# Patient Record
Sex: FEMALE | Race: WHITE | Employment: OTHER | URBAN - NONMETROPOLITAN AREA
[De-identification: names, ages, dates, MRNs, and addresses within clinical notes are randomized per-mention and may not be internally consistent; named-entity substitution may affect disease eponyms.]

---

## 2017-09-22 ENCOUNTER — APPOINTMENT (OUTPATIENT)
Dept: CT IMAGING | Age: 77
DRG: 177 | End: 2017-09-22
Payer: MEDICARE

## 2017-09-22 ENCOUNTER — NURSE TRIAGE (OUTPATIENT)
Dept: ADMINISTRATIVE | Age: 77
End: 2017-09-22

## 2017-09-22 ENCOUNTER — APPOINTMENT (OUTPATIENT)
Dept: GENERAL RADIOLOGY | Age: 77
DRG: 177 | End: 2017-09-22
Payer: MEDICARE

## 2017-09-22 ENCOUNTER — HOSPITAL ENCOUNTER (INPATIENT)
Age: 77
LOS: 12 days | Discharge: NURSING, REHAB OR LONG-TERM CARE | DRG: 177 | End: 2017-10-04
Attending: FAMILY MEDICINE | Admitting: INTERNAL MEDICINE
Payer: MEDICARE

## 2017-09-22 DIAGNOSIS — E86.0 DEHYDRATION: ICD-10-CM

## 2017-09-22 DIAGNOSIS — R33.9 URINARY RETENTION: ICD-10-CM

## 2017-09-22 DIAGNOSIS — R50.9 FEVER, UNSPECIFIED FEVER CAUSE: Primary | ICD-10-CM

## 2017-09-22 DIAGNOSIS — K20.90 ESOPHAGITIS, UNSPECIFIED: ICD-10-CM

## 2017-09-22 PROBLEM — G30.9 ALZHEIMER'S DISEASE (HCC): Status: ACTIVE | Noted: 2017-09-22

## 2017-09-22 PROBLEM — F02.80 ALZHEIMER'S DISEASE (HCC): Status: ACTIVE | Noted: 2017-09-22

## 2017-09-22 PROBLEM — M48.061 SPINAL STENOSIS AT L4-L5 LEVEL: Status: ACTIVE | Noted: 2017-09-22

## 2017-09-22 PROBLEM — R07.9 CHEST PAIN: Status: ACTIVE | Noted: 2017-09-22

## 2017-09-22 PROBLEM — K44.0 HIATUS HERNIA WITH OBSTRUCTION: Status: ACTIVE | Noted: 2017-09-22

## 2017-09-22 PROBLEM — J69.0 ASPIRATION PNEUMONIA (HCC): Status: ACTIVE | Noted: 2017-09-22

## 2017-09-22 PROBLEM — R29.898 WEAKNESS OF LEFT LOWER EXTREMITY: Status: ACTIVE | Noted: 2017-09-22

## 2017-09-22 LAB
ALBUMIN SERPL-MCNC: 3.5 G/DL (ref 3.5–5.1)
ALLEN TEST: POSITIVE
ALP BLD-CCNC: 108 U/L (ref 38–126)
ALT SERPL-CCNC: 19 U/L (ref 11–66)
ANION GAP SERPL CALCULATED.3IONS-SCNC: 16 MEQ/L (ref 8–16)
ANISOCYTOSIS: ABNORMAL
APTT: 23.6 SECONDS (ref 22–38)
AST SERPL-CCNC: 25 U/L (ref 5–40)
BACTERIA: ABNORMAL
BASE EXCESS (CALCULATED): 4 MMOL/L (ref -2.5–2.5)
BASOPHILS # BLD: 0.1 %
BASOPHILS ABSOLUTE: 0 THOU/MM3 (ref 0–0.1)
BILIRUB SERPL-MCNC: 0.6 MG/DL (ref 0.3–1.2)
BILIRUBIN DIRECT: < 0.2 MG/DL (ref 0–0.3)
BILIRUBIN URINE: NEGATIVE
BLOOD, URINE: NEGATIVE
BUN BLDV-MCNC: 35 MG/DL (ref 7–22)
CALCIUM SERPL-MCNC: 9.1 MG/DL (ref 8.5–10.5)
CASTS: ABNORMAL /LPF
CASTS: ABNORMAL /LPF
CHARACTER, URINE: ABNORMAL
CHLORIDE BLD-SCNC: 97 MEQ/L (ref 98–111)
CO2: 24 MEQ/L (ref 23–33)
COLLECTED BY:: ABNORMAL
COLOR: YELLOW
CREAT SERPL-MCNC: 0.9 MG/DL (ref 0.4–1.2)
CRYSTALS: ABNORMAL
DEVICE: ABNORMAL
EKG ATRIAL RATE: 86 BPM
EKG P AXIS: 36 DEGREES
EKG P-R INTERVAL: 126 MS
EKG Q-T INTERVAL: 372 MS
EKG QRS DURATION: 80 MS
EKG QTC CALCULATION (BAZETT): 445 MS
EKG R AXIS: 12 DEGREES
EKG T AXIS: 64 DEGREES
EKG VENTRICULAR RATE: 86 BPM
EOSINOPHIL # BLD: 0 %
EOSINOPHILS ABSOLUTE: 0 THOU/MM3 (ref 0–0.4)
EPITHELIAL CELLS, UA: ABNORMAL /HPF
GFR SERPL CREATININE-BSD FRML MDRD: 61 ML/MIN/1.73M2
GLUCOSE BLD-MCNC: 107 MG/DL (ref 70–108)
GLUCOSE, URINE: NEGATIVE MG/DL
HCO3: 28 MMOL/L (ref 23–28)
HCT VFR BLD CALC: 40.1 % (ref 37–47)
HEMOGLOBIN: 13.7 GM/DL (ref 12–16)
INR BLD: 1.02 (ref 0.85–1.13)
KETONES, URINE: NEGATIVE
LACTIC ACID: 1 MMOL/L (ref 0.5–2.2)
LEUKOCYTE ESTERASE, URINE: NEGATIVE
LIPASE: 55.9 U/L (ref 5.6–51.3)
LYMPHOCYTES # BLD: 9.8 %
LYMPHOCYTES ABSOLUTE: 1.5 THOU/MM3 (ref 1–4.8)
MCH RBC QN AUTO: 28.7 PG (ref 27–31)
MCHC RBC AUTO-ENTMCNC: 34.2 GM/DL (ref 33–37)
MCV RBC AUTO: 83.9 FL (ref 81–99)
MISCELLANEOUS LAB TEST RESULT: ABNORMAL
MONOCYTES # BLD: 6.7 %
MONOCYTES ABSOLUTE: 1 THOU/MM3 (ref 0.4–1.3)
NITRITE, URINE: NEGATIVE
NUCLEATED RED BLOOD CELLS: 0 /100 WBC
O2 SATURATION: 94 %
OSMOLALITY CALCULATION: 282.3 MOSMOL/KG (ref 275–300)
PCO2: 38 MMHG (ref 35–45)
PDW BLD-RTO: 16.9 % (ref 11.5–14.5)
PH BLOOD GAS: 7.47 (ref 7.35–7.45)
PH UA: 5
PLATELET # BLD: 361 THOU/MM3 (ref 130–400)
PMV BLD AUTO: 7.4 MCM (ref 7.4–10.4)
PO2: 66 MMHG (ref 71–104)
POTASSIUM SERPL-SCNC: 3.7 MEQ/L (ref 3.5–5.2)
PRO-BNP: 1048 PG/ML (ref 0–1800)
PROCALCITONIN: 0.07 NG/ML (ref 0.01–0.09)
PROTEIN UA: ABNORMAL MG/DL
RBC # BLD: 4.78 MILL/MM3 (ref 4.2–5.4)
RBC # BLD: NORMAL 10*6/UL
RBC URINE: ABNORMAL /HPF
RENAL EPITHELIAL, UA: ABNORMAL
SEG NEUTROPHILS: 83.4 %
SEGMENTED NEUTROPHILS ABSOLUTE COUNT: 12.5 THOU/MM3 (ref 1.8–7.7)
SODIUM BLD-SCNC: 137 MEQ/L (ref 135–145)
SOURCE, BLOOD GAS: ABNORMAL
SPECIFIC GRAVITY UA: 1.03 (ref 1–1.03)
TOTAL PROTEIN: 6.7 G/DL (ref 6.1–8)
TROPONIN T: < 0.01 NG/ML
UROBILINOGEN, URINE: 0.2 EU/DL
WBC # BLD: 15 THOU/MM3 (ref 4.8–10.8)
WBC UA: ABNORMAL /HPF
YEAST: ABNORMAL

## 2017-09-22 PROCEDURE — 87040 BLOOD CULTURE FOR BACTERIA: CPT

## 2017-09-22 PROCEDURE — 99285 EMERGENCY DEPT VISIT HI MDM: CPT

## 2017-09-22 PROCEDURE — 6360000002 HC RX W HCPCS: Performed by: EMERGENCY MEDICINE

## 2017-09-22 PROCEDURE — 6370000000 HC RX 637 (ALT 250 FOR IP): Performed by: INTERNAL MEDICINE

## 2017-09-22 PROCEDURE — 51798 US URINE CAPACITY MEASURE: CPT

## 2017-09-22 PROCEDURE — 87086 URINE CULTURE/COLONY COUNT: CPT

## 2017-09-22 PROCEDURE — B32T1ZZ COMPUTERIZED TOMOGRAPHY (CT SCAN) OF LEFT PULMONARY ARTERY USING LOW OSMOLAR CONTRAST: ICD-10-PCS | Performed by: RADIOLOGY

## 2017-09-22 PROCEDURE — 6360000004 HC RX CONTRAST MEDICATION: Performed by: EMERGENCY MEDICINE

## 2017-09-22 PROCEDURE — 96361 HYDRATE IV INFUSION ADD-ON: CPT

## 2017-09-22 PROCEDURE — 96374 THER/PROPH/DIAG INJ IV PUSH: CPT

## 2017-09-22 PROCEDURE — 82803 BLOOD GASES ANY COMBINATION: CPT

## 2017-09-22 PROCEDURE — 6360000002 HC RX W HCPCS: Performed by: FAMILY MEDICINE

## 2017-09-22 PROCEDURE — 83880 ASSAY OF NATRIURETIC PEPTIDE: CPT

## 2017-09-22 PROCEDURE — 36415 COLL VENOUS BLD VENIPUNCTURE: CPT

## 2017-09-22 PROCEDURE — 82248 BILIRUBIN DIRECT: CPT

## 2017-09-22 PROCEDURE — 96375 TX/PRO/DX INJ NEW DRUG ADDON: CPT

## 2017-09-22 PROCEDURE — 85730 THROMBOPLASTIN TIME PARTIAL: CPT

## 2017-09-22 PROCEDURE — 83605 ASSAY OF LACTIC ACID: CPT

## 2017-09-22 PROCEDURE — 81001 URINALYSIS AUTO W/SCOPE: CPT

## 2017-09-22 PROCEDURE — 74177 CT ABD & PELVIS W/CONTRAST: CPT

## 2017-09-22 PROCEDURE — 84484 ASSAY OF TROPONIN QUANT: CPT

## 2017-09-22 PROCEDURE — 85610 PROTHROMBIN TIME: CPT

## 2017-09-22 PROCEDURE — 76376 3D RENDER W/INTRP POSTPROCES: CPT

## 2017-09-22 PROCEDURE — 36600 WITHDRAWAL OF ARTERIAL BLOOD: CPT

## 2017-09-22 PROCEDURE — 1200000000 HC SEMI PRIVATE

## 2017-09-22 PROCEDURE — 96376 TX/PRO/DX INJ SAME DRUG ADON: CPT

## 2017-09-22 PROCEDURE — 2580000003 HC RX 258: Performed by: FAMILY MEDICINE

## 2017-09-22 PROCEDURE — B32S1ZZ COMPUTERIZED TOMOGRAPHY (CT SCAN) OF RIGHT PULMONARY ARTERY USING LOW OSMOLAR CONTRAST: ICD-10-PCS | Performed by: RADIOLOGY

## 2017-09-22 PROCEDURE — 83690 ASSAY OF LIPASE: CPT

## 2017-09-22 PROCEDURE — 84145 PROCALCITONIN (PCT): CPT

## 2017-09-22 PROCEDURE — 82550 ASSAY OF CK (CPK): CPT

## 2017-09-22 PROCEDURE — 51701 INSERT BLADDER CATHETER: CPT

## 2017-09-22 PROCEDURE — 93005 ELECTROCARDIOGRAM TRACING: CPT | Performed by: FAMILY MEDICINE

## 2017-09-22 PROCEDURE — 80053 COMPREHEN METABOLIC PANEL: CPT

## 2017-09-22 PROCEDURE — 85025 COMPLETE CBC W/AUTO DIFF WBC: CPT

## 2017-09-22 PROCEDURE — 71275 CT ANGIOGRAPHY CHEST: CPT

## 2017-09-22 PROCEDURE — 71010 XR CHEST PORTABLE: CPT

## 2017-09-22 PROCEDURE — 99223 1ST HOSP IP/OBS HIGH 75: CPT | Performed by: INTERNAL MEDICINE

## 2017-09-22 RX ORDER — ARIPIPRAZOLE 2 MG/1
2 TABLET ORAL DAILY
Status: DISCONTINUED | OUTPATIENT
Start: 2017-09-23 | End: 2017-09-23

## 2017-09-22 RX ORDER — ASPIRIN 81 MG/1
81 TABLET, CHEWABLE ORAL NIGHTLY
Status: DISCONTINUED | OUTPATIENT
Start: 2017-09-22 | End: 2017-10-01

## 2017-09-22 RX ORDER — ACETAMINOPHEN 650 MG/1
650 SUPPOSITORY RECTAL EVERY 8 HOURS PRN
Status: DISCONTINUED | OUTPATIENT
Start: 2017-09-22 | End: 2017-10-04 | Stop reason: HOSPADM

## 2017-09-22 RX ORDER — LOPERAMIDE HYDROCHLORIDE 2 MG/1
2 CAPSULE ORAL NIGHTLY
Status: DISCONTINUED | OUTPATIENT
Start: 2017-09-22 | End: 2017-10-02

## 2017-09-22 RX ORDER — DICYCLOMINE HCL 20 MG
20 TABLET ORAL 2 TIMES DAILY
Status: DISCONTINUED | OUTPATIENT
Start: 2017-09-22 | End: 2017-09-23

## 2017-09-22 RX ORDER — SODIUM CHLORIDE 9 MG/ML
INJECTION, SOLUTION INTRAVENOUS CONTINUOUS
Status: DISCONTINUED | OUTPATIENT
Start: 2017-09-22 | End: 2017-09-22 | Stop reason: HOSPADM

## 2017-09-22 RX ORDER — ONDANSETRON 2 MG/ML
4 INJECTION INTRAMUSCULAR; INTRAVENOUS ONCE
Status: COMPLETED | OUTPATIENT
Start: 2017-09-22 | End: 2017-09-22

## 2017-09-22 RX ORDER — SIMVASTATIN 40 MG
40 TABLET ORAL NIGHTLY
Status: DISCONTINUED | OUTPATIENT
Start: 2017-09-22 | End: 2017-09-23

## 2017-09-22 RX ORDER — DEXTROSE AND SODIUM CHLORIDE 5; .45 G/100ML; G/100ML
INJECTION, SOLUTION INTRAVENOUS CONTINUOUS
Status: DISCONTINUED | OUTPATIENT
Start: 2017-09-22 | End: 2017-09-25

## 2017-09-22 RX ORDER — PANTOPRAZOLE SODIUM 40 MG/1
40 TABLET, DELAYED RELEASE ORAL
Status: DISCONTINUED | OUTPATIENT
Start: 2017-09-23 | End: 2017-09-30

## 2017-09-22 RX ORDER — DRONABINOL 2.5 MG/1
2.5 CAPSULE ORAL 2 TIMES DAILY
Status: DISCONTINUED | OUTPATIENT
Start: 2017-09-22 | End: 2017-09-29

## 2017-09-22 RX ORDER — 0.9 % SODIUM CHLORIDE 0.9 %
500 INTRAVENOUS SOLUTION INTRAVENOUS ONCE
Status: COMPLETED | OUTPATIENT
Start: 2017-09-22 | End: 2017-09-22

## 2017-09-22 RX ORDER — VENLAFAXINE HYDROCHLORIDE 75 MG/1
75 CAPSULE, EXTENDED RELEASE ORAL 2 TIMES DAILY
Status: DISCONTINUED | OUTPATIENT
Start: 2017-09-22 | End: 2017-10-03

## 2017-09-22 RX ORDER — DONEPEZIL HYDROCHLORIDE 10 MG/1
10 TABLET, FILM COATED ORAL DAILY
Status: DISCONTINUED | OUTPATIENT
Start: 2017-09-23 | End: 2017-09-23

## 2017-09-22 RX ORDER — OMEPRAZOLE 20 MG/1
20 CAPSULE, DELAYED RELEASE ORAL DAILY
Status: DISCONTINUED | OUTPATIENT
Start: 2017-09-23 | End: 2017-09-22 | Stop reason: CLARIF

## 2017-09-22 RX ORDER — LEVOFLOXACIN 5 MG/ML
750 INJECTION, SOLUTION INTRAVENOUS ONCE
Status: COMPLETED | OUTPATIENT
Start: 2017-09-22 | End: 2017-09-22

## 2017-09-22 RX ADMIN — ONDANSETRON 4 MG: 2 INJECTION INTRAMUSCULAR; INTRAVENOUS at 12:19

## 2017-09-22 RX ADMIN — ACETAMINOPHEN 650 MG: 650 SUPPOSITORY RECTAL at 21:07

## 2017-09-22 RX ADMIN — LEVOFLOXACIN 750 MG: 5 INJECTION, SOLUTION INTRAVENOUS at 20:39

## 2017-09-22 RX ADMIN — IOPAMIDOL 80 ML: 755 INJECTION, SOLUTION INTRAVENOUS at 16:03

## 2017-09-22 RX ADMIN — SODIUM CHLORIDE 500 ML: 9 INJECTION, SOLUTION INTRAVENOUS at 15:10

## 2017-09-22 RX ADMIN — ONDANSETRON 4 MG: 2 INJECTION INTRAMUSCULAR; INTRAVENOUS at 18:35

## 2017-09-22 RX ADMIN — SODIUM CHLORIDE: 9 INJECTION, SOLUTION INTRAVENOUS at 16:22

## 2017-09-22 ASSESSMENT — ENCOUNTER SYMPTOMS
BACK PAIN: 0
DIARRHEA: 0
ABDOMINAL PAIN: 0
SHORTNESS OF BREATH: 1

## 2017-09-22 ASSESSMENT — PAIN SCALES - GENERAL: PAINLEVEL_OUTOF10: 0

## 2017-09-22 NOTE — ED PROVIDER NOTES
[facility]  eMERGENCY dEPARTMENT eNCOUnter          [address] [facility]       Chief Complaint   Patient presents with    Shortness of Breath     last night    Other     gagging on her medications and spiting them out       Nurses Notes reviewed and I agree except as noted in the HPI. HISTORY OF PRESENT ILLNESS    Nate Garcia is a 65 y.o. female with a past medical history of Dementia, GERD, Hyperlipidemia, and Hypertension who presents to the ED vis EMS for evaluation of shortness of breath. Per family, the patient has been complaining of feeling short of breath at night, particularly when laying flat. Family has noticed that when the patient is laying down to sleep she seems to be working harder to breath. These symptoms improve when the patient is placed into an upright seated position. She does not have any known lung diseases or history of CHF. The patient denies feeling short of breath at this time, but does report having occasional chest pain. Family also states that the patient had nausea and emesis two days ago, which has since resolved however the patient had been gagging more often when taking her medications. The patient does not use any chronic home O2 support. She is not a smoker. No additional complaints or concerns at the time of initial evaluation. Location/Symptom: shortness of breath   Timing/Onset: last night   Context/Setting: when laying flat; improved with sitting up   Quality: cant take full breath   Duration: occasional- only at night   Modifying Factors: worse when laying flat  Severity: mild      The HPI was provided by the patient. REVIEW OF SYSTEMS     Review of Systems   Constitutional: Positive for fever. HENT: Positive for congestion. Eyes: Negative for visual disturbance. Respiratory: Positive for shortness of breath. Cardiovascular: Negative for chest pain, palpitations and leg swelling.    Gastrointestinal: Negative for abdominal pain and diarrhea.        2 days ago had nausea and vomiting which resolved   Genitourinary: Negative for dysuria. Musculoskeletal: Negative for back pain and joint swelling. Skin: Negative for rash. Neurological: Negative for headaches. Hematological: Does not bruise/bleed easily. Psychiatric/Behavioral:        Chronic dementia       PAST MEDICAL HISTORY    has a past medical history of Dementia; Depression; GERD (gastroesophageal reflux disease); Hyperlipidemia; and Hypertension. SURGICAL HISTORY      has a past surgical history that includes Colonoscopy; Cholecystectomy; and Upper gastrointestinal endoscopy. CURRENT MEDICATIONS       Previous Medications    ARIPIPRAZOLE (ABILIFY) 2 MG TABLET    Take 2 mg by mouth daily    ASPIRIN 81 MG TABLET    Take 81 mg by mouth nightly. DICYCLOMINE (BENTYL) 20 MG TABLET    Take 20 mg by mouth 2 times daily    DONEPEZIL (ARICEPT) 10 MG TABLET    Take 10 mg by mouth daily     LISINOPRIL PO    Take 60 mg by mouth daily    LOPERAMIDE (IMODIUM) 2 MG CAPSULE    Take 2 mg by mouth nightly. OMEPRAZOLE (PRILOSEC) 10 MG CAPSULE    Take 20 mg by mouth daily     SIMVASTATIN (ZOCOR) 40 MG TABLET    Take 40 mg by mouth nightly. VENLAFAXINE (EFFEXOR-XR) 75 MG XR CAPSULE    Take 75 mg by mouth 2 times daily. ALLERGIES     has No Known Allergies. FAMILY HISTORY     indicated that the status of her mother is unknown. She indicated that the status of her father is unknown. She indicated that the status of her sister is unknown. She indicated that the status of her brother is unknown.  family history includes Hearing Loss in her brother, father, mother, and sister; High Blood Pressure in her mother; Kidney Disease in her sister; Stroke in her brother and father; Vision Loss in her mother. SOCIAL HISTORY      reports that she has never smoked. She has never used smokeless tobacco. She reports that she does not drink alcohol or use illicit drugs.     PHYSICAL EXAM INITIAL VITALS:  oral temperature is 99.1 °F (37.3 °C). Her blood pressure is 152/85 (abnormal) and her pulse is 81. Her respiration is 18 and oxygen saturation is 92%. Physical Exam   Constitutional: She appears well-developed and well-nourished. GCS 15   HENT:   Head: Normocephalic and atraumatic. Eyes: Conjunctivae are normal. Pupils are equal, round, and reactive to light. Neck: Normal range of motion. Neck supple. No JVD present. Cardiovascular: Normal rate and regular rhythm. Pulmonary/Chest: Effort normal and breath sounds normal. She has no wheezes. O2 sats to drop to around 90% on room air   Abdominal: Soft. Bowel sounds are normal. There is no tenderness. There is no rebound and no guarding. Musculoskeletal: Normal range of motion. She exhibits no edema. Neurological: She is alert. She exhibits normal muscle tone. Skin: Skin is warm and dry. No rash noted. Psychiatric:   Poor memory but alert cooperative   Nursing note and vitals reviewed. DIFFERENTIAL DIAGNOSIS:   Shortness of breath, possible pneumonia    With hypoxemia also consider PE    She has dementia low-grade fever consider UTI        DIAGNOSTIC RESULTS     EKG: All EKG's are interpreted by the Emergency Department Physician who either signs or Co-signs this chart in the absence of a cardiologist.  EKG interpreted by Yasmani Saldivar MD:    EKG showed Sinus rhythm with rate 86. QRS complexes show  normal axis, Normal conduction. ST-T waves show Nonspecific changes        RADIOLOGY: non-plain film images(s) such as CT, Ultrasound and MRI are read by the radiologist.    XR Chest Portable   Final Result   Large fixed hiatal hernia. No definite acute findings. **This report has been created using voice recognition software. It may contain minor errors which are inherent in voice recognition technology. **      Final report electronically signed by Dr. Kassi Renee on 9/22/2017 12:53 PM      CTA CHEST W WO CONTRAST    (Results Pending)   CT ABDOMEN PELVIS W IV CONTRAST Additional Contrast? None    (Results Pending)        LABS:   Labs Reviewed   CBC WITH AUTO DIFFERENTIAL - Abnormal; Notable for the following:        Result Value    WBC 15.0 (*)     RDW 16.9 (*)     Segs Absolute 12.5 (*)     All other components within normal limits   BASIC METABOLIC PANEL - Abnormal; Notable for the following:     Chloride 97 (*)     BUN 35 (*)     All other components within normal limits   LIPASE - Abnormal; Notable for the following:     Lipase 55.9 (*)     All other components within normal limits   BLOOD GAS, ARTERIAL - Abnormal; Notable for the following:     pH, Blood Gas 7.47 (*)     PO2 66 (*)     Base Excess (Calculated) 4.0 (*)     All other components within normal limits   GLOMERULAR FILTRATION RATE, ESTIMATED - Abnormal; Notable for the following:     Est, Glom Filt Rate 61 (*)     All other components within normal limits   URINALYSIS WITH MICROSCOPIC - Abnormal; Notable for the following:     Protein, UA TRACE (*)     All other components within normal limits   URINE CULTURE   CULTURE BLOOD #1   CULTURE BLOOD #2   PROTIME-INR   BRAIN NATRIURETIC PEPTIDE   APTT   HEPATIC FUNCTION PANEL   TROPONIN   ANION GAP   OSMOLALITY   LACTIC ACID, PLASMA   PROCALCITONIN       EMERGENCY DEPARTMENT COURSE:   Vitals:    Vitals:    09/22/17 1143 09/22/17 1406 09/22/17 1511   BP: (!) 160/120 133/76 (!) 152/85   Pulse: 82 86 81   Resp: 20 18 18   Temp: 99.1 °F (37.3 °C)     TempSrc: Oral     SpO2: 94% 97% 92%       11:52 AM: The patient was seen and evaluated. Labs and imaging will be completed.       Nursing notes reviewed    She does have low-grade fever without definite chest x-ray explanation    Urinalysis obtained showed minimal white cells    Her O2 sats dropped to 90% on room air    Consider PE as a cause of her symptoms    We'll do a CT scan of the chest for PE/pneumonia    Will do a CT of the abdomen to evaluate for cause of

## 2017-09-22 NOTE — IP AVS SNAPSHOT
After Visit Summary  (Discharge Instructions)    Medication List for Home    Based on the information you provided to us as well as any changes during this visit, the following is your updated medication list.  Compare this with your prescription bottles at home. If you have any questions or concerns, contact your primary care physician's office. Daily Medication List (This medication list can be shared with any healthcare provider who is helping you manage your medications)      There are NEW medications for you.  START taking them after you leave the hospital        Last Dose    Next Dose Due AM NOON PM NIGHT    ferrous gluconate 240 (27 Fe) MG tablet   Commonly known as:  FERGON   Take 1.5 tablets by mouth 3 times daily (with meals)                120 mg on 10/4/2017  8:46 AM     10/04/17 PM                                risperiDONE 0.25 MG tablet   Commonly known as:  RISPERDAL   Take 1 tablet by mouth 2 times daily                0.25 mg on 10/3/2017  9:29 PM     10/04/17 PM                             sucralfate 1 GM/10ML suspension   Commonly known as:  CARAFATE   Take 10 mLs by mouth 4 times daily (before meals and nightly)                1 g on 10/4/2017 12:31 AM     10/04/17 PM                                       You told us you were taking these medications at home, but the amount or how often you take this medication has CHANGED        Last Dose    Next Dose Due AM NOON PM NIGHT    atenolol 50 MG tablet   Commonly known as:  TENORMIN   Take 1 tablet by mouth daily Hold for systolic blood pressure less than 110, heart rate less than 60   What changed:    - medication strength  - how much to take  - additional instructions                50 mg on 10/3/2017  9:05 AM     10/05/17 AM                          pantoprazole 40 MG tablet   Commonly known as:  PROTONIX   Take 1 tablet by mouth 2 times daily (before meals)   What changed:    - medication strength  - how much to take - when to take this                40 mg on 10/3/2017  4:48 PM     10/04/17 PM                             venlafaxine 75 MG extended release capsule   Commonly known as:  EFFEXOR XR   Take 1 capsule by mouth daily (with breakfast)   What changed:  when to take this                75 mg on 10/3/2017  8:59 AM     10/05/17 AM                            These are medications you told us you were taking at home, CONTINUE taking them after you leave the hospital        Last Dose    Next Dose Due AM NOON PM NIGHT    ALPRAZolam 0.5 MG tablet   Commonly known as:  XANAX   Take 1 tablet by mouth every 8 hours as needed for Anxiety                0.5 mg on 10/3/2017  8:59 AM                            atorvastatin 40 MG tablet   Commonly known as:  LIPITOR   Take 40 mg by mouth daily                  10/04/17 PM                          donepezil 10 MG tablet   Commonly known as:  ARICEPT   Take 20 mg by mouth daily                20 mg on 10/3/2017  9:02 AM     10/05/17 AM                          loperamide 2 MG capsule   Commonly known as:  IMODIUM   Take 2 mg by mouth nightly.                 2 mg on 9/30/2017  8:24 PM     10/04/17 PM                            These are the medications you have told us you were taking at home, STOP taking them after you leave the hospital     ARIPiprazole 2 MG tablet   Commonly known as:  ABILIFY       aspirin 81 MG tablet       dicyclomine 20 MG tablet   Commonly known as:  BENTYL       glycopyrrolate 1 MG tablet   Commonly known as:  ROBINUL       hydrALAZINE 50 MG tablet   Commonly known as:  APRESOLINE       LISINOPRIL PO       omeprazole 10 MG delayed release capsule   Commonly known as:  PRILOSEC       simvastatin 40 MG tablet   Commonly known as:  ZOCOR            Where to Get Your Medications      You can get these medications from any pharmacy     Bring a paper prescription for each of these medications     ALPRAZolam 0.5 MG tablet Information about where to get these medications is not yet available     ! Ask your nurse or doctor about these medications     atenolol 50 MG tablet    ferrous gluconate 240 (27 Fe) MG tablet    pantoprazole 40 MG tablet    risperiDONE 0.25 MG tablet    sucralfate 1 GM/10ML suspension    venlafaxine 75 MG extended release capsule               Allergies as of 10/4/2017     No Known Allergies      Immunizations as of 10/4/2017     No immunizations on file. Last Vitals          Most Recent Value    Temp  97.7 °F (36.5 °C)    Pulse  67    Resp  18    BP  125/62         After Visit Summary    This summary was created for you. Thank you for entrusting your care to us. The following information includes details about your hospital/visit stay along with steps you should take to help with your recovery once you leave the hospital.  In this packet, you will find information about the topics listed below:    · Instructions about your medications including a list of your home medications  · A summary of your hospital visit  · Follow-up appointments once you have left the hospital  · Your care plan at home      You may receive a survey regarding the care you received during your stay. Your input is valuable to us. We encourage you to complete and return your survey in the envelope provided. We hope you will choose us in the future for your healthcare needs. Patient Information     Patient Name MARILIA Boggs 1940      Care Provided at:     Name Address Phone       [address] [phone]            Your Visit    Here you will find information about your visit, including the reason for your visit. Please take this sheet with you when you visit your doctor or other health care provider in the future. It will help determine the best possible medical care for you at that time.   If you have any questions once you leave the hospital, please call the department phone number listed below. Why you were here     Your primary diagnosis was:  Aspiration Pneumonia (Hcc)    Your diagnoses also included:  Hiatus Hernia With Obstruction, Chest Pain, Spinal Stenosis At L4-L5 Level, Weakness Of Left Lower Extremity, Urinary Retention, Alzheimer's Dementia With Behavioral Disturbance, Fever, Dehydration, Esophagitis, Unspecified, Dysmotility Of Stomach      Visit Information     Date & Time Provider Department Dept. Phone    9/22/2017 Ernesto Starks MD [facility] [phone]       Follow-up Appointments    Below is a list of your follow-up and future appointments. This may not be a complete list as you may have made appointments directly with providers that we are not aware of or your providers may have made some for you. Please call your providers to confirm appointments. It is important to keep your appointments. Please bring your current insurance card, photo ID, co-pay, and all medication bottles to your appointment. If self-pay, payment is expected at the time of service. Follow-up Information     Follow up with Palak Kerns DO. Specialty:  Family Medicine    Contact information:    Pam Joseph [address] [place] [address]  [phone]          Follow up with [facility] / Kellie Pope. Specialty:  [address] information:    [address] [place] [address]  [phone]          Follow up with Eduin Monroe NP On 10/10/2017. Specialty:  Nurse Practitioner    Why:  930AM    Contact information:    [address]          Follow up with Justin Galvez MD In 3 weeks.     Specialty:  Neurology    Why:  Will call you to make appointment    Contact information:    [facility]          Follow up with Carlyle Hutchins MD.    Specialty:  Psychiatry Why:  Office will call with appointment    Contact information:    Albert Landis  [address] [place] [address] Road [address]        Future Appointments     10/10/2017 9:30 AM     Appointment with Wilmer Lowery NP at [facility] ([phone])   Please arrive 15 minutes prior to scheduled appointment time to complete paper work, bring photo ID and insurance cards. [address] [address]         Preventive Care        Date Due    Tetanus Combination Vaccine (1 - Tdap) 1958    Cholesterol Screening 1979    Zoster Vaccine 1999    Osteoporosis screening or a bone density scan (Dexa) is recommended once at age 72. Based upon the results and risk factors for bone loss, your provider will recommend whether this needs to be repeated. 2004    Pneumococcal Vaccines (two) for all adults aged 72 and over (1 of 2 - PCV13) 2004    Yearly Flu Vaccine (1) 2017                 Care Plan Once You Return Home    This section includes instructions you will need to follow once you leave the hospital.  Your care team will discuss these with you, so you and those caring for you know how to best care for your health needs at home. This section may also include educational information about certain health topics that may be of help to you. Discharge [address] Continuity of Care Form    Patient Name: Jerry Borja   :  1940  MRN:  [id number]    Admit date:  2017  Discharge date:  10-4-2017    Code Status Order: DNR-CCA   Advance Directives:  Yes    Admitting Physician:  Tremaine Munoz MD  PCP: Delma Lr DO    Discharging Nurse: [address] [facility] Unit/Room#: 8B-23/023-A  Discharging Unit Phone Number: [phone]    Emergency Contact:   Contact 1: Name: Josh Franco  Contact 1: Number: [phone] home [phone] cell  Contact 1: Relationship:     Past Surgical History:  Past Surgical History: Procedure Laterality Date    CHOLECYSTECTOMY      COLONOSCOPY      UPPER GASTROINTESTINAL ENDOSCOPY         Immunization History: There is no immunization history for the selected administration types on file for this patient. Active Problems:  Patient Active Problem List   Diagnosis    Dementia    Hiatus hernia with compression intrathoracic    Chest pain    Spinal stenosis at L4-L5 level  with left leg weakness and urinary retention    Weakness of left lower extremity  spinal stenosis  or cva    Urinary retention  neurogenic bladder    Alzheimer's dementia with behavioral disturbance    Aspiration pneumonia (HCC)  bilateral  from large hiatus hernia    Fever    Dehydration       Isolation/Infection:   Isolation     No Isolation            Nurse Assessment:  Last Vital Signs: BP (!) 170/76  Pulse 78  Temp 98.4 °F (36.9 °C) (Axillary)   Resp 16  Ht 5' 1\" (1.549 m)  Wt 199 lb 2 oz (90.3 kg)  SpO2 95%  BMI 37.62 kg/m2    Last documented pain score (0-10 scale): Pain Level: 0  Last Weight:   Wt Readings from Last 1 Encounters:   09/28/17 199 lb 2 oz (90.3 kg)     Mental Status:  disoriented     IV Access:  - None    Nursing Mobility/ADLs:  Walking   Dependent  Transfer  Dependent  Bathing  Dependent  Dressing  Dependent  Toileting  Dependent  Feeding  Assisted  Med Admin  Dependent  Med Delivery   whole    Wound Care Documentation and Therapy:        Elimination:  Continence:   · Bowel: Yes  · Bladder: [place]  Urinary Catheter: Insertion Date: 10-4-17  Colostomy/Ileostomy/Ileal Conduit: No    Date of Last BM: 10-4-17    Intake/Output Summary (Last 24 hours) at 09/28/17 1102  Last data filed at 09/28/17 0449   Gross per 24 hour   Intake              690 ml   Output              365 ml   Net              325 ml     I/O last 3 completed shifts:   In: 80 [P.O.:810]  Out: 365 [Urine:325; Emesis/NG output:40]    Safety Concerns:     History of Falls (last 30 days), At Risk for Falls and dementia

## 2017-09-22 NOTE — ED PROVIDER NOTES
Patient was a signout from Dr. Alyssa Seymour; complaint from family was that patient has had poor oral intake, she had a transient episode of shortness of breath. Patient has history of dementia and was unable to add anything to HPI other than what was related to me by family. In room, patient appears comfortable, slightly febrile, elevated white count on lab work. CAT scan of the chest is nonacute, CT abdomen significant for moderate to severe hiatal hernia. We will admit patient to the hospital for further evaluation.        Conor Arita, DO  09/24/17 [address] [facility], DO  09/24/17 8205

## 2017-09-22 NOTE — PROCEDURES
A Bladder scan was performed at 1931 . The patient's last void was at [address] on 09/21/17 . The residual amount was measured to be 547 ML. Report of results was given to Dr. Angel Sue.

## 2017-09-22 NOTE — ED NOTES
Lab at bedside. One unsuccessful peripheral line start by Adelia Jaramillo, Student.       [place] Jeannine, MARIANELA  09/22/17 6572

## 2017-09-23 LAB
ALBUMIN SERPL-MCNC: 3 G/DL (ref 3.5–5.1)
ALP BLD-CCNC: 78 U/L (ref 38–126)
ALT SERPL-CCNC: 14 U/L (ref 11–66)
ANION GAP SERPL CALCULATED.3IONS-SCNC: 13 MEQ/L (ref 8–16)
AST SERPL-CCNC: 17 U/L (ref 5–40)
BILIRUB SERPL-MCNC: 0.3 MG/DL (ref 0.3–1.2)
BUN BLDV-MCNC: 42 MG/DL (ref 7–22)
CALCIUM SERPL-MCNC: 8.1 MG/DL (ref 8.5–10.5)
CHLORIDE BLD-SCNC: 100 MEQ/L (ref 98–111)
CO2: 21 MEQ/L (ref 23–33)
CREAT SERPL-MCNC: 0.9 MG/DL (ref 0.4–1.2)
FLU A ANTIGEN: NEGATIVE
FLU B ANTIGEN: NEGATIVE
GFR SERPL CREATININE-BSD FRML MDRD: 61 ML/MIN/1.73M2
GLUCOSE BLD-MCNC: 111 MG/DL (ref 70–108)
OSMOLALITY CALCULATION: 279.4 MOSMOL/KG (ref 275–300)
POTASSIUM SERPL-SCNC: 3.6 MEQ/L (ref 3.5–5.2)
PROCALCITONIN: 0.06 NG/ML (ref 0.01–0.09)
SODIUM BLD-SCNC: 134 MEQ/L (ref 135–145)
TOTAL CK: 82 U/L (ref 30–135)
TOTAL PROTEIN: 5.6 G/DL (ref 6.1–8)
TROPONIN T: < 0.01 NG/ML
TROPONIN T: < 0.01 NG/ML

## 2017-09-23 PROCEDURE — 84145 PROCALCITONIN (PCT): CPT

## 2017-09-23 PROCEDURE — 84484 ASSAY OF TROPONIN QUANT: CPT

## 2017-09-23 PROCEDURE — 92610 EVALUATE SWALLOWING FUNCTION: CPT

## 2017-09-23 PROCEDURE — 80053 COMPREHEN METABOLIC PANEL: CPT

## 2017-09-23 PROCEDURE — 99233 SBSQ HOSP IP/OBS HIGH 50: CPT | Performed by: PHYSICIAN ASSISTANT

## 2017-09-23 PROCEDURE — 36415 COLL VENOUS BLD VENIPUNCTURE: CPT

## 2017-09-23 PROCEDURE — 90791 PSYCH DIAGNOSTIC EVALUATION: CPT | Performed by: PSYCHIATRY & NEUROLOGY

## 2017-09-23 PROCEDURE — 2580000003 HC RX 258: Performed by: INTERNAL MEDICINE

## 2017-09-23 PROCEDURE — 87804 INFLUENZA ASSAY W/OPTIC: CPT

## 2017-09-23 PROCEDURE — 1200000003 HC TELEMETRY R&B

## 2017-09-23 PROCEDURE — 6360000002 HC RX W HCPCS: Performed by: INTERNAL MEDICINE

## 2017-09-23 PROCEDURE — 6370000000 HC RX 637 (ALT 250 FOR IP): Performed by: PHYSICIAN ASSISTANT

## 2017-09-23 PROCEDURE — 6370000000 HC RX 637 (ALT 250 FOR IP): Performed by: INTERNAL MEDICINE

## 2017-09-23 RX ORDER — ALPRAZOLAM 0.5 MG/1
0.5 TABLET ORAL EVERY 8 HOURS PRN
Status: ON HOLD | COMMUNITY
End: 2017-10-04

## 2017-09-23 RX ORDER — ALPRAZOLAM 0.5 MG/1
0.5 TABLET ORAL EVERY 8 HOURS PRN
Status: DISCONTINUED | OUTPATIENT
Start: 2017-09-23 | End: 2017-10-04 | Stop reason: HOSPADM

## 2017-09-23 RX ORDER — IPRATROPIUM BROMIDE AND ALBUTEROL SULFATE 2.5; .5 MG/3ML; MG/3ML
1 SOLUTION RESPIRATORY (INHALATION) EVERY 4 HOURS PRN
Status: DISCONTINUED | OUTPATIENT
Start: 2017-09-23 | End: 2017-10-04 | Stop reason: HOSPADM

## 2017-09-23 RX ORDER — ATORVASTATIN CALCIUM 40 MG/1
40 TABLET, FILM COATED ORAL DAILY
Status: ON HOLD | COMMUNITY
End: 2019-05-24 | Stop reason: HOSPADM

## 2017-09-23 RX ORDER — DONEPEZIL HYDROCHLORIDE 10 MG/1
20 TABLET, FILM COATED ORAL DAILY
Status: DISCONTINUED | OUTPATIENT
Start: 2017-09-24 | End: 2017-10-03

## 2017-09-23 RX ORDER — ATENOLOL 100 MG/1
100 TABLET ORAL DAILY
Status: ON HOLD | COMMUNITY
End: 2017-10-04 | Stop reason: HOSPADM

## 2017-09-23 RX ORDER — GLYCOPYRROLATE 1 MG/1
1 TABLET ORAL 2 TIMES DAILY
Status: ON HOLD | COMMUNITY
End: 2017-10-04 | Stop reason: HOSPADM

## 2017-09-23 RX ORDER — ARIPIPRAZOLE 2 MG/1
2 TABLET ORAL 2 TIMES DAILY
Status: DISCONTINUED | OUTPATIENT
Start: 2017-09-23 | End: 2017-10-03

## 2017-09-23 RX ORDER — HYDRALAZINE HYDROCHLORIDE 50 MG/1
50 TABLET, FILM COATED ORAL 2 TIMES DAILY
Status: ON HOLD | COMMUNITY
End: 2017-10-04 | Stop reason: HOSPADM

## 2017-09-23 RX ORDER — HYDRALAZINE HYDROCHLORIDE 50 MG/1
50 TABLET, FILM COATED ORAL 2 TIMES DAILY
Status: DISCONTINUED | OUTPATIENT
Start: 2017-09-24 | End: 2017-09-29

## 2017-09-23 RX ADMIN — ARIPIPRAZOLE 2 MG: 2 TABLET ORAL at 13:02

## 2017-09-23 RX ADMIN — ALPRAZOLAM 0.5 MG: 0.5 TABLET ORAL at 12:51

## 2017-09-23 RX ADMIN — PANTOPRAZOLE SODIUM 40 MG: 40 TABLET, DELAYED RELEASE ORAL at 13:12

## 2017-09-23 RX ADMIN — VENLAFAXINE HYDROCHLORIDE 75 MG: 75 CAPSULE, EXTENDED RELEASE ORAL at 13:01

## 2017-09-23 RX ADMIN — DONEPEZIL HYDROCHLORIDE 10 MG: 10 TABLET, FILM COATED ORAL at 13:02

## 2017-09-23 RX ADMIN — AZITHROMYCIN MONOHYDRATE 500 MG: 500 INJECTION, POWDER, LYOPHILIZED, FOR SOLUTION INTRAVENOUS at 01:24

## 2017-09-23 RX ADMIN — PIPERACILLIN SODIUM,TAZOBACTAM SODIUM 3.38 G: 3; .375 INJECTION, POWDER, FOR SOLUTION INTRAVENOUS at 22:08

## 2017-09-23 RX ADMIN — ASPIRIN 81 MG 81 MG: 81 TABLET ORAL at 22:08

## 2017-09-23 RX ADMIN — DRONABINOL 2.5 MG: 2.5 CAPSULE ORAL at 22:08

## 2017-09-23 RX ADMIN — VENLAFAXINE HYDROCHLORIDE 75 MG: 75 CAPSULE, EXTENDED RELEASE ORAL at 22:08

## 2017-09-23 RX ADMIN — PIPERACILLIN SODIUM,TAZOBACTAM SODIUM 3.38 G: 3; .375 INJECTION, POWDER, FOR SOLUTION INTRAVENOUS at 11:53

## 2017-09-23 RX ADMIN — LISINOPRIL 60 MG: 40 TABLET ORAL at 13:02

## 2017-09-23 RX ADMIN — DEXTROSE AND SODIUM CHLORIDE: 5; 450 INJECTION, SOLUTION INTRAVENOUS at 13:54

## 2017-09-23 RX ADMIN — ARIPIPRAZOLE 2 MG: 2 TABLET ORAL at 22:08

## 2017-09-23 RX ADMIN — DEXTROSE AND SODIUM CHLORIDE: 5; 450 INJECTION, SOLUTION INTRAVENOUS at 01:24

## 2017-09-23 RX ADMIN — DICYCLOMINE HYDROCHLORIDE 20 MG: 20 TABLET ORAL at 13:02

## 2017-09-23 RX ADMIN — PIPERACILLIN SODIUM,TAZOBACTAM SODIUM 3.38 G: 3; .375 INJECTION, POWDER, FOR SOLUTION INTRAVENOUS at 03:45

## 2017-09-23 RX ADMIN — LOPERAMIDE HYDROCHLORIDE 2 MG: 2 CAPSULE ORAL at 22:08

## 2017-09-23 NOTE — PROGRESS NOTES
Hospitalist Progress Note    Patient:  Stormy Chandler      Unit/Bed:8B-23/023-A    YOB: 1940    MRN: [id number]       Acct: [de-identified]     PCP: Treasure Ch DO    Date of Admission: 9/22/2017    Chief Complaint: weakness in lower extremities, urinary retention, poor appetite, SOB. Hospital Course: This patient was admitted to [address] [place] [address] on 9/22/17 for chief complaints as noted above. CTA of the chest completed in ED showing mild groundglass infiltrate in both lung fields, pna right posterior costophrenic sulcus. She was started on IV antibiotics for possible aspiration pneumonia. Patient has a history of dementia and family reported increasing agitated behavior at home. Subjective: Patient is resting in bed her  is at the bedside. Patient is sleepy and wakes easily. With questioning she denies pain. She states \"I just want to sleep, get out of here\". She states her  is \"her father\". When examining the patient became very agitated and combative.  states the patient has been sleepy at home, since Tuesday has had increased belching and difficulty keeping food down. He reports she has had poor oral intake over the last 4 days. She has not been able to take medications for the past two days. Patient's  states she sees psychiatrist, Dr. Germania Barroso (spelling unsure), who has been on medical leave. Family physician is in process of referring to a different psychiatrist. The  states he has viewed several different nursing homes in the area but has not found one he likes.       Medications:  Reviewed    Infusion Medications    dextrose 5 % and 0.45 % NaCl 100 mL/hr at 09/23/17 0124     Scheduled Medications    [START ON 9/24/2017] influenza virus vaccine  0.5 mL Intramuscular Once    venlafaxine  75 mg Oral BID    simvastatin  40 mg Oral Nightly    donepezil  10 mg Oral Daily    loperamide  2 mg Oral Nightly    aspirin  81 mg Oral Nightly    lisinopril  60 mg Oral Daily    dicyclomine  20 mg Oral BID    ARIPiprazole  2 mg Oral Daily    enoxaparin  40 mg Subcutaneous Daily    piperacillin-tazobactam  3.375 g Intravenous Q8H    azithromycin  500 mg Intravenous Q24H    dronabinol  2.5 mg Oral BID    pantoprazole  40 mg Oral QAM AC     PRN Meds: acetaminophen      Intake/Output Summary (Last 24 hours) at 09/23/17 0942  Last data filed at 09/23/17 0448   Gross per 24 hour   Intake           383.03 ml   Output              900 ml   Net          -516.97 ml       Diet:       Exam:  BP (!) 125/54  Pulse 64  Temp 97.9 °F (36.6 °C) (Axillary)   Resp 16  Ht 5' 1\" (1.549 m)  Wt 198 lb 3.2 oz (89.9 kg)  SpO2 96%  BMI 37.45 kg/m2    General appearance: No apparent distress, elderly, sleepy, appears stated age. HEENT: Pupils equal, round, and reactive to light. Conjunctivae/corneas clear. Oral mucosa moist.   Neck: Supple, with full range of motion. No jugular venous distention. Trachea midline. Respiratory:  Normal respiratory effort. Clear to auscultation, bilaterally without Rales/Wheezes/Rhonchi. On room air. Cardiovascular: Regular rate and rhythm with normal S1/S2 without murmurs, rubs or gallops. Abdomen: non-distended with normal bowel sounds. Unable to perform palpation as patient combative. Musculoskeletal: No clubbing, cyanosis or edema bilaterally. Full range of motion without deformity. Skin: Skin color, texture, turgor normal.  No rashes or lesions. Neurologic:  Neurovascularly intact without any focal sensory/motor deficits. Patient sleepy, when awake is combative. Does not follow along for cranial nerve testing. Alert and oriented to name.    Capillary Refill: Brisk,< 3 seconds   Peripheral Pulses: +2 palpable, equal bilaterally       Labs:   Recent Labs      09/22/17   1155   WBC  15.0*   HGB  13.7   HCT  40.1   PLT  361     Recent Labs      09/22/17   1223  09/23/17   0401   NA  137  134*   K  3.7  3.6   CL  97*  100   CO2  24  21*   BUN  35* 42*   CREATININE  0.9  0.9   CALCIUM  9.1  8.1*     Recent Labs      09/22/17   1223  09/23/17   0401   AST  25  17   ALT  19  14   BILIDIR  <0.2   --    BILITOT  0.6  0.3   ALKPHOS  108  78     Recent Labs      09/22/17   1200   INR  1.02     Recent Labs      09/22/17   2331   CKTOTAL  82       Urinalysis:    Lab Results   Component Value Date    NITRU NEGATIVE 09/22/2017    WBCUA 0-2 09/22/2017    BACTERIA NONE 09/22/2017    RBCUA 3-5 09/22/2017    BLOODU NEGATIVE 09/22/2017    SPECGRAV 1.028 09/22/2017    GLUCOSEU NEGATIVE 03/10/2015       Radiology:  CT LUMBAR RECONSTRUCTION WO POST PROCESS   Final Result   1. No acute fractures or acute subluxations. 2.  Discogenic and hypertrophic osteoarthritic/degenerative changes as described level by level mostly affecting the lower lumbar spine. These have a chronic appearance. **This report has been created using voice recognition software. It may contain minor errors which are inherent in voice recognition technology. **      Final report electronically signed by Dr. Neo Sherwood on 9/22/2017 10:18 PM      CT THORACIC RECONSTRUCTION WO POST PROCESS   Final Result   1. No acute fractures or acute subluxations. 2.  Minimal degenerative changes. 3.  No obvious discogenic acute processes or disc bulges. CT is less sensitive than MRI for disc pathology. **This report has been created using voice recognition software. It may contain minor errors which are inherent in voice recognition technology. **      Final report electronically signed by Dr. Neo Sherwood on 9/22/2017 10:15 PM      CT ABDOMEN PELVIS W IV CONTRAST Additional Contrast? None   Final Result   1. Very large fluid-filled hiatal hernia. The intra-abdominal portion of the stomach is distended with a large gas fluid level. 2.  No bowel obstruction.    3.  Increase in the size of the left ovarian cyst. In the postmenopausal patient systems this side is almost certainly benign

## 2017-09-23 NOTE — CONSULTS
Department of Psychiatry  Consult Service  Attending Physician Psychiatric Assessment      Thank you very much for allowing us to participate in the care of this patient. Reason for Consult:  Increase combativeness     Referring Physician:   Lottie Cuellar MD, [facility]     History obtained from:  patient, her daughter Puneet Esparza) and sister Liudmilarmiguel Hahn), electronic medical record and the staff on the unit    REASON FOR ADMISSION & HISTORY OF PRESENT ILLNESS: Per Dr. Karmen Garcia H&P    65 Year old white female     CHIEF COMPLAINT:  A 40-year-old white female with chief complaint of  worsening Alzheimer's dementia, weakness in the lower extremities and  cannot walk, urinary retention, poor appetite, aspiration into the  airways, shortness of breath.     HISTORY OF PRESENT ILLNESS:  This patient has had Alzheimer's dementia over the last few years. It was confirmed after several investigations. This time, last year, she was able to drive. She used to run out of the house, but she was able to get around and move around and feel better. Since 03/2017, she has slowly been going down, mobility has decreased. She is getting a little more angry. Medications have been readjusted so that she can take them just twice  a day. She used to go out to eat. She does not go out much now and recently, she has been choking on her spit. It looks like she had a  severe acid reflux today with heartburn and acid in her mouth. She is getting more and more short of breath with mild-to-moderate exertion. She has difficulty standing and walking. They have home physical therapy coming to see her. A wheelchair has been ordered, but not  arrived. Hospital bed has been ordered, but not arrived. She is brought in here because she is more short of breath, weak, tired, has  not been able to eat anything, cannot keep anything down, even her ills. She had a bit of vomiting. She has urinary retention which is  known to them.   She can go for days mg Oral Daily Jessica Chopra MD   60 mg at 17 1302    dicyclomine (BENTYL) tablet 20 mg  20 mg Oral BID Jessica Chopra MD   20 mg at 17 1302    ARIPiprazole (ABILIFY) tablet 2 mg  2 mg Oral Daily Jessica Chopra MD   2 mg at 17 1302    acetaminophen (TYLENOL) suppository 650 mg  650 mg Rectal Q8H PRN Jessica Chopra MD   650 mg at 17 2107    enoxaparin (LOVENOX) injection 40 mg  40 mg Subcutaneous Daily Jessica Chopra MD        piperacillin-tazobactam (ZOSYN) 3.375 g in dextrose 5 % 50 mL IVPB extended infusion (mini-bag)  3.375 g Intravenous Q8H Jessica Chopra MD   Stopped at 17 1630    dronabinol (MARINOL) capsule 2.5 mg  2.5 mg Oral BID Jessica Chopra MD        dextrose 5 % and 0.45 % sodium chloride infusion   Intravenous Continuous Jessica Chopra  mL/hr at 17 1354      pantoprazole (PROTONIX) tablet 40 mg  40 mg Oral QAM AC Jessica Chopra MD   40 mg at 17 1312      PAST PSYCHIATRIC HISTORY:  Dementia    Past psychiatric medications include: Effexor, Abilify, Donepezil, Xanax     Adverse reactions from psychotropic medications:  None reported     Lifetime Psychiatric Review of Systems:     Sonia: denies   Panic: denies  Phobia: denies  Hallucinations: denies  Delusions: denies     Past Medical History:    Past Medical History:   Diagnosis Date    Aspiration pneumonia ([address]  bilateral  from large hiatus hernia 2017    Dementia     Depression     GERD (gastroesophageal reflux disease)     Hyperlipidemia     Hypertension       Past Surgical History:        Procedure Laterality Date    CHOLECYSTECTOMY      COLONOSCOPY      UPPER GASTROINTESTINAL ENDOSCOPY       Allergies: No Known Allergies      Family Psychiatric History: unknown     FAMILY HISTORY:   more than 50 years. One daughter is here.    One stillbirth, another daughter  at 44 from aortic abdominal  aneurysm and this is why the patient has been depressed ever since. [employer] attendance, [place]. ALLERGIES:  None. Objective:     MENTAL STATUS EXAM:  Level of consciousness:  sleeping   Appearance: Hospital attire, seated in chair, with good grooming and hygiene   Behavior/Motor: No abnormalities noted    OPINIONS AND RECOMMENDATIONS:      1. Impression: Behavioral disturbance due to worsening Alzheimer's dementia and possible Benzo withdrawal  2. Looks better, now back on her regular meds and prn Xanax      Thank you very much for allowing us to participate in the care of this patient. Time spent 25 min.      Physicians Signature:  Electronically signed by Vandana Hernandez MD on 9/23/2017 at 5:32 PM

## 2017-09-23 NOTE — H&P
[address] [place] [address]                             HISTORY AND PHYSICAL    PATIENT NAME: Jacoby Vargas                                 :       1940  MED REC NO:   [id number]                                      ROOM:      [id number]  ACCOUNT NO:   [de-identified]                                      ADMISSION  DATE:  2017  PROVIDER:     Francisco Brown MD, [facility]    65 Year old white female    CHIEF COMPLAINT:  A 69-year-old white female with chief complaint of  worsening Alzheimer's dementia, weakness in the lower extremities and  cannot walk, urinary retention, poor appetite, aspiration into the  airways, shortness of breath. HISTORY OF PRESENT ILLNESS:  This patient has had Alzheimer's dementia  over the last few years. It was confirmed after several  investigations. This time, last year, she was able to drive. She  used to run out of the house, but she was able to get around and move  around and feel better. Since 2017, she has slowly been going  down, mobility has decreased. She is getting a little more angry. Medications have been readjusted so that she can take them just twice  a day. She used to go out to eat. She does not go out much now and  recently, she has been choking on her spit. It looks like she had a  severe acid reflux today with heartburn and acid in her mouth. She is  getting more and more short of breath with mild-to-moderate exertion. She has difficulty standing and walking. They have home physical  therapy coming to see her. A wheelchair has been ordered, but not  arrived. Hospital bed has been ordered, but not arrived. She is  brought in here because she is more short of breath, weak, tired, has  not been able to eat anything, cannot keep anything down, even her  pills. She had a bit of vomiting. She has urinary retention which is  known to them.   She can go for days without urinating, extremities. PERIPHERAL NERVOUS SYSTEM:  Weakness in the lower extremities. SKIN:  No bruises, rashes, ecchymosis or petechial hemorrhages. EYES:  No double vision, blurred vision or cataracts. EARS:  No hearing loss, tinnitus, or ringing. PSYCHIATRIC:  She gets anger bursts and she does not recognize family  all the time. She has dementia, getting worse clinically. Psychiatric depression based on loss of children, one daughter and  another stillborn baby. PHYSICAL EXAMINATION:  GENERAL:  On examination, the patient opened her eyes, was pleasant,  smiling, following commands. FACE:  No paralysis. OROPHARYNX:  Soft and hard palate normal, dentition is normal.  NECK:  No stiffness or rigidity. Gag reflex was not tested. The  thyroid is not enlarged. CHEST:  Chest wall palpation, percussion normal.  On auscultation, a  few crackles in the lung bases. HEART:  Palpation, percussion, auscultation is normal.  First and  second heart sounds only heard. ABDOMEN:  Full, soft, nontender. Spleen, liver, and kidney not  enlarged. No hernias. GENITAL SYSTEM:  Not examined. MUSCULOSKELETAL SYSTEM:  Shoulders, elbows, wrists, fingers, hips,  knees, and ankles passive range of movements normal.  No swelling or  redness of any joints. NEUROLOGICAL SYSTEM:  Straight leg raise on the right is 60 degrees. Hips, knees and ankle flexors and extensors power is 4/5. Left side  straight leg raise could not be sustained, the leg is weak. She  cannot sustain it when I lift it, but she can move it horizontally,  power is at best at 2. Muscle tone is weaker on that side. Knee jerk  reflexes are absent on that side. Back exam was not done. Upper  extremities, straight arm raise is normal.   is better on both  sides, normal . SKIN:  No bruises, rashes, or ecchymosis. EARS:  External auditory canal, eardrums normal.  No perforation.   NOSE:  Nasal cavity, turbinates and maxillary, frontal and Diskitis is possible. Appetite has decreased. PLAN:  1. Start Marinol for appetite. 2.  Take off the Zocor for muscle weakness. 3.  Reconstruct images of the lumbar spine and thoracic spine. 4.  Hiatus hernia. The patient's bed should be elevated 30 to 40  degrees. No late night eating. No large meals. Surgery is  impossible at this age, not even advisable because of the severity and  worsening Alzheimer's dementia. Conservative management is advised. For pneumonia, blood cultures x2, influenza A and B status. Zosyn  3.375 g q.6 h. and Zithromax 500 mg daily. DVT prophylaxis with  Lovenox 40 mg subcutaneously daily. [place] catheter is inserted now,  but family does not want it at home. She has been well-controlled  without it, so we can take that off.  5.  Code status is DNR-CCA. No shock, CPR, or intubation if the heart  should stop. Hospitalist Service is ordered. Physical therapy is  already being done at home. If we find any significant problem with  the lumbar spine, we might decide what to do. CPK is ordered to rule  out rhabdomyolysis. Prognosis is guarded. Worsening dementia is a  serious problem.         Maci Leon MD, [facility]    D: 09/22/2017 21:29:17       T: 09/22/2017 23:57:28     [email or web address]  Job#: [id number]     Doc#: [id number]

## 2017-09-23 NOTE — PROGRESS NOTES
Nutrition Assessment    Type and Reason for Visit: Initial, Positive Nutrition Screen (chewing and swallowing difficulties)    Nutrition Recommendations: Diet start per SLP results - will add ONS once on diet. Malnutrition Assessment:  · Malnutrition Status: No malnutrition    Nutrition Diagnosis:   · Problem: Inadequate oral intake  · Etiology: related to Difficulty swallowing     Signs and symptoms:  as evidenced by NPO status due to medical condition (await SLP evaluation)    Nutrition Assessment:  · Subjective Assessment: Pt., spouse and daughter seen this am; pt. was eating well at home up until the past few days(pulled pork and [place] favorites) but had trouble later at night taking pills and recently po has declined; note HH and no OR planned for now; SLP tried to see today; glucose 111; BM 0; meds include marinol; discussed ONS options for when diet is ordered but currently still NPO; pt. with dementia but has been eating well pta with no weight loss  - has actually gained since last year.    · Wound Type: None  · Current Nutrition Therapies:  · Oral Diet Orders: NPO   · Oral Nutrition Supplement (ONS) Orders:  (will start Ensure Enlive once on diet)  · Anthropometric Measures:  · Ht: 5' 1\" (154.9 cm)   · Current Body Wt: 198 lb (89.8 kg) (9/23 with no edema)  · Admission Body Wt: 198 lb (89.8 kg) (9/23 with no edema)  · Usual Body Wt: 189 lb (85.7 kg) (8/25/16 per EMR)  · Ideal Body Wt: 105 lb (47.6 kg), % Ideal Body 189%  · Adjusted Body Wt: 128 lb (58.1 kg), body weight adjusted for Obesity  · BMI Classification: BMI 35.0 - 39.9 Obese Class II (37.5)  · Comparative Standards (Estimated Nutrition Needs):  · Estimated Daily Total Kcal: 0033-5346  · Estimated Daily Protein (g): 70-75 grams    Estimated Intake vs Estimated Needs: Intake Less Than Needs (currently NPO)    Nutrition Risk Level: High    Nutrition Interventions:   Continue NPO, Start oral diet, Start ONS (when ok with SLP and MD)  Continued Inpatient Monitoring, Education Initiated (discussed ONS options for once diet starts as pt.and for home)    Nutrition Evaluation:   · Evaluation: Goals set   · Goals: adequate nutrient intake in 1-4 days    · Monitoring: NPO Status, Mental Status/Confusion, Weight, Pertinent Labs, Chewing/Swallowing    See Adult Nutrition Doc Flowsheet for more detail.      Electronically signed by Sonya Knight RD, OTIS on 9/23/17 at 4:09 PM    Contact Number: [phone]

## 2017-09-23 NOTE — ED NOTES
Emotional support offered to patient's family. Patient and family verbalized understanding to [facility] with Dr. Evan Rojas. Patient and family updated on admission to , verbalized understanding.       Rey Bustillos RN  09/22/17 4523

## 2017-09-24 PROBLEM — F02.818 ALZHEIMER'S DEMENTIA WITH BEHAVIORAL DISTURBANCE (HCC): Status: ACTIVE | Noted: 2017-09-22

## 2017-09-24 LAB
ANION GAP SERPL CALCULATED.3IONS-SCNC: 15 MEQ/L (ref 8–16)
BUN BLDV-MCNC: 32 MG/DL (ref 7–22)
CALCIUM IONIZED: 1.1 MMOL/L (ref 1.12–1.32)
CALCIUM SERPL-MCNC: 7.7 MG/DL (ref 8.5–10.5)
CHLORIDE BLD-SCNC: 104 MEQ/L (ref 98–111)
CO2: 21 MEQ/L (ref 23–33)
CREAT SERPL-MCNC: 1.3 MG/DL (ref 0.4–1.2)
GFR SERPL CREATININE-BSD FRML MDRD: 40 ML/MIN/1.73M2
GLUCOSE BLD-MCNC: 123 MG/DL (ref 70–108)
HCT VFR BLD CALC: 32.3 % (ref 37–47)
HEMOGLOBIN: 10.7 GM/DL (ref 12–16)
MAGNESIUM: 2.2 MG/DL (ref 1.6–2.4)
MCH RBC QN AUTO: 28.3 PG (ref 27–31)
MCHC RBC AUTO-ENTMCNC: 33.1 GM/DL (ref 33–37)
MCV RBC AUTO: 85.6 FL (ref 81–99)
PDW BLD-RTO: 16.1 % (ref 11.5–14.5)
PLATELET # BLD: 282 THOU/MM3 (ref 130–400)
PMV BLD AUTO: 7.2 MCM (ref 7.4–10.4)
POTASSIUM SERPL-SCNC: 3.4 MEQ/L (ref 3.5–5.2)
PROCALCITONIN: 0.05 NG/ML (ref 0.01–0.09)
RBC # BLD: 3.78 MILL/MM3 (ref 4.2–5.4)
SODIUM BLD-SCNC: 140 MEQ/L (ref 135–145)
URINE CULTURE, ROUTINE: NORMAL
WBC # BLD: 10.1 THOU/MM3 (ref 4.8–10.8)

## 2017-09-24 PROCEDURE — 80048 BASIC METABOLIC PNL TOTAL CA: CPT

## 2017-09-24 PROCEDURE — 85027 COMPLETE CBC AUTOMATED: CPT

## 2017-09-24 PROCEDURE — 82330 ASSAY OF CALCIUM: CPT

## 2017-09-24 PROCEDURE — 6360000002 HC RX W HCPCS: Performed by: INTERNAL MEDICINE

## 2017-09-24 PROCEDURE — 97530 THERAPEUTIC ACTIVITIES: CPT

## 2017-09-24 PROCEDURE — 83735 ASSAY OF MAGNESIUM: CPT

## 2017-09-24 PROCEDURE — 99233 SBSQ HOSP IP/OBS HIGH 50: CPT | Performed by: PHYSICIAN ASSISTANT

## 2017-09-24 PROCEDURE — 84145 PROCALCITONIN (PCT): CPT

## 2017-09-24 PROCEDURE — 97166 OT EVAL MOD COMPLEX 45 MIN: CPT

## 2017-09-24 PROCEDURE — A6447 CONFORM BAND S W >=5"/YD: HCPCS

## 2017-09-24 PROCEDURE — 2580000003 HC RX 258: Performed by: INTERNAL MEDICINE

## 2017-09-24 PROCEDURE — 36415 COLL VENOUS BLD VENIPUNCTURE: CPT

## 2017-09-24 PROCEDURE — 97110 THERAPEUTIC EXERCISES: CPT

## 2017-09-24 PROCEDURE — G8987 SELF CARE CURRENT STATUS: HCPCS

## 2017-09-24 PROCEDURE — 6370000000 HC RX 637 (ALT 250 FOR IP): Performed by: PHYSICIAN ASSISTANT

## 2017-09-24 PROCEDURE — 99231 SBSQ HOSP IP/OBS SF/LOW 25: CPT | Performed by: PSYCHIATRY & NEUROLOGY

## 2017-09-24 PROCEDURE — 6370000000 HC RX 637 (ALT 250 FOR IP): Performed by: INTERNAL MEDICINE

## 2017-09-24 PROCEDURE — 1200000003 HC TELEMETRY R&B

## 2017-09-24 PROCEDURE — G8988 SELF CARE GOAL STATUS: HCPCS

## 2017-09-24 RX ORDER — BENZONATATE 100 MG/1
100 CAPSULE ORAL 3 TIMES DAILY PRN
Status: DISCONTINUED | OUTPATIENT
Start: 2017-09-24 | End: 2017-09-24

## 2017-09-24 RX ORDER — POTASSIUM CHLORIDE 20 MEQ/1
40 TABLET, EXTENDED RELEASE ORAL ONCE
Status: COMPLETED | OUTPATIENT
Start: 2017-09-24 | End: 2017-09-24

## 2017-09-24 RX ADMIN — DRONABINOL 2.5 MG: 2.5 CAPSULE ORAL at 20:53

## 2017-09-24 RX ADMIN — PIPERACILLIN SODIUM,TAZOBACTAM SODIUM 3.38 G: 3; .375 INJECTION, POWDER, FOR SOLUTION INTRAVENOUS at 05:19

## 2017-09-24 RX ADMIN — ACETAMINOPHEN 650 MG: 650 SUPPOSITORY RECTAL at 18:45

## 2017-09-24 RX ADMIN — POTASSIUM CHLORIDE 40 MEQ: 1500 TABLET, EXTENDED RELEASE ORAL at 16:56

## 2017-09-24 RX ADMIN — LOPERAMIDE HYDROCHLORIDE 2 MG: 2 CAPSULE ORAL at 20:57

## 2017-09-24 RX ADMIN — HYDRALAZINE HYDROCHLORIDE 50 MG: 50 TABLET, FILM COATED ORAL at 10:17

## 2017-09-24 RX ADMIN — VENLAFAXINE HYDROCHLORIDE 75 MG: 75 CAPSULE, EXTENDED RELEASE ORAL at 10:16

## 2017-09-24 RX ADMIN — HYDRALAZINE HYDROCHLORIDE 50 MG: 50 TABLET, FILM COATED ORAL at 20:57

## 2017-09-24 RX ADMIN — PIPERACILLIN SODIUM,TAZOBACTAM SODIUM 3.38 G: 3; .375 INJECTION, POWDER, FOR SOLUTION INTRAVENOUS at 20:47

## 2017-09-24 RX ADMIN — ARIPIPRAZOLE 2 MG: 2 TABLET ORAL at 18:08

## 2017-09-24 RX ADMIN — DRONABINOL 2.5 MG: 2.5 CAPSULE ORAL at 10:23

## 2017-09-24 RX ADMIN — ARIPIPRAZOLE 2 MG: 2 TABLET ORAL at 10:18

## 2017-09-24 RX ADMIN — ASPIRIN 81 MG 81 MG: 81 TABLET ORAL at 20:58

## 2017-09-24 RX ADMIN — ALPRAZOLAM 0.5 MG: 0.5 TABLET ORAL at 12:30

## 2017-09-24 RX ADMIN — VENLAFAXINE HYDROCHLORIDE 75 MG: 75 CAPSULE, EXTENDED RELEASE ORAL at 18:07

## 2017-09-24 RX ADMIN — PIPERACILLIN SODIUM,TAZOBACTAM SODIUM 3.38 G: 3; .375 INJECTION, POWDER, FOR SOLUTION INTRAVENOUS at 12:34

## 2017-09-24 RX ADMIN — ALPRAZOLAM 0.5 MG: 0.5 TABLET ORAL at 20:53

## 2017-09-24 RX ADMIN — DONEPEZIL HYDROCHLORIDE 20 MG: 10 TABLET, FILM COATED ORAL at 10:19

## 2017-09-24 RX ADMIN — PANTOPRAZOLE SODIUM 40 MG: 40 TABLET, DELAYED RELEASE ORAL at 10:17

## 2017-09-24 ASSESSMENT — PAIN SCALES - GENERAL
PAINLEVEL_OUTOF10: 0

## 2017-09-24 NOTE — PLAN OF CARE
Problem: Discharge Planning:  Goal: Discharged to appropriate level of care  Discharged to appropriate level of care   Outcome: Ongoing  Plan to go home with spouse and family. Care giver from [facility]. Problem: Cardiac Output - Decreased:  Goal: Hemodynamic stability will improve  Hemodynamic stability will improve   Outcome: Ongoing  Vital signs are 125/60 and 118/59 with HR 68. Problem: Tissue Perfusion - Cardiopulmonary, Altered:  Goal: Absence of angina  Absence of angina   Outcome: Met This Shift  Denies any chest pain. Problem: Safety:  Goal: Ability to chew and swallow food without choking will improve  Ability to chew and swallow food without choking will improve   Outcome: Met This Shift  Pt is in high fowlers position. Small bites of food. No choking or difficulty with swallowing. Problem: Nutrition  Goal: Optimal nutrition therapy  Outcome: Ongoing  25 to 50% of meals taken. Enc fluids. Comments:   Care plan reviewed with patient and spouse. Patient and spouse verbalize understanding of the plan of care and contribute to goal setting.  Electronically signed by Elijah Perez RN on 9/24/2017 at 7:07 PM

## 2017-09-24 NOTE — PLAN OF CARE
Problem: Discharge Planning:  Goal: Discharged to appropriate level of care  Discharged to appropriate level of care   Outcome: Ongoing  Patient plans to discharge home with  and possible home health. Problem: Cardiac Output - Decreased:  Goal: Hemodynamic stability will improve  Hemodynamic stability will improve   Outcome: Ongoing  Patient will remain hemodynamically stable. Blood pressure stable and within normal range. Problem: Tissue Perfusion - Cardiopulmonary, Altered:  Goal: Absence of angina  Absence of angina   Outcome: Ongoing  No complaints of angina. Problem: Safety:  Goal: Ability to chew and swallow food without choking will improve  Ability to chew and swallow food without choking will improve   Outcome: Ongoing  Patient put in upright position and given small bites of food and pills. Problem: Nutrition  Goal: Optimal nutrition therapy  Outcome: Ongoing  Patient started on Marinol. Care plan reviewed with patient and . Patient and  verbalize understanding of the plan of care and contribute to goal setting.

## 2017-09-24 NOTE — PROGRESS NOTES
Swallow  [] Decreased Hyolaryngeal Elevation [] Audible Swallow   [] Suspected Pharyngeal Residue due to spontaneous multiple swallow. [] OTHER:    SIGNS AND SYMPTOMS OF LARYNGEAL PENETRATION / ASPIRATION:  [x] NO sign/symptoms of aspiration evident with this evaluation, but cannot rule out silent aspiration. [] Throat Clear  [] Immediate Cough [] Delayed Cough [] Wet Vocal Quality  [] Change in Pulmonary Status  [] OTHER:    IMPRESSIONS: Pt with limited participation x2 significant attempts on this date. During first (am) attempt, pt combative and refused PO trials. Pt pressed lips together, yelled at therapist to \"shut up and leave,\" and attempted to hit and bite therapist.  Pt with limited responsiveness to daughter who was present and supportive throughout. Following pharmacological intervention (per physician orders), repeat attempt completed. Pt's daughter and sister present and offered encouragement. Pt initially with limited participation, however agreeable to trial sips of water. Pt did tolerate well x2 and agreeable to take pill for RN. Pt became significantly fatigued and verbally agreed to continue, however was unable to maintain a minimal level of alertness to participate. Pt became less responsive to verbal stimuli as well as tactile (sternal rub) feedback and evaluation was discontinued. Daughter reports that xanax \"usually kicks in about 15 minutes after taking it, making her more agreeable, but then makes her very tired very quickly. \"    Pt and RN discussed plan to accommodate periods of optimal alertness and willingness to participate by allowing pt to complete supervised and controlled trials of PO, pending repeat SLP attempt for assessment. Reviewed s/s of difficulty and education completed on ending any and all PO trials should pt demonstrate difficulty. Recommend repeat BSE Monday AM (9/25). ST to f/u.       RECOMMENDATIONS:     Modified Barium Swallow: [] Is indicated to further assess    [x] Is NOT indicated at this time; Will recommend as appropriate. DIET RECOMMENDATIONS:  PO as tolerated with ongoing assessment to determine safest consistency    STRATEGIES: [] Strategies pending MBS results. [x] Full upright position  [] Small bite/sip [] No Straw [] Multiple Swallow  [] Chin tuck [] Head turn [x] Pulmonary monitoring [] Oral care after all meals  [] Supervision  [] Medication in applesauce [x]Direct 1:1 Supervision  [] Spoon all liquids [] Alternate solid / liquid [x] Limit distractions [x] Monitor for fatigue  [] PMV in place for all po [] OTHER:      EDUCATION:   Learner: [x]Patient [] Significant other [x] Son/Daughter [] Parent     [x] Other: Sister   Education: [x] Reviewed results and recommendations of this evaluation     [x] Reviewed diet and strategies     [x] Reviewed signs, symptoms and risk of aspiration     [] Demonstrated how to thick liquid appropriately. [] Reviewed goals and Plan of Care     [] OTHER:   Method: [x] Discussion [] Demonstration [] Hand-out     [] OTHER:   Evaluation of Education:     [x] Verbalizes understanding-family [] Demonstrates with assistance     [] Demonstrates without assistance []Needs further instruction     [x] No evidence of learning-patient [] Family not present    PATIENT GOALS: [] Pt did not state. Will further assess during treatment. [x] Return to the least restricted diet possible- per family; pt did not state a goal     [] Return to previous level of function     [] OTHER:    PLAN / TREATMENT RECOMMENDATIONS:  [] No further speech therapy services indicated. [] Speech Therapy evaluation to assess speech, language, cognition and/or voice  [] Recommendations pending MBS  [] Skilled SLP intervention on IP Rehab or TCU 30 minutes per day 5 days per week. Specific interventions for next session may include:   [x] Skilled SLP intervention on acute care 3-5 x per week or until goals met and/or pt plateaus in function. Specific interventions for next session may include: repeat BSE       SHORT TERM GOALS:    Short-term Goals  Timeframe for Short-term Goals: one week  Goal 1: Pt will participate in full bedside swallow evalution to determine least restrictive PO diet.       Radha Born MS CCC/SLP  SP [id number]

## 2017-09-24 NOTE — PROGRESS NOTES
Pt is becoming increase in restlessness and aggitation. Daughter at bedside. Pt is pulling at russell trying to pull it out. Xanax 0.5 mg given as per order for increase aggitiation. Pt took medication in pudding.

## 2017-09-24 NOTE — CONSULTS
Department of Psychiatry  Consult Service  Attending Physician Progress Note     Thank you very much for allowing us to participate in the care of this patient. Reason for Consult:  Increase combativeness     Referring Physician:   Lucia Miller MD, [facility]     History obtained from:  patient, her daughter Orville Schrader) and sister Kelly Arceo), electronic medical record and the staff on the unit    REASON FOR ADMISSION & HISTORY OF PRESENT ILLNESS: Per Dr. Shawn Tripp H&P    65 Year old white woman     CHIEF COMPLAINT:  A 76-year-old white female with chief complaint of worsening Alzheimer's dementia, weakness in the lower extremities and  cannot walk, urinary retention, poor appetite, aspiration into the airways, shortness of breath.     HISTORY OF PRESENT ILLNESS:  This patient has had Alzheimer's dementia over the last few years. It was confirmed after several investigations. This time, last year, she was able to drive. She used to run out of the house, but she was able to get around and move around and feel better. Since 03/2017, she has slowly been going down, mobility has decreased. She is getting a little more angry. Medications have been readjusted so that she can take them just twice  a day. She used to go out to eat. She does not go out much now and recently, she has been choking on her spit. It looks like she had a  severe acid reflux today with heartburn and acid in her mouth. She is getting more and more short of breath with mild-to-moderate exertion. She has difficulty standing and walking. They have home physical therapy coming to see her. A wheelchair has been ordered, but not  arrived. Hospital bed has been ordered, but not arrived. She is brought in here because she is more short of breath, weak, tired, has  not been able to eat anything, cannot keep anything down, even her ills. She had a bit of vomiting. She has urinary retention which is  known to them.   She can go for days without urinating, but she does not wet the bed. She has chronic low back pain which is severe at  times, do not know if it goes down the legs. Her memory is bad. Recognizes  only sometimes. Knows her daughter's name, but may  not even know their relationship. When she came here, blood pressure 160/120, pulse 82 per minute, respirations 20, temperature 99.1 and  went up to 100.9. Pulse oximetry 94% on room air, 96% on 1 L of nasal canula oxygen.     Psychiatric Consult Note:  Patient was seen with her sister Anu Lopez) and her daughter Radha Zarate. Consult was made because the patient was combative. Patient has a hx of demential and lives with her . Hx was from her daughter. Per daughter, patient is being taken care of by her , unsure if her medications are being administered as ordered at home. Patient was combative since on admission but has been much calmer since she was given Xanax 0.5mg which she was on at home per PCP. Patient is noted to be calm and resting at the time of my evaluation    Progress 9/24/17:  Patient seen this am with her . She is conscious and alert and  says she is much better. No episode of agitation reported.  says they are hoping to get to see a psychiatric provider in the community since it is now difficult for them to see Dr. Romana Lee.   I suggest they call Dr. Vale Marin office for possibility of getting in to see either Dr. Vale Marin or his associates    Current Facility-Administered Medications   Medication Dose Route Frequency Provider Last Rate Last Dose    benzonatate (TESSALON) capsule 100 mg  100 mg Oral TID PRN Oren Mckenzie MD        influenza quadrivalent split vaccine (FLUZONE;FLUARIX) injection 0.5 mL  0.5 mL Intramuscular Once Lalo Vides MD        ALPRAZolam Charissa Fallow) tablet 0.5 mg  0.5 mg Oral Q8H PRN Monalisa Bryan PA-C   0.5 mg at 09/23/17 1251    donepezil (ARICEPT) tablet 20 mg  20 mg Oral Daily Monalisa Bryan PA-C  ipratropium-albuterol (DUONEB) nebulizer solution 1 ampule  1 ampule Inhalation Q4H PRN Monalisa Bryan PA-C        ARIPiprazole (ABILIFY) tablet 2 mg  2 mg Oral BID Monalisa Bryan PA-C   2 mg at 09/23/17 2208    hydrALAZINE (APRESOLINE) tablet 50 mg  50 mg Oral BID Monalisa Bryan PA-C        venlafaxine (EFFEXOR XR) extended release capsule 75 mg  75 mg Oral BID Leia Martinez MD   75 mg at 09/23/17 2208    loperamide (IMODIUM) capsule 2 mg  2 mg Oral Nightly Leia Martinez MD   2 mg at 09/23/17 2208    aspirin chewable tablet 81 mg  81 mg Oral Nightly Leia Martinez MD   81 mg at 09/23/17 2208    acetaminophen (TYLENOL) suppository 650 mg  650 mg Rectal Q8H PRN Leia Martinez MD   650 mg at 09/22/17 2107    enoxaparin (LOVENOX) injection 40 mg  40 mg Subcutaneous Daily Leia Martinez MD        piperacillin-tazobactam (ZOSYN) 3.375 g in dextrose 5 % 50 mL IVPB extended infusion (mini-bag)  3.375 g Intravenous Q8H Leia Martinez MD 12.5 mL/hr at 09/24/17 0519 3.375 g at 09/24/17 0519    dronabinol (MARINOL) capsule 2.5 mg  2.5 mg Oral BID Leia Martinez MD   2.5 mg at 09/23/17 2208    dextrose 5 % and 0.45 % sodium chloride infusion   Intravenous Continuous Leia Martinez  mL/hr at 09/23/17 1354      pantoprazole (PROTONIX) tablet 40 mg  40 mg Oral QAM AC Leia Martinez MD   40 mg at 09/23/17 1312        MENTAL STATUS EXAM:  Level of consciousness:  Awake and alert, not in distress sleeping   Appearance: Hospital attire, lying in bed   Behavior/Motor: No abnormalities noted  Mood: I am okay  Affect:  appropriate    OPINIONS AND RECOMMENDATIONS:      1. Impression: Behavioral disturbance due to worsening Alzheimer's dementia and possible Benzo withdrawal  2. Looks better, now back on her regular meds and prn Xanax  3. Clinically better  4.  Advice  to call Dr. Ly Anne office if they can take her as a patient  5. Discussed with the PA    Will sign off      Thank you very much for allowing us to participate in the care of this patient. Time spent 12min.      Physicians Signature:  Electronically signed by Vandana Hernandez MD on 9/24/2017 at 9:36 AM

## 2017-09-24 NOTE — PROGRESS NOTES
Hospitalist Progress Note    Patient:  Chelsey Rabago      Unit/Bed:8B-23/023-A    YOB: 1940    MRN: [id number]       Acct: [de-identified]     PCP: Gunnar Holt DO    Date of Admission: 9/22/2017    Chief Complaint: weakness in lower extremities, urinary retention, poor appetite, SOB. Hospital Course: This patient was admitted to [facility] on 9/22/17 for chief complaints as noted above. CTA of the chest completed in ED showing mild groundglass infiltrate in both lung fields, pna right posterior costophrenic sulcus. She was started on IV antibiotics for possible aspiration pneumonia. Patient has a history of dementia and family reported increasing agitated behavior at home. Psychiatry consulted due to worsening agitation. CT of lumbar spine in 9/22/17 showed no acute fractures of acute subluxations, discogenic and hypertrophic osteoarthritis/degenerative changes of L4/L5, L5-S1 significant loss of disc material chronic, disc and osteophyte extend into the right neural foramen with moderate to severe right neural foraminal stenosis. [place] catheter was placed due to urinary retention. On 9/23: speech therapy eval attempted but due to combative behavior unable to be completed. Subjective: Patient is resting in bed eating breakfast, her  is at the bedside helping her eat. The patient is much more calm this morning. She is pleasant, is able to tell me who her  is in full name. She is cooperative with interview and examination. Patient offers no complaints. She denies fever, chills, SOB, chest pain, abdominal pain, nausea, vomiting, bowel or urinary changes. She was able to eat an omelet without difficulty.         Medications:  Reviewed    Infusion Medications    dextrose 5 % and 0.45 % NaCl 100 mL/hr at 09/23/17 1354     Scheduled Medications    influenza virus vaccine  0.5 mL Intramuscular Once    donepezil  20 mg Oral Daily    ARIPiprazole  2 mg Oral BID    hydrALAZINE  50 mg Oral BID    venlafaxine  75 mg Oral BID    loperamide  2 mg Oral Nightly    aspirin  81 mg Oral Nightly    enoxaparin  40 mg Subcutaneous Daily    piperacillin-tazobactam  3.375 g Intravenous Q8H    dronabinol  2.5 mg Oral BID    pantoprazole  40 mg Oral QAM AC     PRN Meds: benzonatate, ALPRAZolam, ipratropium-albuterol, acetaminophen      Intake/Output Summary (Last 24 hours) at 09/24/17 0929  Last data filed at 09/24/17 0522   Gross per 24 hour   Intake          1727.63 ml   Output              630 ml   Net          1097.63 ml       Diet:  DIET GENERAL;    Exam:  BP (!) 152/67  Pulse 82  Temp 98.3 °F (36.8 °C) (Oral)   Resp 24  Ht 5' 1\" (1.549 m)  Wt 194 lb 12.8 oz (88.4 kg)  SpO2 90%  BMI 36.81 kg/m2    General appearance: No apparent distress, elderly, sitting up in bed eating breakfast, appears stated age. HEENT: Pupils equal, round, and reactive to light. Conjunctivae/corneas clear. Oral mucosa moist.   Neck: Supple, with full range of motion. No jugular venous distention. Trachea midline. Respiratory:  Normal respiratory effort. Clear to auscultation, bilaterally without Rales/Wheezes/Rhonchi. On room air O2 sat 95%. Cardiovascular: Regular rate and rhythm with normal S1/S2 without murmurs, rubs or gallops. Abdomen: non-distended with normal bowel sounds. No tenderness with palpation. Musculoskeletal: No clubbing, cyanosis or edema bilaterally. Full range of motion without deformity. Skin: Skin color, texture, turgor normal.  No rashes or lesions. Neurologic:  Neurovascularly intact without any focal sensory/motor deficits. Alert and oriented to name and her . Less agitated today. Follows commands, is appropriate with conversation.     Capillary Refill: Brisk,< 3 seconds   Peripheral Pulses: +2 palpable, equal bilaterally       Labs:   Recent Labs      09/22/17   1155  09/24/17   0637   WBC  15.0*  10.1   HGB  13.7  10.7*   HCT  40.1  32.3*   PLT  361  282     Recent Labs 09/22/17   1223  09/23/17   0401  09/24/17   0637   NA  137  134*  140   K  3.7  3.6  3.4*   CL  97*  100  104   CO2  24  21*  21*   BUN  35*  42*  32*   CREATININE  0.9  0.9  1.3*   CALCIUM  9.1  8.1*  7.7*     Recent Labs      09/22/17   1223  09/23/17   0401   AST  25  17   ALT  19  14   BILIDIR  <0.2   --    BILITOT  0.6  0.3   ALKPHOS  108  78     Recent Labs      09/22/17   1200   INR  1.02     Recent Labs      09/22/17   2331   CKTOTAL  82       Urinalysis:      Lab Results   Component Value Date    NITRU NEGATIVE 09/22/2017    WBCUA 0-2 09/22/2017    BACTERIA NONE 09/22/2017    RBCUA 3-5 09/22/2017    BLOODU NEGATIVE 09/22/2017    SPECGRAV 1.028 09/22/2017    GLUCOSEU NEGATIVE 03/10/2015       Radiology:  CT LUMBAR RECONSTRUCTION WO POST PROCESS   Final Result   1. No acute fractures or acute subluxations. 2.  Discogenic and hypertrophic osteoarthritic/degenerative changes as described level by level mostly affecting the lower lumbar spine. These have a chronic appearance. **This report has been created using voice recognition software. It may contain minor errors which are inherent in voice recognition technology. **      Final report electronically signed by Dr. Maki Gibson on 9/22/2017 10:18 PM      CT THORACIC RECONSTRUCTION WO POST PROCESS   Final Result   1. No acute fractures or acute subluxations. 2.  Minimal degenerative changes. 3.  No obvious discogenic acute processes or disc bulges. CT is less sensitive than MRI for disc pathology. **This report has been created using voice recognition software. It may contain minor errors which are inherent in voice recognition technology. **      Final report electronically signed by Dr. Maki Gibson on 9/22/2017 10:15 PM      CT ABDOMEN PELVIS W IV CONTRAST Additional Contrast? None   Final Result   1. Very large fluid-filled hiatal hernia.  The intra-abdominal portion of the stomach is distended with a large gas fluid consulted      Assessment/Plan:  Active Hospital Problems    Diagnosis Date Noted    Hiatus hernia with compression intrathoracic [K44.0] 09/22/2017     Priority: High     Class: Acute    Chest pain [R07.9] 09/22/2017     Priority: High     Class: Acute    Spinal stenosis at L4-L5 level  with left leg weakness and urinary retention [M48.06] 09/22/2017     Priority: High     Class: Acute    Weakness of left lower extremity  spinal stenosis  or cva [R29.898] 09/22/2017     Priority: High     Class: Acute    Urinary retention  neurogenic bladder [R33.9] 09/22/2017     Priority: High     Class: Chronic    Alzheimer's disease  worsening [G30.9] 09/22/2017     Priority: High     Class: Acute    Aspiration pneumonia ([address]  bilateral  from large hiatus hernia [J69.0] 09/22/2017     Priority: High     Class: Acute    Fever [R50.9]     Dehydration [E86.0]        1. Possible Aspiration Pneumonia (POA)--CTA of chest: mild groundglass infiltrate in both lung fields, pna right posterior costophrenic sulcus. procalcitonin 0.07, 0.06. Continue IV Zosyn (9/23). Treated with IV Azithromycin and IV Levaquin by ED. Repeat procalcitonin and chest xray in am, if negative discontinue antibiotics. 2. Chest pain--Resolved. troponin (-)x3. BNP 1048. 0. CK 82. CTA (-) for PE, aneurysm, dissection. Pt does not report chest pain during examination on 9/23 and 9/24. 3. Large Hiatal Hernia--CTA of chest: large hiatal hernia, moderately severe distention of stomach with fluid and air. Possibly causing aspiration for #1. Obtain swallow eval on 9/24, tolerating diet today. 4. KUMAR--Cr 1.3 on 9/24 compared to 0.9 on 9/23. Likely due to dehydration. Continue IV fluid hydration, recheck in am  5. SIRS (POA)--Resolving. WBC 15.0, initially febrile with tmax of 100.9. Flu a/b (-). Blood cultures obtained and no growth prelim. U/a (-). procal 0.07, lactic acid 1.0. Now afebrile, WBC 10.1. Treat as in #1. 6. Hypokalemia--mild, 3.4.

## 2017-09-24 NOTE — PROGRESS NOTES
Pt is repetitive and wants to go home. Pts spouse at desk asking for early medications to be given Abilify and Effexor. Given as per family request. Pt is having increase in agitation.

## 2017-09-25 ENCOUNTER — APPOINTMENT (OUTPATIENT)
Dept: GENERAL RADIOLOGY | Age: 77
DRG: 177 | End: 2017-09-25
Payer: MEDICARE

## 2017-09-25 LAB
ANION GAP SERPL CALCULATED.3IONS-SCNC: 13 MEQ/L (ref 8–16)
BUN BLDV-MCNC: 18 MG/DL (ref 7–22)
CALCIUM SERPL-MCNC: 7.9 MG/DL (ref 8.5–10.5)
CHLORIDE BLD-SCNC: 102 MEQ/L (ref 98–111)
CO2: 22 MEQ/L (ref 23–33)
CREAT SERPL-MCNC: 1 MG/DL (ref 0.4–1.2)
GFR SERPL CREATININE-BSD FRML MDRD: 54 ML/MIN/1.73M2
GLUCOSE BLD-MCNC: 92 MG/DL (ref 70–108)
HCT VFR BLD CALC: 30.8 % (ref 37–47)
HEMOGLOBIN: 10.5 GM/DL (ref 12–16)
MCH RBC QN AUTO: 28.7 PG (ref 27–31)
MCHC RBC AUTO-ENTMCNC: 34.1 GM/DL (ref 33–37)
MCV RBC AUTO: 84.2 FL (ref 81–99)
PDW BLD-RTO: 16.4 % (ref 11.5–14.5)
PLATELET # BLD: 273 THOU/MM3 (ref 130–400)
PMV BLD AUTO: 7.3 MCM (ref 7.4–10.4)
POTASSIUM SERPL-SCNC: 3.4 MEQ/L (ref 3.5–5.2)
RBC # BLD: 3.65 MILL/MM3 (ref 4.2–5.4)
SODIUM BLD-SCNC: 137 MEQ/L (ref 135–145)
WBC # BLD: 10.2 THOU/MM3 (ref 4.8–10.8)

## 2017-09-25 PROCEDURE — 97163 PT EVAL HIGH COMPLEX 45 MIN: CPT

## 2017-09-25 PROCEDURE — 6370000000 HC RX 637 (ALT 250 FOR IP): Performed by: PHYSICIAN ASSISTANT

## 2017-09-25 PROCEDURE — 36415 COLL VENOUS BLD VENIPUNCTURE: CPT

## 2017-09-25 PROCEDURE — G8979 MOBILITY GOAL STATUS: HCPCS

## 2017-09-25 PROCEDURE — 6370000000 HC RX 637 (ALT 250 FOR IP): Performed by: INTERNAL MEDICINE

## 2017-09-25 PROCEDURE — 97530 THERAPEUTIC ACTIVITIES: CPT

## 2017-09-25 PROCEDURE — G8978 MOBILITY CURRENT STATUS: HCPCS

## 2017-09-25 PROCEDURE — 99233 SBSQ HOSP IP/OBS HIGH 50: CPT | Performed by: PHYSICIAN ASSISTANT

## 2017-09-25 PROCEDURE — 6360000002 HC RX W HCPCS: Performed by: INTERNAL MEDICINE

## 2017-09-25 PROCEDURE — 85027 COMPLETE CBC AUTOMATED: CPT

## 2017-09-25 PROCEDURE — 1200000003 HC TELEMETRY R&B

## 2017-09-25 PROCEDURE — 80048 BASIC METABOLIC PNL TOTAL CA: CPT

## 2017-09-25 PROCEDURE — 72100 X-RAY EXAM L-S SPINE 2/3 VWS: CPT

## 2017-09-25 PROCEDURE — 94640 AIRWAY INHALATION TREATMENT: CPT

## 2017-09-25 PROCEDURE — 73564 X-RAY EXAM KNEE 4 OR MORE: CPT

## 2017-09-25 PROCEDURE — 72170 X-RAY EXAM OF PELVIS: CPT

## 2017-09-25 PROCEDURE — 71010 XR CHEST PORTABLE: CPT

## 2017-09-25 RX ORDER — POTASSIUM CHLORIDE 20 MEQ/1
40 TABLET, EXTENDED RELEASE ORAL ONCE
Status: COMPLETED | OUTPATIENT
Start: 2017-09-25 | End: 2017-09-25

## 2017-09-25 RX ADMIN — POTASSIUM CHLORIDE 40 MEQ: 1500 TABLET, EXTENDED RELEASE ORAL at 12:52

## 2017-09-25 RX ADMIN — HYDRALAZINE HYDROCHLORIDE 50 MG: 50 TABLET, FILM COATED ORAL at 21:23

## 2017-09-25 RX ADMIN — HYDRALAZINE HYDROCHLORIDE 50 MG: 50 TABLET, FILM COATED ORAL at 08:13

## 2017-09-25 RX ADMIN — PANTOPRAZOLE SODIUM 40 MG: 40 TABLET, DELAYED RELEASE ORAL at 08:13

## 2017-09-25 RX ADMIN — VENLAFAXINE HYDROCHLORIDE 75 MG: 75 CAPSULE, EXTENDED RELEASE ORAL at 21:23

## 2017-09-25 RX ADMIN — DRONABINOL 2.5 MG: 2.5 CAPSULE ORAL at 21:26

## 2017-09-25 RX ADMIN — ASPIRIN 81 MG 81 MG: 81 TABLET ORAL at 21:23

## 2017-09-25 RX ADMIN — DRONABINOL 2.5 MG: 2.5 CAPSULE ORAL at 08:19

## 2017-09-25 RX ADMIN — IPRATROPIUM BROMIDE AND ALBUTEROL SULFATE 1 AMPULE: .5; 3 SOLUTION RESPIRATORY (INHALATION) at 14:56

## 2017-09-25 RX ADMIN — ALPRAZOLAM 0.5 MG: 0.5 TABLET ORAL at 22:19

## 2017-09-25 RX ADMIN — ARIPIPRAZOLE 2 MG: 2 TABLET ORAL at 21:23

## 2017-09-25 RX ADMIN — ENOXAPARIN SODIUM 40 MG: 40 INJECTION SUBCUTANEOUS at 08:19

## 2017-09-25 RX ADMIN — LOPERAMIDE HYDROCHLORIDE 2 MG: 2 CAPSULE ORAL at 21:23

## 2017-09-25 RX ADMIN — VENLAFAXINE HYDROCHLORIDE 75 MG: 75 CAPSULE, EXTENDED RELEASE ORAL at 08:13

## 2017-09-25 RX ADMIN — DONEPEZIL HYDROCHLORIDE 20 MG: 10 TABLET, FILM COATED ORAL at 08:13

## 2017-09-25 RX ADMIN — ARIPIPRAZOLE 2 MG: 2 TABLET ORAL at 08:13

## 2017-09-25 ASSESSMENT — PAIN SCALES - GENERAL
PAINLEVEL_OUTOF10: 0
PAINLEVEL_OUTOF10: 0

## 2017-09-25 NOTE — PLAN OF CARE
Problem: Nutrition  Goal: Optimal nutrition therapy  Outcome: Ongoing  Nutrition Problem: Inadequate oral intake  Intervention: Food and/or Nutrient Delivery: Continue current diet, Start ONS  Nutritional Goals: Pt to consume 75% or more of meals during LOS

## 2017-09-25 NOTE — PROGRESS NOTES
Hospitalist Progress Note    Patient:  Ailyn Hughes      Unit/Bed:8B-23/023-A    YOB: 1940    MRN: [id number]       Acct: [de-identified]     PCP: Sudha Harrison DO    Date of Admission: 9/22/2017    Chief Complaint: weakness in lower extremities, urinary retention, poor appetite, SOB. Hospital Course: This patient was admitted to [facility] on 9/22/17 for chief complaints as noted above. CTA of the chest completed in ED showing mild groundglass infiltrate in both lung fields, pna right posterior costophrenic sulcus. She was started on IV antibiotics for possible aspiration pneumonia. Patient has a history of dementia and family reported increasing agitated behavior at home. Psychiatry consulted due to worsening agitation. CT of lumbar spine in 9/22/17 showed no acute fractures of acute subluxations, discogenic and hypertrophic osteoarthritis/degenerative changes of L4/L5, L5-S1 significant loss of disc material chronic, disc and osteophyte extend into the right neural foramen with moderate to severe right neural foraminal stenosis. [place] catheter was placed due to urinary retention. On 9/23: speech therapy eval attempted but due to combative behavior unable to be completed. 9/24: Patient improving now that all home medications resumed. Behavior much improved, feeling better. PT/OT consulted      Subjective: Patient is sitting up in bedside chair. She reports mild low back pain, states it is from laying in bed. She denies fever, chills, shortness of breath, chest pain, abdominal pain, nausea, vomiting.  She has a bowel movement this am.      Medications:  Reviewed    Infusion Medications    dextrose 5 % and 0.45 % NaCl 100 mL/hr at 09/23/17 1354     Scheduled Medications    potassium replacement protocol   Other RX Placeholder    influenza virus vaccine  0.5 mL Intramuscular Once    donepezil  20 mg Oral Daily    ARIPiprazole  2 mg Oral BID    hydrALAZINE  50 mg Oral BID    venlafaxine  75 mg Oral BID    loperamide  2 mg Oral Nightly    aspirin  81 mg Oral Nightly    enoxaparin  40 mg Subcutaneous Daily    dronabinol  2.5 mg Oral BID    pantoprazole  40 mg Oral QAM AC     PRN Meds: ALPRAZolam, ipratropium-albuterol, acetaminophen      Intake/Output Summary (Last 24 hours) at 09/25/17 0732  Last data filed at 09/25/17 0534   Gross per 24 hour   Intake             2152 ml   Output             1050 ml   Net             1102 ml       Diet:  DIET GENERAL;    Exam:  /64  Pulse 70  Temp 98.5 °F (36.9 °C) (Axillary)   Resp 18  Ht 5' 1\" (1.549 m)  Wt 194 lb 12.8 oz (88.4 kg)  SpO2 96%  BMI 36.81 kg/m2    General appearance: No apparent distress, elderly, sitting in bedside chair, appears stated age. HEENT: Pupils equal, round, and reactive to light. Conjunctivae/corneas clear. Oral mucosa moist.   Neck: Supple, with full range of motion. No jugular venous distention. Trachea midline. Respiratory:  Normal respiratory effort. Clear to auscultation, bilaterally without Rales/Wheezes/Rhonchi. On room air O2 sat 97%. Cardiovascular: Regular rate and rhythm with normal S1/S2 without murmurs, rubs or gallops. No reproducible chest tenderness. Abdomen: non-distended with normal bowel sounds. No tenderness with palpation. Musculoskeletal: No clubbing, cyanosis or edema bilaterally. Full range of motion without deformity, moves in chair. No back pain with palpation. Skin: Skin color, texture, turgor normal.  No rashes or lesions. Neurologic:  Neurovascularly intact without any focal sensory/motor deficits. Alert and oriented to name and her . Pleasant this am.  Follows commands, is appropriate with conversation.     Capillary Refill: Brisk,< 3 seconds   Peripheral Pulses: +2 palpable, equal bilaterally       Labs:   Recent Labs      09/22/17   1155  09/24/17   0637  09/25/17   0545   WBC  15.0*  10.1  10.2   HGB  13.7  10.7*  10.5*   HCT  40.1  32.3*  30.8*   PLT  361  282  273 Recent Labs      09/23/17   0401  09/24/17   0637  09/25/17   0545   NA  134*  140  137   K  3.6  3.4*  3.4*   CL  100  104  102   CO2  21*  21*  22*   BUN  42*  32*  18   CREATININE  0.9  1.3*  1.0   CALCIUM  8.1*  7.7*  7.9*     Recent Labs      09/22/17   1223  09/23/17   0401   AST  25  17   ALT  19  14   BILIDIR  <0.2   --    BILITOT  0.6  0.3   ALKPHOS  108  78     Recent Labs      09/22/17   1200   INR  1.02     Recent Labs      09/22/17   2331   CKTOTAL  82       Urinalysis:      Lab Results   Component Value Date    NITRU NEGATIVE 09/22/2017    WBCUA 0-2 09/22/2017    BACTERIA NONE 09/22/2017    RBCUA 3-5 09/22/2017    BLOODU NEGATIVE 09/22/2017    SPECGRAV 1.028 09/22/2017    GLUCOSEU NEGATIVE 03/10/2015       Radiology:  XR Chest Portable   Final Result   1. No acute cardiopulmonary process. **This report has been created using voice recognition software. It may contain minor errors which are inherent in voice recognition technology. **      Final report electronically signed by Dr. Chelsey Burns on 9/25/2017 6:36 AM      CT LUMBAR RECONSTRUCTION WO POST PROCESS   Final Result   1. No acute fractures or acute subluxations. 2.  Discogenic and hypertrophic osteoarthritic/degenerative changes as described level by level mostly affecting the lower lumbar spine. These have a chronic appearance. **This report has been created using voice recognition software. It may contain minor errors which are inherent in voice recognition technology. **      Final report electronically signed by Dr. Mitch Lo on 9/22/2017 10:18 PM      CT THORACIC RECONSTRUCTION WO POST PROCESS   Final Result   1. No acute fractures or acute subluxations. 2.  Minimal degenerative changes. 3.  No obvious discogenic acute processes or disc bulges. CT is less sensitive than MRI for disc pathology. **This report has been created using voice recognition software.  It may contain minor errors which are inherent in voice recognition technology. **      Final report electronically signed by Dr. Hany Bay on 9/22/2017 10:15 PM      CT ABDOMEN PELVIS W IV CONTRAST Additional Contrast? None   Final Result   1. Very large fluid-filled hiatal hernia. The intra-abdominal portion of the stomach is distended with a large gas fluid level. 2.  No bowel obstruction. 3.  Increase in the size of the left ovarian cyst. In the postmenopausal patient systems this side is almost certainly benign but should be followed annually with ultrasound. 4.  Stable small likely simple cyst in the left kidney inferiorly. 5.  Moderate diverticulosis of the colon which looks unchanged and shows no acute process associated with it. **This report has been created using voice recognition software. It may contain minor errors which are inherent in voice recognition technology. **      Final report electronically signed by Dr. Hany Bay on 9/22/2017 5:12 PM      CTA CHEST [address]   Final Result   1. No pulmonary emboli are seen. . Questionable pulmonary arterial hypertension. 2. Large hiatus hernia. Moderately severe distention of the stomach with fluid and air. 3. Mild infiltrates in both lungs. See comments above. **This report has been created using voice recognition software. It may contain minor errors which are inherent in voice recognition technology. **          Final report electronically signed by Dr. Trish Vickers on 9/22/2017 4:58 PM      XR Chest Portable   Final Result   Large fixed hiatal hernia. No definite acute findings. **This report has been created using voice recognition software. It may contain minor errors which are inherent in voice recognition technology. **      Final report electronically signed by Dr. Hui Ohara on 9/22/2017 12:53 PM          Diet: DIET GENERAL;    DVT prophylaxis: [x] Lovenox                                 [] SCDs

## 2017-09-25 NOTE — PROGRESS NOTES
[facility]PATIENT [facility]CC[facility] THERAPY  [facility] MED SURG 8B  EVALUATION    Time:  Time In: 434  Time Out: 1638  Timed Code Treatment Minutes: 45 Minutes  Minutes: 53          Date: 2017  Patient Name: Alois Councilman,   Gender: female      MRN: [id number]  : 1940  (65 y.o.)  Referring Practitioner: Amanda Méndez PA-C  Diagnosis: Chest Pain  Diagnosis: The primary encounter diagnosis was Fever, unspecified fever cause. A diagnosis of Dehydration was also pertinent to this visit. Additional Pertinent Hx: Admitted with urinary retention, LE weakness, SOB and poor appetite. CTA of the chest completed in ED showing mild groundglass infiltrate in both lung fields, pna right posterior costophrenic sulcus. She was started on IV antibiotics for possible aspiration pneumonia. Patient has a history of dementia and family reported increasing agitated behavior at home. Restrictions/Precautions:  Restrictions/Precautions: Fall Risk            Position Activity Restriction  Other position/activity restrictions: Head of bed at or above 30 degrees secondary to having a hiatus hernia and at rist for aspiration pneumonia. Alois Councilman  has a past medical history of Aspiration pneumonia ([address]  bilateral  from large hiatus hernia; Dementia; Depression; GERD (gastroesophageal reflux disease); Hyperlipidemia; and Hypertension. Alois Councilman  has a past surgical history that includes Colonoscopy; Cholecystectomy; and Upper gastrointestinal endoscopy. Subjective  Chart Reviewed: Yes (Internal medicine note; order review)  Response to previous treatment: Patient with no complaints from previous session  Family / Caregiver Present: Yes (Daughter and sister present and supportive; spouse present at the end of session)    Subjective: Pleasant  Comments: Pt was willing to try getting up. She had a catheter in and had not been out of bed yet.   She agreed to walk out of her room with required to correct errors made, Assistance required to identify errors made, Decreased awareness of errors  Insights: Not aware of deficits  Initiation: Requires cues for some  Sequencing: Requires cues for some  Cognition Comment: Impulsivity noted and pt had more SOB with walking and needed cues to not walk too far so that she could return to her room before she got too tired. Sensation  Overall Sensation Status: WNL    Posture: Fair (forward flexed posture at the hips)    Observation/Palpation  Posture: Fair (forward flexed posture at the hips)    Hand Dominance: Right    LUE PROM (degrees)  LUE PROM: WNL       LUE AROM (degrees)  LUE AROM : WNL  Left Hand PROM (degrees)  Left Hand PROM: WNL  Left Hand General PROM: OA noted in the jts of her fingers and thumb  Left Hand AROM (degrees)  Left Hand AROM: WNL    RUE PROM (degrees)  RUE PROM: WNL  RUE AROM (degrees)  RUE AROM : WNL  Right Hand PROM (degrees)  Right Hand PROM: WNL  Right Hand General PROM: OA noted in the jts of her fingers and thumb  Right Hand AROM (degrees)  Right Hand AROM: WNL    LUE Strength  L Hand Grasp: 4/5  LUE Strength Comment: 3+/5 deltoid; 3+/5 pectoral; 4/5 biceps/triceps                RUE Strength  R Hand Grasp: 4/5  RUE Strength Comment: 3+/5 deltoid; 3+/5 pectoral; 4/5 biceps/triceps                             Movements Are Fluid And Coordinated: Yes     Fine Motor Skills  Fine Motor Comment: No difficulty noted with holding hand of staff and family member while walking.            ADL  LE Dressing: Maximum assistance (donning her slippers)     Bed mobility  Supine to Sit: Moderate assistance  Scooting: Minimal assistance    Transfers  Sit to stand: Minimal assistance (from the edge of bed)  Stand to sit: Contact guard assistance (to the chair- with impulsivity noted - pt sat before her catheter or IV lines could be cleared out of her path.)    Balance  Sitting Balance: Stand by assistance  Standing Balance: Minimal Having OT and PT both ordered was mentioned. Family expressed expectation that pt would walk 2 times with therapy each day secondary to that being her primary change and safety concern about being able to return home. Questions were addressed. Assessment:  Assessment: Pt would benefit from continued skilled OT services to address above deficits. She presents with decreased functional mobility, ADLs and endurance. Pt was impulsive with decreased insight into the changes in her condition such as having a russell catheter or IV in her arm for administering medicine. Pt has dementia as her baseline. Pt has a hiatus hernia which has made it necessary to eat while having her head of bed elevated and to have her resting position to be at or above 30 degree angle. She had been having help with washing but she was able to do most of her dressing. She walked with assist of 1 person most of the time and then needed 2 person assist during the days leading to admission. She had weakness at this time as shown by difficulty with getting in and out of bed. She had SOB with walking but spouse indicated that is her baseline. Pt was not walking out of the house PTA. She did walk room to room, however, PTA. Prior to the last couple of weeks, pt also had been able to walk to use the bathroom without assist.    Performance deficits / Impairments: Decreased safe awareness, Decreased functional mobility , Decreased ADL status, Decreased strength  Prognosis: Fair  Discharge Recommendations: 24 hour supervision or assist, Home with Home health OT    Clinical Decision Making: Clinical Decision making was of Moderate Complexity as the result of analysis of data from a detailed assessment, a consideration of several treatment options, the presence of comorbidities affecting the plan of care and the need for minimal to moderate modifications or assistance required to complete the evaluation.     Patient Education:  Patient decision making during this evaluation, this patient is of medium complexity. OT G-codes  Functional Assessment Tool Used: Barthel Index of ADLs  Score: 10 of 20 possible  Functional Limitation: Self care  Self Care Current Status (): At least 40 percent but less than 60 percent impaired, limited or restricted  Self Care Goal Status ():  At least 20 percent but less than 40 percent impaired, limited or restricted

## 2017-09-25 NOTE — PROGRESS NOTES
Nutrition Assessment    Type and Reason for Visit: Reassess    Nutrition Recommendations: Continue current diet, ONS, and appetite stimulant as ordered. Malnutrition Assessment:  · Malnutrition Status: At risk for malnutrition    Nutrition Diagnosis:   · Problem: Inadequate oral intake  · Etiology: related to Difficulty swallowing     Signs and symptoms:  as evidenced by Patient report of (swallowing difficulty)    Nutrition Assessment:  · Subjective Assessment: Pt admitted d/t chest pain. Pt w/Alzheimer's and increased agitation. Pt seen- she reports decreased appetite PTA and states she would occasionally drink Ensure at home. Pt denies N/V/D/C. Last BM x1 on 9/23. Per RN, she is tolerating oral intake without chewing or difficulty swallowing, consuming 26-50% of most meals. On appetite stimulant. Pt reports she is willing to consume Ensure during LOS. · Wound Type: None  · Current Nutrition Therapies:  · Oral Diet Orders: General   · Oral Diet intake: 1-25%, 26-50%  · Oral Nutrition Supplement (ONS) Orders: Standard High Calorie Oral Supplement (Ensure Enlive TID- Chocolate)  · ONS intake: To start today  · Anthropometric Measures:  · Ht: 5' 1\" (154.9 cm)   · Current Body Wt: 194 lb 12.8 oz (88.4 kg) (+trace edema BLE)  · Admission Body Wt: 198 lb (89.8 kg) (9/23 with no edema)  · Usual Body Wt: 189 lb (85.7 kg) (8/25/16 per EMR)  · Ideal Body Wt: 105 lb (47.6 kg), % Ideal Body 185%  · Adjusted Body Wt: 128 lb (58.1 kg), body weight adjusted for Obesity  · BMI Classification: BMI 35.0 - 39.9 Obese Class II (36.9)  · Comparative Standards (Estimated Nutrition Needs):  · Estimated Daily Total Kcal: 5755-7583  · Estimated Daily Protein (g): 70-75 grams    Estimated Intake vs Estimated Needs: Intake Improving    Nutrition Risk Level:  Moderate    Nutrition Interventions:   Continue current diet, Start ONS  Continued Inpatient Monitoring, Education Initiated (Encouraged adequate po intake of meals during LOS)    Nutrition Evaluation:   · Evaluation: Goals set   · Goals: Pt to consume 75% or more of meals during LOS    · Monitoring: Meal Intake, Supplement Intake, Diet Tolerance, Chewing/Swallowing, Mental Status/Confusion, Weight, Comparative Standards, Pertinent Labs    See Adult Nutrition Doc Flowsheet for more detail.      Electronically signed by Alejandrina Prado RD, LD on 9/25/17 at 10:21 AM    Contact Number: [phone]

## 2017-09-25 NOTE — PROGRESS NOTES
[facility] 59  INPATIENT OCCUPATIONAL THERAPY  [facility] MED SURG 8B  DAILY NOTE    Time:  Time In: 9020  Time Out: 1450  Timed Code Treatment Minutes: 31 Minutes  Minutes: 31    Date: 2017  Patient Name: Brandin Louis,   Gender: female      Room: 8B-23/023-A  MRN: [id number]  : 1940  (65 y.o.)  Referring Practitioner: Juice Sandhu PA-C  Diagnosis: Chest Pain  Diagnosis: Chest pain  Additional Pertinent Hx: Admitted with urinary retention, LE weakness, SOB and poor appetite. CTA of the chest completed in ED showing mild groundglass infiltrate in both lung fields, pna right posterior costophrenic sulcus. She was started on IV antibiotics for possible aspiration pneumonia. Patient has a history of dementia and family reported increasing agitated behavior at home. Restrictions/Precautions:  Restrictions/Precautions: Fall Risk, General Precautions    Position Activity Restriction  Other position/activity restrictions: Head of bed at or above 30 degrees secondary to having a hiatus hernia and at rist for aspiration pneumonia. Past Medical History:   Diagnosis Date    Aspiration pneumonia (HCC)  bilateral  from large hiatus hernia 2017    Dementia     Depression     GERD (gastroesophageal reflux disease)     Hyperlipidemia     Hypertension      Past Surgical History:   Procedure Laterality Date    CHOLECYSTECTOMY      COLONOSCOPY      UPPER GASTROINTESTINAL ENDOSCOPY             Subjective  Patient assessed for rehabilitation services?: Yes    Subjective: RN okayed OT session. Per RN Therese Lee pt sustained a fall this AM in [place]. Pt is confused. Overall Orientation Status: Impaired  Orientation Level: Oriented to person;Disoriented to place; Disoriented to time;Disoriented to situation    Pain:  Pain Assessment  Patient Currently in Pain:  (Pt reports back pain-Pt unable to rank on scale. )       Objective  Overall Cognitive Status: Exceptions  Following Commands: Inconsistently follows commands  Memory: Decreased short term memory  Safety Judgement: Decreased awareness of need for assistance;Decreased awareness of need for safety  Problem Solving: Assistance required to generate solutions;Assistance required to implement solutions;Assistance required to correct errors made;Assistance required to identify errors made;Decreased awareness of errors  Insights: Not aware of deficits  Initiation: Requires cues for all  Sequencing: Requires cues for all    Bed mobility  Sit to Supine: Minimal assistance (To lift B LEs into bed-HOB elevated to 30 degrees d/t hernia)  Scooting: Maximal assistance (x 2 to scoot up in bed. )    Transfers  Sit to stand: Minimal assistance (From recliner with mod vc for hand placement. )  Stand to sit: Minimal assistance (Onto EOB. )     Balance  Sitting Balance: Stand by assistance  Standing Balance: Minimal assistance     Time: 20 seconds  Activity: preparing to walk     Functional Mobility  Functional - Mobility Device: No device  Assist Level: Minimal assistance (x 1)  Functional Mobility Comments: Hand held assist from recliner to EOB, slow pace, max vc for safety, forward posture. Activity Tolerance:  Activity Tolerance: Patient limited by fatigue;Treatment limited secondary to decreased cognition  Activity Tolerance: Pt was SOB upon arrival-RN aware. Pt requesting to go back to bed. Assessment:   Performance deficits / Impairments: Decreased safe awareness, Decreased functional mobility , Decreased ADL status, Decreased strength  Prognosis: Fair  Discharge Recommendations: 24 hour supervision or assist, Home with Home health OT, [address]    Patient Education:  Patient Education: Reorientation, transfer safety, breathing technique. Equipment Recommendations:  Equipment Needed: No    Safety:  Safety Devices in place: Yes  Type of devices:  All fall risk precautions in place, Bed alarm in place, Gait belt, Patient at risk for

## 2017-09-25 NOTE — PLAN OF CARE
Problem: Discharge Planning:  Goal: Discharged to appropriate level of care  Discharged to appropriate level of care   Outcome: Ongoing  Will be discharged home with . Problem: Cardiac Output - Decreased:  Goal: Hemodynamic stability will improve  Hemodynamic stability will improve   Outcome: Ongoing  Blood pressure 119/55, pulse 76. Problem: Tissue Perfusion - Cardiopulmonary, Altered:  Goal: Absence of angina  Absence of angina   Outcome: Ongoing  Denies chest pain. Problem: Safety:  Goal: Ability to chew and swallow food without choking will improve  Ability to chew and swallow food without choking will improve   Outcome: Ongoing  Tolerated oral intake without choking or difficulty swallowing. Head of bed up when pills given. Problem: Nutrition  Goal: Optimal nutrition therapy  Outcome: Met This Shift    Comments:   Pt aware of plan of care, continuing to update and work with patient to address needs and expectations.

## 2017-09-25 NOTE — PLAN OF CARE
Problem: Discharge Planning:  Goal: Discharged to appropriate level of care  Discharged to appropriate level of care   Outcome: Ongoing  Plans to go home at discharge with support of family. Family encouraged to get 24 hour care for patient at discharge.  into speak with family regarding discharge needs    Problem: Cardiac Output - Decreased:  Goal: Hemodynamic stability will improve  Hemodynamic stability will improve   Outcome: Ongoing  No c/o chest pain. Pulse 77 bp 147/91    Problem: Tissue Perfusion - Cardiopulmonary, Altered:  Goal: Absence of angina  Absence of angina   Outcome: Ongoing  No c/o chest pain    Problem: Safety:  Goal: Ability to chew and swallow food without choking will improve  Ability to chew and swallow food without choking will improve   Outcome: Ongoing  Tolerated lunch without problems. No choking noted    Problem: Nutrition  Goal: Optimal nutrition therapy  Outcome: Ongoing  No c/o nausea. Tolerated diet without emesis    Comments:   Care plan reviewed with patient. Patient verbalize understanding of the plan of care and contribute to goal setting.

## 2017-09-25 NOTE — PROGRESS NOTES
: No  Occupation: Retired  Leisure & Hobbies: Watching TV  Additional Comments: Pt walked without any AD with assist of 2 persons during the week prior to admission. Spouse reports had script for wheelchair which he placed on hold secondary to not thinking it was necessary d/t patient moving better recently. Also reports patient stays in bed for several days at a time at times. Objective:       RLE AROM: WFL         LLE AROM : WFL     Strength RLE: Exception  Comment: gross 4- to 4/5. difficult to assess secondary to dementia    Strength LLE: Exception  Comment: gross 4- to 4/5. difficult to assess secondary to dementia       Sensation  Overall Sensation Status: WNL    Balance  Sitting - Static: Fair  Sitting - Dynamic: Fair  Standing - Static: Fair  Standing - Dynamic: Fair, -    Supine to Sit: Minimal assistance (HOB elevated ~40 deg, verbal cues for performance with increased time to complete)  Scooting: Contact guard assistance (to EOB)    Transfers  Sit to Stand: Contact guard assistance (from EOB, HHA (per  how performed at home))  Stand to sit:  (to bedside chair, HHA for consistency at home, fair eccentric lowering)       Ambulation 1  Surface: level tile  Device: No Device, Hand-Held Assist  Assistance: Contact guard assistance  Quality of Gait: decreased velocity and belia, decreased step length B, poor heel strike B, slight forward flexed trunk, max verbal cues required for path and safety  Distance: 10ft         Exercises:  Comments: none          Activity Tolerance:  Activity Tolerance: Patient limited by cognitive status    Treatment Initiated: see exercises. Patient ambulated additional 30ft at [facility] see ambulation for details. Verbal cues provided for safety throughout distance and to increase step height and heel strike B for increased safety, minimal performance by patient    Assessment:   Body structures, Functions, Activity limitations: Decreased functional mobility , Decreased that patient is not to get up without staff present which he verbalized agreement to)    Plan:  Times per week: 3-5x GM  Specific instructions for Next Treatment: strengthening, sitting and standing balance, gait training, safety at home  Current Treatment Recommendations: Strengthening, Balance Training, Functional Mobility Training, Transfer Training, Endurance Training, Gait Training, Stair training, Home Exercise Program, Safety Education & Training, Equipment Evaluation, Education, & procurement, Patient/Caregiver Education & Training    Goals:  Patient goals : not stated  Short term goals  Time Frame for Short term goals: 1 week  Short term goal 1: patient to perform bed mobility at SBA to get in and out of bed  Short term goal 2: patient to perform transfers from various surfaces at SBA and HHA to rise from bed or chair  Short term goal 3: patient to ambulate 75ft with HHA at [facility] for household mobility  Short term goal 4: patient to negotiate 2 steps with one rail and HHA at [facility] for home access  Long term goals  Time Frame for Long term goals : defer d/t short ELOS    Evaluation Complexity: Based on the findings of patient history, examination, clinical presentation, and decision making during this evaluation, the evaluation of Lyndsey Mei  is of high complexity. PT G-Codes  Functional Assessment Tool Used: Professional Judgement  Functional Limitation: Mobility: Walking and moving around  Mobility: Walking and Moving Around Current Status (): At least 1 percent but less than 20 percent impaired, limited or restricted  Mobility: Walking and Moving Around Goal Status ():  At least 1 percent but less than 20 percent impaired, limited or restricted

## 2017-09-25 NOTE — PROGRESS NOTES
UPDATE:    After physical therapy assessment, patient was placed in bedside chair. The patient had to use the restroom after having russell catheter removed and her  assisted her to the restroom. The patient went to the restroom, was able to sit her on the toilet, and she stood up to wash her hands. Her , Whitney Ricks, states while the patient was washing her hands she had to urinate again so she turned with her back to the toilet. She missed the toilet while sitting down and slid down the wall landing on her bottom. Eugenio Mercado RN was sitting at the nurses station responded immediately after hearing a noise, door to the patient's room was closed and found the patient sitting upright on the floor. She reported no pain.  denies any head injury. He stated she slid slowly down the wall landing on her bottom. Patient was assisted back to bed, ambulating with assistance of Eugenio Mercado RN and Jesus Alberto Chinchilla and her RN, Adryan Phan who was in another room when fall occurred. Physical Exam:  MSK: no tenderness to palpation of thoracic, lumbar spine. No tenderness to palpation of hips or buttocks. No tenderness to palpation of left leg or knee. Full active ROM of left knee. Slight point tenderness to right knee, has full active ROM of right knee. Distal sensation in tact. Patient is denying any pain. Neuro: At baseline mentation. Alert to self, her , Whitney Ricks. She knows she is in [facility]. Plan:   Obtain xray of lumbar spine, pelvis and right knee. Addendum: Xray of lumbar spine, pelvis and right negative for fracture or dislocation. Discussed with Dr. Chance Morris, social work assisting with discharge planning.      Discussed with Dr Chance Morris  Electronically signed by Dion Celeste PA-C on 9/25/2017 at 10:42 AM

## 2017-09-26 LAB
ANION GAP SERPL CALCULATED.3IONS-SCNC: 13 MEQ/L (ref 8–16)
ANISOCYTOSIS: ABNORMAL
BASOPHILS # BLD: 0.7 %
BASOPHILS ABSOLUTE: 0.1 THOU/MM3 (ref 0–0.1)
BUN BLDV-MCNC: 12 MG/DL (ref 7–22)
CALCIUM SERPL-MCNC: 8.5 MG/DL (ref 8.5–10.5)
CHLORIDE BLD-SCNC: 104 MEQ/L (ref 98–111)
CO2: 21 MEQ/L (ref 23–33)
CREAT SERPL-MCNC: 0.8 MG/DL (ref 0.4–1.2)
EOSINOPHIL # BLD: 5 %
EOSINOPHILS ABSOLUTE: 0.4 THOU/MM3 (ref 0–0.4)
GFR SERPL CREATININE-BSD FRML MDRD: 69 ML/MIN/1.73M2
GLUCOSE BLD-MCNC: 81 MG/DL (ref 70–108)
HCT VFR BLD CALC: 30.8 % (ref 37–47)
HEMOGLOBIN: 10.4 GM/DL (ref 12–16)
LYMPHOCYTES # BLD: 19.4 %
LYMPHOCYTES ABSOLUTE: 1.4 THOU/MM3 (ref 1–4.8)
MCH RBC QN AUTO: 28.9 PG (ref 27–31)
MCHC RBC AUTO-ENTMCNC: 33.8 GM/DL (ref 33–37)
MCV RBC AUTO: 85.7 FL (ref 81–99)
MONOCYTES # BLD: 12.1 %
MONOCYTES ABSOLUTE: 0.9 THOU/MM3 (ref 0.4–1.3)
NUCLEATED RED BLOOD CELLS: 0 /100 WBC
PDW BLD-RTO: 16.9 % (ref 11.5–14.5)
PLATELET # BLD: 236 THOU/MM3 (ref 130–400)
PMV BLD AUTO: 7 MCM (ref 7.4–10.4)
POTASSIUM SERPL-SCNC: 3.9 MEQ/L (ref 3.5–5.2)
RBC # BLD: 3.59 MILL/MM3 (ref 4.2–5.4)
RBC # BLD: NORMAL 10*6/UL
SEG NEUTROPHILS: 62.8 %
SEGMENTED NEUTROPHILS ABSOLUTE COUNT: 4.5 THOU/MM3 (ref 1.8–7.7)
SODIUM BLD-SCNC: 138 MEQ/L (ref 135–145)
WBC # BLD: 7.2 THOU/MM3 (ref 4.8–10.8)

## 2017-09-26 PROCEDURE — 80048 BASIC METABOLIC PNL TOTAL CA: CPT

## 2017-09-26 PROCEDURE — 6360000002 HC RX W HCPCS: Performed by: INTERNAL MEDICINE

## 2017-09-26 PROCEDURE — 6370000000 HC RX 637 (ALT 250 FOR IP): Performed by: INTERNAL MEDICINE

## 2017-09-26 PROCEDURE — 85025 COMPLETE CBC W/AUTO DIFF WBC: CPT

## 2017-09-26 PROCEDURE — 36415 COLL VENOUS BLD VENIPUNCTURE: CPT

## 2017-09-26 PROCEDURE — 99232 SBSQ HOSP IP/OBS MODERATE 35: CPT | Performed by: NURSE PRACTITIONER

## 2017-09-26 PROCEDURE — 6370000000 HC RX 637 (ALT 250 FOR IP): Performed by: PHYSICIAN ASSISTANT

## 2017-09-26 PROCEDURE — 1200000003 HC TELEMETRY R&B

## 2017-09-26 RX ADMIN — LOPERAMIDE HYDROCHLORIDE 2 MG: 2 CAPSULE ORAL at 19:51

## 2017-09-26 RX ADMIN — HYDRALAZINE HYDROCHLORIDE 50 MG: 50 TABLET, FILM COATED ORAL at 16:39

## 2017-09-26 RX ADMIN — PANTOPRAZOLE SODIUM 40 MG: 40 TABLET, DELAYED RELEASE ORAL at 07:07

## 2017-09-26 RX ADMIN — ALPRAZOLAM 0.5 MG: 0.5 TABLET ORAL at 20:38

## 2017-09-26 RX ADMIN — DONEPEZIL HYDROCHLORIDE 20 MG: 10 TABLET, FILM COATED ORAL at 14:21

## 2017-09-26 RX ADMIN — VENLAFAXINE HYDROCHLORIDE 75 MG: 75 CAPSULE, EXTENDED RELEASE ORAL at 16:40

## 2017-09-26 RX ADMIN — ENOXAPARIN SODIUM 40 MG: 40 INJECTION SUBCUTANEOUS at 10:55

## 2017-09-26 RX ADMIN — ASPIRIN 81 MG 81 MG: 81 TABLET ORAL at 19:50

## 2017-09-26 RX ADMIN — ARIPIPRAZOLE 2 MG: 2 TABLET ORAL at 16:39

## 2017-09-26 ASSESSMENT — PAIN SCALES - GENERAL
PAINLEVEL_OUTOF10: 0

## 2017-09-26 NOTE — PROGRESS NOTES
[address] [id number]  PHYSICAL THERAPY MISSED TREATMENT NOTE  ACUTE CARE  [facility] MED SURG 8B              Missed Treatment  RN approved session and states pt received a xanax last night, pt asleep and not waking. Pt family present and supportive. Attempted to wake pt however becomes agitated and combative, refusing treatment.

## 2017-09-26 NOTE — PLAN OF CARE
Problem: Discharge Planning:  Goal: Discharged to appropriate level of care  Discharged to appropriate level of care   Outcome: Ongoing   following for discharge needs. Anticipating ECF on d/c. Problem: Cardiac Output - Decreased:  Goal: Hemodynamic stability will improve  Hemodynamic stability will improve   Outcome: Ongoing  Patient denies pain. VSS. Patient very drowsy today, arouses with physical stimulation. Spouse states that patient gets this way at home after xanax is given (patient received xanas last night). Spouse states it will make patient drowsy for up to 3 days. Patient is also confused, so it is difficult to get information from patient, nor to have patient follow commands. Problem: Falls - Risk of:  Goal: Will remain free from falls  Will remain free from falls   Outcome: Ongoing  Call light within reach, bed in lowest position, non skid footwear on, room door open, bed alarm on. Pt not using call light appropriately, spouse at bedside. Problem: Pain:  Goal: Pain level will decrease  Pain level will decrease   Outcome: Ongoing  Denies pain. Resting, respirations easy and unlabored. Comments:   Care plan reviewed with patient. Patient verbalize understanding of the plan of care and contribute to goal setting.

## 2017-09-26 NOTE — PROGRESS NOTES
Diet:  DIET GENERAL;  Dietary Nutrition Supplements: Standard High Calorie Oral Supplement    Exam:  /63  Pulse 84  Temp 98.5 °F (36.9 °C) (Oral)   Resp 20  Ht 5' 1\" (1.549 m)  Wt 194 lb 12.8 oz (88.4 kg)  SpO2 93%  BMI 36.81 kg/m2    General appearance: No apparent distress, appears stated age and cooperative. HEENT: Pupils equal, round. Conjunctivae/corneas clear. Respiratory:  Normal respiratory effort. Clear to auscultation, bilaterally without Rales/Wheezes/Rhonchi. Cardiovascular: Regular rate and rhythm with normal S1/S2 without murmurs, rubs or gallops. Abdomen: Soft, obese, non-tender, non-distended with normal bowel sounds. Musculoskeletal: No clubbing, cyanosis or edema bilaterally. Full range of motion without deformity. Skin: Skin color, texture, turgor normal.  No rashes or lesions. Neurologic:  Neurovascularly intact without any focal sensory/motor deficits. Cranial nerves: II-XII intact, grossly non-focal.  Psychiatric: Alert and oriented to self. Cooperative. Capillary Refill: Brisk,< 3 seconds   Peripheral Pulses: +1 palpable, equal bilaterally       Labs:   Recent Labs      09/24/17   0637  09/25/17   0545  09/26/17   0601   WBC  10.1  10.2  7.2   HGB  10.7*  10.5*  10.4*   HCT  32.3*  30.8*  30.8*   PLT  282  273  236     Recent Labs      09/24/17   0637  09/25/17   0545  09/26/17   0601   NA  140  137  138   K  3.4*  3.4*  3.9   CL  104  102  104   CO2  21*  22*  21*   BUN  32*  18  12   CREATININE  1.3*  1.0  0.8   CALCIUM  7.7*  7.9*  8.5     No results for input(s): AST, ALT, BILIDIR, BILITOT, ALKPHOS in the last 72 hours. No results for input(s): INR in the last 72 hours. No results for input(s): Dauna Kulwant in the last 72 hours.     Urinalysis:    Lab Results   Component Value Date    NITRU NEGATIVE 09/22/2017    WBCUA 0-2 09/22/2017    BACTERIA NONE 09/22/2017    RBCUA 3-5 09/22/2017    BLOODU NEGATIVE 09/22/2017    SPECGRAV 1.028 09/22/2017    GLUCOSEU NEGATIVE 03/10/2015       Radiology:  XR LUMBAR SPINE (2-3 VIEWS)   Final Result   1. No acute fractures or acute subluxations. 2.  Mild degenerative changes, and scoliosis as described. **This report has been created using voice recognition software. It may contain minor errors which are inherent in voice recognition technology. **      Final report electronically signed by Dr. Bran on 9/25/2017 11:08 AM      XR KNEE RIGHT (MIN 4 VIEWS)   Final Result    Normal-appearing 4 views of the knee. **This report has been created using voice recognition software. It may contain minor errors which are inherent in voice recognition technology. **      Final report electronically signed by Dr. Bran on 9/25/2017 11:05 AM      XR PELVIS (1-2 VW)   Final Result   1. No acute fractures or dislocations. 2.  Dense vascular calcifications. .               **This report has been created using voice recognition software. It may contain minor errors which are inherent in voice recognition technology. **      Final report electronically signed by Dr. Bran on 9/25/2017 11:03 AM      XR Chest Portable   Final Result   1. No acute cardiopulmonary process. **This report has been created using voice recognition software. It may contain minor errors which are inherent in voice recognition technology. **      Final report electronically signed by Dr. Miranda Bowens on 9/25/2017 6:36 AM      CT LUMBAR RECONSTRUCTION WO POST PROCESS   Final Result   1. No acute fractures or acute subluxations. 2.  Discogenic and hypertrophic osteoarthritic/degenerative changes as described level by level mostly affecting the lower lumbar spine. These have a chronic appearance. **This report has been created using voice recognition software. It may contain minor errors which are inherent in voice recognition technology. **      Final report electronically signed by Dr. Bran on 9/22/2017 10:18 PM      CT THORACIC RECONSTRUCTION WO POST PROCESS   Final Result   1. No acute fractures or acute subluxations. 2.  Minimal degenerative changes. 3.  No obvious discogenic acute processes or disc bulges. CT is less sensitive than MRI for disc pathology. **This report has been created using voice recognition software. It may contain minor errors which are inherent in voice recognition technology. **      Final report electronically signed by Dr. Stu Tolentino on 9/22/2017 10:15 PM      CT ABDOMEN PELVIS W IV CONTRAST Additional Contrast? None   Final Result   1. Very large fluid-filled hiatal hernia. The intra-abdominal portion of the stomach is distended with a large gas fluid level. 2.  No bowel obstruction. 3.  Increase in the size of the left ovarian cyst. In the postmenopausal patient systems this side is almost certainly benign but should be followed annually with ultrasound. 4.  Stable small likely simple cyst in the left kidney inferiorly. 5.  Moderate diverticulosis of the colon which looks unchanged and shows no acute process associated with it. **This report has been created using voice recognition software. It may contain minor errors which are inherent in voice recognition technology. **      Final report electronically signed by Dr. Stu Tolentino on 9/22/2017 5:12 PM      CTA CHEST [address]   Final Result   1. No pulmonary emboli are seen. . Questionable pulmonary arterial hypertension. 2. Large hiatus hernia. Moderately severe distention of the stomach with fluid and air. 3. Mild infiltrates in both lungs. See comments above. **This report has been created using voice recognition software. It may contain minor errors which are inherent in voice recognition technology. **          Final report electronically signed by Dr. Oksana Hernandez on 9/22/2017 4:58 PM      XR Chest Portable   Final Result   Large fixed hiatal hernia.  No

## 2017-09-26 NOTE — PROGRESS NOTES
Patient resting comfortably in bed. No concerns voiced at this time. Call light and personal items within reach.

## 2017-09-26 NOTE — PROGRESS NOTES
0800 Patient drowsy, arousing to physical stimuli. Unable to given medications safely due to patients condition. 0900 Patient still drowsy, -butch states that he was able to get 1/3 of ensure drank by patient. Patients eyes are closed, respirations even and unlabored. 1200 when attempting to get vital signs patient became agitated physically and verbally with foul language. Patient will not let staff turn her. Spouse encouraging patient and patient still refusing. 55 Daughter here and has concerns that patients \"belly hurts\". This RN attempted to assess patient and when asked patient if belly hurt patient states \"yes\". However patient also states yes when asking if her legs hurt and arms hurt. When trying to palpate abdomen patient attempted to hit staff but abdomen was soft, non distended. Will continue to watch patient.

## 2017-09-26 NOTE — PROGRESS NOTES
[address] [facility] THERAPY MISSED TREATMENT NOTE  [facility] MED SURG 8B      Date: 2017  Patient Name: Jeannie Choudhury        MRN: [id number]    : 1940  (65 y.o.)    REASON FOR MISSED TREATMENT:  Attempted to see patient for repeat of full bedside swallow evaluation. MARIANELA Holland reports patient with poor compliance and combative when awake. MARIANELA Holland reports good success with PO medication. Patient currently unavailable d/t nursing needs with nurse tech. Will check back as schedule permits or . ST informed MARIANELA Holland to please contact  via acute phone if concerns for aspiration present.      Lo Woods M.S. Mishaolamide

## 2017-09-27 LAB
ALBUMIN SERPL-MCNC: 3.1 G/DL (ref 3.5–5.1)
ALP BLD-CCNC: 91 U/L (ref 38–126)
ALT SERPL-CCNC: 13 U/L (ref 11–66)
ANION GAP SERPL CALCULATED.3IONS-SCNC: 10 MEQ/L (ref 8–16)
ANION GAP SERPL CALCULATED.3IONS-SCNC: 14 MEQ/L (ref 8–16)
ANISOCYTOSIS: ABNORMAL
AST SERPL-CCNC: 21 U/L (ref 5–40)
BACTERIA: ABNORMAL /HPF
BASOPHILS # BLD: 0.7 %
BASOPHILS ABSOLUTE: 0 THOU/MM3 (ref 0–0.1)
BILIRUB SERPL-MCNC: 0.2 MG/DL (ref 0.3–1.2)
BILIRUBIN DIRECT: < 0.2 MG/DL (ref 0–0.3)
BILIRUBIN URINE: ABNORMAL
BLOOD, URINE: ABNORMAL
BUN BLDV-MCNC: 14 MG/DL (ref 7–22)
BUN BLDV-MCNC: 18 MG/DL (ref 7–22)
CALCIUM SERPL-MCNC: 8.3 MG/DL (ref 8.5–10.5)
CALCIUM SERPL-MCNC: 8.6 MG/DL (ref 8.5–10.5)
CASTS 2: ABNORMAL /LPF
CASTS UA: ABNORMAL /LPF
CHARACTER, URINE: ABNORMAL
CHLORIDE BLD-SCNC: 103 MEQ/L (ref 98–111)
CHLORIDE BLD-SCNC: 105 MEQ/L (ref 98–111)
CO2: 19 MEQ/L (ref 23–33)
CO2: 21 MEQ/L (ref 23–33)
COLOR: ABNORMAL
CREAT SERPL-MCNC: 0.9 MG/DL (ref 0.4–1.2)
CREAT SERPL-MCNC: 0.9 MG/DL (ref 0.4–1.2)
CRYSTALS, UA: ABNORMAL
EOSINOPHIL # BLD: 4.9 %
EOSINOPHILS ABSOLUTE: 0.3 THOU/MM3 (ref 0–0.4)
EPITHELIAL CELLS, UA: ABNORMAL /HPF
GFR SERPL CREATININE-BSD FRML MDRD: 61 ML/MIN/1.73M2
GFR SERPL CREATININE-BSD FRML MDRD: 61 ML/MIN/1.73M2
GLUCOSE BLD-MCNC: 123 MG/DL (ref 70–108)
GLUCOSE BLD-MCNC: 96 MG/DL (ref 70–108)
GLUCOSE URINE: NEGATIVE MG/DL
HCT VFR BLD CALC: 32.8 % (ref 37–47)
HEMOGLOBIN: 11.1 GM/DL (ref 12–16)
ICTOTEST: NEGATIVE
KETONES, URINE: ABNORMAL
LEUKOCYTE ESTERASE, URINE: ABNORMAL
LYMPHOCYTES # BLD: 19.7 %
LYMPHOCYTES ABSOLUTE: 1.3 THOU/MM3 (ref 1–4.8)
MAGNESIUM: 2.3 MG/DL (ref 1.6–2.4)
MCH RBC QN AUTO: 29.2 PG (ref 27–31)
MCHC RBC AUTO-ENTMCNC: 33.8 GM/DL (ref 33–37)
MCV RBC AUTO: 86.5 FL (ref 81–99)
MISCELLANEOUS 2: ABNORMAL
MONOCYTES # BLD: 11.5 %
MONOCYTES ABSOLUTE: 0.7 THOU/MM3 (ref 0.4–1.3)
NITRITE, URINE: NEGATIVE
NUCLEATED RED BLOOD CELLS: 0 /100 WBC
PDW BLD-RTO: 16.9 % (ref 11.5–14.5)
PH UA: 5.5
PLATELET # BLD: 259 THOU/MM3 (ref 130–400)
PMV BLD AUTO: 7.3 MCM (ref 7.4–10.4)
POTASSIUM SERPL-SCNC: 3.9 MEQ/L (ref 3.5–5.2)
POTASSIUM SERPL-SCNC: 4.3 MEQ/L (ref 3.5–5.2)
PROTEIN UA: 30
RBC # BLD: 3.8 MILL/MM3 (ref 4.2–5.4)
RBC # BLD: NORMAL 10*6/UL
RBC URINE: ABNORMAL /HPF
RENAL EPITHELIAL, UA: ABNORMAL
SEG NEUTROPHILS: 63.2 %
SEGMENTED NEUTROPHILS ABSOLUTE COUNT: 4 THOU/MM3 (ref 1.8–7.7)
SODIUM BLD-SCNC: 136 MEQ/L (ref 135–145)
SODIUM BLD-SCNC: 136 MEQ/L (ref 135–145)
SPECIFIC GRAVITY, URINE: 1.03 (ref 1–1.03)
TOTAL PROTEIN: 5.9 G/DL (ref 6.1–8)
UROBILINOGEN, URINE: 1 EU/DL
WBC # BLD: 6.4 THOU/MM3 (ref 4.8–10.8)
WBC UA: ABNORMAL /HPF
YEAST: ABNORMAL

## 2017-09-27 PROCEDURE — 82248 BILIRUBIN DIRECT: CPT

## 2017-09-27 PROCEDURE — A4421 OSTOMY SUPPLY MISC: HCPCS

## 2017-09-27 PROCEDURE — 80048 BASIC METABOLIC PNL TOTAL CA: CPT

## 2017-09-27 PROCEDURE — 81001 URINALYSIS AUTO W/SCOPE: CPT

## 2017-09-27 PROCEDURE — 97110 THERAPEUTIC EXERCISES: CPT

## 2017-09-27 PROCEDURE — 6370000000 HC RX 637 (ALT 250 FOR IP): Performed by: PHYSICIAN ASSISTANT

## 2017-09-27 PROCEDURE — 80053 COMPREHEN METABOLIC PANEL: CPT

## 2017-09-27 PROCEDURE — 51702 INSERT TEMP BLADDER CATH: CPT

## 2017-09-27 PROCEDURE — 85025 COMPLETE CBC W/AUTO DIFF WBC: CPT

## 2017-09-27 PROCEDURE — 99232 SBSQ HOSP IP/OBS MODERATE 35: CPT | Performed by: PHYSICIAN ASSISTANT

## 2017-09-27 PROCEDURE — 6370000000 HC RX 637 (ALT 250 FOR IP): Performed by: INTERNAL MEDICINE

## 2017-09-27 PROCEDURE — 51798 US URINE CAPACITY MEASURE: CPT

## 2017-09-27 PROCEDURE — 97530 THERAPEUTIC ACTIVITIES: CPT

## 2017-09-27 PROCEDURE — 1200000003 HC TELEMETRY R&B

## 2017-09-27 PROCEDURE — 87086 URINE CULTURE/COLONY COUNT: CPT

## 2017-09-27 PROCEDURE — 92610 EVALUATE SWALLOWING FUNCTION: CPT

## 2017-09-27 PROCEDURE — 36415 COLL VENOUS BLD VENIPUNCTURE: CPT

## 2017-09-27 PROCEDURE — 6360000002 HC RX W HCPCS: Performed by: INTERNAL MEDICINE

## 2017-09-27 PROCEDURE — 83735 ASSAY OF MAGNESIUM: CPT

## 2017-09-27 RX ADMIN — ARIPIPRAZOLE 2 MG: 2 TABLET ORAL at 08:59

## 2017-09-27 RX ADMIN — DRONABINOL 2.5 MG: 2.5 CAPSULE ORAL at 08:59

## 2017-09-27 RX ADMIN — ASPIRIN 81 MG 81 MG: 81 TABLET ORAL at 21:14

## 2017-09-27 RX ADMIN — PANTOPRAZOLE SODIUM 40 MG: 40 TABLET, DELAYED RELEASE ORAL at 06:41

## 2017-09-27 RX ADMIN — VENLAFAXINE HYDROCHLORIDE 75 MG: 75 CAPSULE, EXTENDED RELEASE ORAL at 21:27

## 2017-09-27 RX ADMIN — DONEPEZIL HYDROCHLORIDE 20 MG: 10 TABLET, FILM COATED ORAL at 08:59

## 2017-09-27 RX ADMIN — ARIPIPRAZOLE 2 MG: 2 TABLET ORAL at 21:15

## 2017-09-27 RX ADMIN — ALPRAZOLAM 0.5 MG: 0.5 TABLET ORAL at 11:23

## 2017-09-27 RX ADMIN — ENOXAPARIN SODIUM 40 MG: 40 INJECTION SUBCUTANEOUS at 08:59

## 2017-09-27 RX ADMIN — HYDRALAZINE HYDROCHLORIDE 50 MG: 50 TABLET, FILM COATED ORAL at 05:53

## 2017-09-27 RX ADMIN — VENLAFAXINE HYDROCHLORIDE 75 MG: 75 CAPSULE, EXTENDED RELEASE ORAL at 08:58

## 2017-09-27 RX ADMIN — HYDRALAZINE HYDROCHLORIDE 50 MG: 50 TABLET, FILM COATED ORAL at 16:10

## 2017-09-27 ASSESSMENT — PAIN DESCRIPTION - FREQUENCY: FREQUENCY: CONTINUOUS

## 2017-09-27 ASSESSMENT — PAIN SCALES - WONG BAKER: WONGBAKER_NUMERICALRESPONSE: 2

## 2017-09-27 ASSESSMENT — PAIN DESCRIPTION - PAIN TYPE: TYPE: CHRONIC PAIN

## 2017-09-27 ASSESSMENT — PAIN DESCRIPTION - ORIENTATION: ORIENTATION: LEFT

## 2017-09-27 ASSESSMENT — PAIN DESCRIPTION - DESCRIPTORS: DESCRIPTORS: SORE

## 2017-09-27 ASSESSMENT — PAIN DESCRIPTION - LOCATION: LOCATION: ARM

## 2017-09-27 ASSESSMENT — PAIN DESCRIPTION - PROGRESSION: CLINICAL_PROGRESSION: NOT CHANGED

## 2017-09-27 ASSESSMENT — PAIN SCALES - GENERAL
PAINLEVEL_OUTOF10: 0
PAINLEVEL_OUTOF10: 0

## 2017-09-27 NOTE — PROGRESS NOTES
[facility] 59  INPATIENT OCCUPATIONAL THERAPY  [facility] MED SURG 8B  DAILY NOTE    Time:  Time In: 1348  Time Out: 1125  Timed Code Treatment Minutes: 40 Minutes  Minutes: 40          Date: 2017  Patient Name: Nate Garcia,   Gender: female      Room: 8B-23/023-A  MRN: [id number]  : 1940  (65 y.o.)  Referring Practitioner: Dina Bean PA-C  Diagnosis: Chest Pain  Diagnosis: Chest pain  Additional Pertinent Hx: Admitted with urinary retention, LE weakness, SOB and poor appetite. CTA of the chest completed in ED showing mild groundglass infiltrate in both lung fields, pna right posterior costophrenic sulcus. She was started on IV antibiotics for possible aspiration pneumonia. Patient has a history of dementia and family reported increasing agitated behavior at home. Restrictions/Precautions:  Restrictions/Precautions: Fall Risk, General Precautions            Position Activity Restriction  Other position/activity restrictions: Head of bed at or above 30 degrees secondary to having a hiatus hernia and at rist for aspiration pneumonia. Past Medical History:   Diagnosis Date    Aspiration pneumonia (HCC)  bilateral  from large hiatus hernia 2017    Dementia     Depression     GERD (gastroesophageal reflux disease)     Hyperlipidemia     Hypertension      Past Surgical History:   Procedure Laterality Date    CHOLECYSTECTOMY      COLONOSCOPY      UPPER GASTROINTESTINAL ENDOSCOPY             Subjective  Chart Reviewed: Yes  Patient assessed for rehabilitation services?: Yes  Response to previous treatment: Patient with no complaints from previous session  Family / Caregiver Present: Yes (Daughter present and supportive)    Subjective: RN okayed OT session. Pt agreed to try getting up with encouragement. Comments: Pt was supine with head of bed elevated. She agreed to get up to the chair. Pt C/O feeling cold. She only reported soreness in her LUE.   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 Pt C/O itchy back and shins. Lotion was applied by both daughter and therapist on those areas. Overall Orientation Status: Impaired  Orientation Level: Oriented to person;Disoriented to place; Disoriented to time;Disoriented to situation         Pain:  Pain Assessment  Patient Currently in Pain: Yes  Pain Assessment: Faces  Simmons-Baker Pain Rating: Hurts a little bit  Pain Type: Chronic pain  Pain Location: Arm  Pain Orientation: Left  Pain Descriptors: Sore  Pain Frequency: Continuous  Clinical Progression: Not changed  Patient's Stated Pain Goal: No pain  Pain Intervention(s): Rest;Repositioned  Response to Pain Intervention: Patient Satisfied  Multiple Pain Sites: No       Objective  Overall Cognitive Status: Exceptions  Following Commands: Inconsistently follows commands  Memory: Decreased short term memory  Safety Judgement: Decreased awareness of need for assistance;Decreased awareness of need for safety  Problem Solving: Assistance required to generate solutions;Assistance required to implement solutions;Assistance required to correct errors made;Assistance required to identify errors made;Decreased awareness of errors  Insights: Not aware of deficits  Initiation: Requires cues for all  Sequencing: Requires cues for all                                                         Coordination  Fine Motor: Pt did not demonstrate functional tasks.   She was holding onto her hospital gown with her L hand and needed cues to relax her  so that she could cover her legs with the gown more easily         ADL  LE Dressing: Maximum assistance (help provided for straightening her slipper socks; she refused to doff them and wear her slippers at this time.  )          Bed mobility  Supine to Sit: Minimal assistance (cues needed to initiate with difficulty noted to move her legs at first)  Scooting: Maximal assistance (helped pt bring her hips forward)    Transfers  Sit to Strengthening, Cognitive Reorientation  Plan Comment: Pt would benefit from continued OT when discharged. Plan is to go to [employer]. Specific instructions for Next Treatment: Functional mobility; ADLs and endurance training; upper body exercises with relaxed breathing; reorientation to situation and her goal before returning home    Goals:  Patient goals : Pt was unable to state. \"She needs to be able to get up to walk and use the bathroom and feed herself. \" per pt's family member. Short term goals  Time Frame for Short term goals: 2-4 tx  Short term goal 1: Pt will demonstrate functional mobility walking to/from the bathroom with hand held assist x 1 to prepare for doing toileting routine more independently. Short term goal 2: Pt will demonstrate transfers to/from various surfaces including toilet seat with armrests with SBA to increase her independence with toileting. Short term goal 3: Pt will feed herself with setup A and verbal cues if needed to increase her ability to have good nutrition and take her medications while at home. Short term goal 4: Pt will complete BUE ROM exercises with demonstration provided for slow breathing to increase her endurance for ease of bathing or doing other ADL tasks. Long term goals  Time Frame for Long term goals : None secondary to short estimated length of stay.

## 2017-09-27 NOTE — PLAN OF CARE
Problem: Discharge Planning:  Goal: Discharged to appropriate level of care  Discharged to appropriate level of care   Outcome: Ongoing  Patient plans to return home at discharge, rehab and nursing home care have been discussed. Patient following plan of care to be discharged at appropriate length of stay. Problem: Cardiac Output - Decreased:  Goal: Hemodynamic stability will improve  Hemodynamic stability will improve   Outcome: Ongoing  Pt blood pressure and vitals being monitored frequently, lab work being addressed with physician if not within normal limits or critical.        Problem: Tissue Perfusion - Cardiopulmonary, Altered:  Goal: Absence of angina  Absence of angina   Outcome: Ongoing  Patient displays no intermittent angina during shift. Problem: Safety:  Goal: Ability to chew and swallow food without choking will improve  Ability to chew and swallow food without choking will improve   Outcome: Ongoing  Pt able to swallow water and oral medications without signs of choking, positioned to avoid aspiration        Problem: Nutrition  Goal: Optimal nutrition therapy  Outcome: Ongoing  Pt drank 2/3 of nutrition supplement, encouraging to eat more of dinner    Problem: Falls - Risk of:  Goal: Will remain free from falls  Will remain free from falls   Outcome: Ongoing  Patient absent of falls this shift. RN visually checks on patient hourly with rounds. Patient was educated on how to use call light to get out of bed to reduce the risk of falls. Call light within reach, bed in lowest position. Problem: Pain:  Goal: Pain level will decrease  Pain level will decrease   Outcome: Ongoing  Patient has voiced no pain this shift. Rn continues to assess pt pain with checks. Comments:   Care plan reviewed with patient and . Patient and  verbalize understanding of the plan of care and contribute to goal setting.

## 2017-09-27 NOTE — PROCEDURES
A Bladder scan was performed at [facility] . The patient's last void was at incontinent 09/26/17 in the a.m. The residual amount was measured to be 450 ML. Report of results was given to [facility].

## 2017-09-27 NOTE — PROGRESS NOTES
Hospitalist Progress Note    Patient:  Chelsey Rabago      Unit/Bed:8B-23/023-A    YOB: 1940    MRN: [id number]       Acct: [de-identified]     PCP: Gunnar Holt DO    Date of Admission: 9/22/2017    Chief Complaint: shortness of breath, poor appetite, urinary retention, weakness in LE    Hospital Course: This patient was admitted to [facility] on 9/22/17 for chief complaints of SOB, poor appetite, urinary retention, weakness in LE. She has a history of dementia, per family getting more agitated at home. She had a CTA of chest in ED: mild groundglass infiltrate in both lung fields, pneumonia/atelectasis right posterior costophrenic sulcus. She was started in IV antibiotics for concern of possible aspiration pneumonia, with fever and leukocytosis. Psychiatry consulted and resumed on home medications at appropriate intervals with success. Due to lower extremity weakness, CT of Lspine on 9/22 in ED showed no acute fractures, subluxations, but chronic degenerative changes of L4-S1 with right foraminal stenosis. Nowak catheter was placed due to urinary retention, which was removed on 9/25 and pataient voiding. On 9/25, patient had a fall in the bathroom and sustained no injuries. On 9/26: Patient doing well, family decided SNF at discharge for rehab.  assisting with discharge planning. Subjective: Patient is resting in bed, her  Noah Harris is at the bedside. Patient reports no pain and offers no complaints. She is pleasant this am. Denies fever, chills, shortness of breath, chest pain, abdominal pain, nausea, vomiting, bowel changes.  Nowak placed overnight due to retention of >485 mL       Medications:  Reviewed    Infusion Medications    Scheduled Medications    potassium replacement protocol   Other RX Placeholder    influenza virus vaccine  0.5 mL Intramuscular Once    donepezil  20 mg Oral Daily    ARIPiprazole  2 mg Oral BID    hydrALAZINE  50 mg Oral BID    venlafaxine  75 mg report electronically signed by Dr. Paty Hardin on 9/25/2017 6:36 AM      CT LUMBAR RECONSTRUCTION WO POST PROCESS   Final Result   1. No acute fractures or acute subluxations. 2.  Discogenic and hypertrophic osteoarthritic/degenerative changes as described level by level mostly affecting the lower lumbar spine. These have a chronic appearance. **This report has been created using voice recognition software. It may contain minor errors which are inherent in voice recognition technology. **      Final report electronically signed by Dr. Fili Gould on 9/22/2017 10:18 PM      CT THORACIC RECONSTRUCTION WO POST PROCESS   Final Result   1. No acute fractures or acute subluxations. 2.  Minimal degenerative changes. 3.  No obvious discogenic acute processes or disc bulges. CT is less sensitive than MRI for disc pathology. **This report has been created using voice recognition software. It may contain minor errors which are inherent in voice recognition technology. **      Final report electronically signed by Dr. Fili Gould on 9/22/2017 10:15 PM      CT ABDOMEN PELVIS W IV CONTRAST Additional Contrast? None   Final Result   1. Very large fluid-filled hiatal hernia. The intra-abdominal portion of the stomach is distended with a large gas fluid level. 2.  No bowel obstruction. 3.  Increase in the size of the left ovarian cyst. In the postmenopausal patient systems this side is almost certainly benign but should be followed annually with ultrasound. 4.  Stable small likely simple cyst in the left kidney inferiorly. 5.  Moderate diverticulosis of the colon which looks unchanged and shows no acute process associated with it. **This report has been created using voice recognition software. It may contain minor errors which are inherent in voice recognition technology. **      Final report electronically signed by Dr. Fili Gould on 9/22/2017 5:12 PM      CTA CHEST W Ryan German CONTRAST   Final Result   1. No pulmonary emboli are seen. . Questionable pulmonary arterial hypertension. 2. Large hiatus hernia. Moderately severe distention of the stomach with fluid and air. 3. Mild infiltrates in both lungs. See comments above. **This report has been created using voice recognition software. It may contain minor errors which are inherent in voice recognition technology. **          Final report electronically signed by Dr. Javi Barrios on 9/22/2017 4:58 PM      XR Chest Portable   Final Result   Large fixed hiatal hernia. No definite acute findings. **This report has been created using voice recognition software. It may contain minor errors which are inherent in voice recognition technology. **      Final report electronically signed by Dr. Shannan Osborne on 9/22/2017 12:53 PM          Diet: DIET GENERAL;  Dietary Nutrition Supplements: Standard High Calorie Oral Supplement    DVT prophylaxis: [x] Lovenox                                 [] SCDs                                 [] SQ Heparin                                 [] Encourage ambulation           [] Already on Anticoagulation     Disposition:    [] Home       [] TCU       [] Rehab       [] Psych       [x] SNF       [] [facility]       [] Other-    Code Status: DNR-CCA    PT/OT Eval Status:  active and ongoing      Assessment/Plan:    Anticipated Discharge in : pending ECF placement    Active Hospital Problems    Diagnosis Date Noted    Hiatus hernia with compression intrathoracic [K44.0] 09/22/2017     Priority: High     Class: Acute    Chest pain [R07.9] 09/22/2017     Priority: High     Class: Acute    Spinal stenosis at L4-L5 level  with left leg weakness and urinary retention [M48.06] 09/22/2017     Priority: High     Class: Acute    Weakness of left lower extremity  spinal stenosis  or cva [R29.898] 09/22/2017     Priority: High     Class: Acute    Urinary retention  neurogenic bladder [R33.9] 09/22/2017     Priority: High     Class: Chronic    Alzheimer's dementia with behavioral disturbance [G30.8, F02.81] 09/22/2017     Priority: High     Class: Acute    Aspiration pneumonia ([address]  bilateral  from large hiatus hernia [J69.0] 09/22/2017     Priority: High     Class: Acute    Fever [R50.9]     Dehydration [E86.0]        1. Possible Aspiration Pneumonia (POA)--resolved. CTA of chest on 9/22: mild atelectasis/pna right posterior costohrenic sulcus. Initially febrile with leukocytosis. Given IV Azithromycin, IV Levaquin in ED. Procalcitonin 0.07, 0.06, 0.05. Repeat CXR on 9/25: (-) acute process. Treated IV Zosyn 9/23-9/24.   2. SIRS (POA)--resolved. On presentation WBC 15.0, febrile tmax 100.9. Flu A/B (-), Blood cultures no growth. U/A (-), CXR/CTA as in #1. Procalcitonin 0.07, 0.06, 0.05, lactic acid 1.0. Afebrile, WBC 6.2.   3. Large Hiatal Hernia--noted on CT. Continue PPI, sitting up after eating for at least 45 minutes. Discussed this with the patient's  this am.   4. Chest Pain--resolved. Possibly musculoskeletal from coughing. Troponin (-)x3, BNP 1048.0, CK 82. CTA (-) for PE, aneurysm, dissection. No chest pain. 5. KUMAR--resolved. Mild, Cr 1.3 on 9/24, felt to be due to dehydration. Cr 0.9.   6. Urinary Retention--russell placed overnight after bladder scan showed >485 mL. Discussed with the patient,  and daughter patient may have to be discharged to SNF with russell and have bladder retraining/urology follow. Questions answered. 7. Hypokalemia--resolved  8. Hyponatremia--resolved  9. Lumbar Degenerative Changes--chronic changes noted on CT of Lspine-moderate-severe right foraminal stenosis, L5-S1 loss of disc heigh. Continue PT/OT. 10. Alzheimer's Dementia--appreciate psychiatry input, continue home meds as scheduled. PT/OT. Dispo:  and case management assisting in discharge planning.  Family to make a decision on ECF today to initiate pre-cert, possibly [facility]. Will follow.       I have discussed this patient's care and plan with Dr. Jordan Sigala   Electronically signed by Travis Rivers PA-C on 9/27/2017 at 7:10 AM

## 2017-09-27 NOTE — PROGRESS NOTES
[facility]  PHYSICAL THERAPY MISSED TREATMENT NOTE  ACUTE CARE  [facility] MED SURG 8B              Missed Treatment  Hold treatment this morning per RN as pt just finishing OT at time of attempt. Will attempt again later if time allows.

## 2017-09-27 NOTE — PROGRESS NOTES
[address] [address]  SPEECH THERAPY  [facility] MED SURG 8B  Bedside Swallowing Evaluation      SLP Individual Minutes  Time In: 730  Time Out: 6332  Minutes: 11          Date: 2017  Patient Name: Sherryle Brochure      CSN: [id number]   : 1940  (65 y.o.)  Gender: female   Referring Physician:  Chema Gomez PA-C  Diagnosis: increased agitation   Secondary Diagnosis:  Dysphagia   History of Present Illness/Injury: Pt admit with the above diagnosis. Concern for aspiration given poor mentation. Initial BSE completed on  with limited po trials due to poor mentation. Repeat BSE this date as pt with improved mental status. Past Medical History:   Diagnosis Date    Aspiration pneumonia (HCC)  bilateral  from large hiatus hernia 2017    Dementia     Depression     GERD (gastroesophageal reflux disease)     Hyperlipidemia     Hypertension        Pain:  0/10    Current Diet: Regular and thin    Respiratory Status: [x] Independent [] Nasal Cannula [] Oxygen Mask      [] Tracheostomy [] Other:     [] Ventilator/Settings:    Behavioral Observation: [x] Alert  and pleasantly confused. Spouse present  ORAL MECHANISM EVALUATION:         Comments:  Facial / Labial [x]WFL [] Impaired []DNT    Lingual [x]WFL [] Impaired []DNT    Dentition [x]WFL [] Impaired []DNT    Velum [x]WFL [] Impaired []DNT    Vocal Quality [x]WFL [] Impaired []DNT    Sensation [x]WFL [] Impaired []DNT    Cough [x]WFL [] Impaired []DNT    Other: []WFL [] Impaired []DNT    Other: []WFL [] Impaired []DNT        PATIENT WAS EVALUATED USING:  Thin by straw and bread and butter    ORAL PHASE: [x] WFL     PHARYNGEAL PHASE: [x] WFL: Pharyngeal phase appears WFLs, but cannot completely rule out pharyngeal phase deficits from a bedside swallow evaluation alone.  Very limited assessment     SIGNS AND SYMPTOMS OF LARYNGEAL PENETRATION / ASPIRATION:  [x] NO sign/symptoms of aspiration evident with this evaluation, but cannot rule out silent

## 2017-09-27 NOTE — PLAN OF CARE
Problem: Discharge Planning:  Goal: Discharged to appropriate level of care  Discharged to appropriate level of care   Outcome: Ongoing  Family working with  on nursing home placement    Problem: Safety:  Goal: Ability to chew and swallow food without choking will improve  Ability to chew and swallow food without choking will improve   Outcome: Ongoing  Pt head of bed elevated    Problem: Falls - Risk of:  Goal: Will remain free from falls  Will remain free from falls   Outcome: Ongoing  Pt bed alarm in place    Problem: Pain:  Goal: Pain level will decrease  Pain level will decrease   Outcome: Ongoing  Pt pain free at this time    Comments:   Care plan reviewed with family. Family verbalize understanding of the plan of care and contribute to goal setting.

## 2017-09-28 LAB
ANION GAP SERPL CALCULATED.3IONS-SCNC: 16 MEQ/L (ref 8–16)
ANISOCYTOSIS: ABNORMAL
BASOPHILS # BLD: 0.5 %
BASOPHILS ABSOLUTE: 0 THOU/MM3 (ref 0–0.1)
BLOOD CULTURE, ROUTINE: NORMAL
BLOOD CULTURE, ROUTINE: NORMAL
BUN BLDV-MCNC: 17 MG/DL (ref 7–22)
CALCIUM SERPL-MCNC: 8.8 MG/DL (ref 8.5–10.5)
CHLORIDE BLD-SCNC: 102 MEQ/L (ref 98–111)
CO2: 23 MEQ/L (ref 23–33)
CREAT SERPL-MCNC: 0.8 MG/DL (ref 0.4–1.2)
EOSINOPHIL # BLD: 0.9 %
EOSINOPHILS ABSOLUTE: 0.1 THOU/MM3 (ref 0–0.4)
GFR SERPL CREATININE-BSD FRML MDRD: 69 ML/MIN/1.73M2
GLUCOSE BLD-MCNC: 121 MG/DL (ref 70–108)
HCT VFR BLD CALC: 34.3 % (ref 37–47)
HEMOGLOBIN: 11.8 GM/DL (ref 12–16)
LYMPHOCYTES # BLD: 11.6 %
LYMPHOCYTES ABSOLUTE: 0.8 THOU/MM3 (ref 1–4.8)
MCH RBC QN AUTO: 29.2 PG (ref 27–31)
MCHC RBC AUTO-ENTMCNC: 34.5 GM/DL (ref 33–37)
MCV RBC AUTO: 84.6 FL (ref 81–99)
MONOCYTES # BLD: 9 %
MONOCYTES ABSOLUTE: 0.7 THOU/MM3 (ref 0.4–1.3)
NUCLEATED RED BLOOD CELLS: 0 /100 WBC
ORGANISM: ABNORMAL
PDW BLD-RTO: 16.9 % (ref 11.5–14.5)
PLATELET # BLD: 275 THOU/MM3 (ref 130–400)
PMV BLD AUTO: 7.4 MCM (ref 7.4–10.4)
POTASSIUM SERPL-SCNC: 4 MEQ/L (ref 3.5–5.2)
RBC # BLD: 4.06 MILL/MM3 (ref 4.2–5.4)
RBC # BLD: NORMAL 10*6/UL
SEG NEUTROPHILS: 78 %
SEGMENTED NEUTROPHILS ABSOLUTE COUNT: 5.7 THOU/MM3 (ref 1.8–7.7)
SODIUM BLD-SCNC: 141 MEQ/L (ref 135–145)
URINE CULTURE REFLEX: ABNORMAL
WBC # BLD: 7.3 THOU/MM3 (ref 4.8–10.8)

## 2017-09-28 PROCEDURE — 80048 BASIC METABOLIC PNL TOTAL CA: CPT

## 2017-09-28 PROCEDURE — 6370000000 HC RX 637 (ALT 250 FOR IP): Performed by: INTERNAL MEDICINE

## 2017-09-28 PROCEDURE — 36415 COLL VENOUS BLD VENIPUNCTURE: CPT

## 2017-09-28 PROCEDURE — 99232 SBSQ HOSP IP/OBS MODERATE 35: CPT | Performed by: PHYSICIAN ASSISTANT

## 2017-09-28 PROCEDURE — 6370000000 HC RX 637 (ALT 250 FOR IP): Performed by: PHYSICIAN ASSISTANT

## 2017-09-28 PROCEDURE — 1200000003 HC TELEMETRY R&B

## 2017-09-28 PROCEDURE — 97530 THERAPEUTIC ACTIVITIES: CPT

## 2017-09-28 PROCEDURE — 6360000002 HC RX W HCPCS: Performed by: INTERNAL MEDICINE

## 2017-09-28 PROCEDURE — 6360000002 HC RX W HCPCS: Performed by: ANESTHESIOLOGY

## 2017-09-28 PROCEDURE — 85025 COMPLETE CBC W/AUTO DIFF WBC: CPT

## 2017-09-28 PROCEDURE — 02HV33Z INSERTION OF INFUSION DEVICE INTO SUPERIOR VENA CAVA, PERCUTANEOUS APPROACH: ICD-10-PCS | Performed by: ANESTHESIOLOGY

## 2017-09-28 PROCEDURE — 2580000003 HC RX 258: Performed by: ANESTHESIOLOGY

## 2017-09-28 PROCEDURE — 6360000002 HC RX W HCPCS

## 2017-09-28 RX ORDER — ATENOLOL 50 MG/1
50 TABLET ORAL DAILY
Status: DISCONTINUED | OUTPATIENT
Start: 2017-09-28 | End: 2017-09-29

## 2017-09-28 RX ORDER — ZIPRASIDONE MESYLATE 20 MG/ML
20 INJECTION, POWDER, LYOPHILIZED, FOR SOLUTION INTRAMUSCULAR ONCE
Status: COMPLETED | OUTPATIENT
Start: 2017-09-28 | End: 2017-09-28

## 2017-09-28 RX ORDER — HYDRALAZINE HYDROCHLORIDE 20 MG/ML
10 INJECTION INTRAMUSCULAR; INTRAVENOUS EVERY 4 HOURS PRN
Status: DISCONTINUED | OUTPATIENT
Start: 2017-09-28 | End: 2017-10-04 | Stop reason: HOSPADM

## 2017-09-28 RX ORDER — GLYCOPYRROLATE 1 MG/1
1 TABLET ORAL 2 TIMES DAILY
Status: DISCONTINUED | OUTPATIENT
Start: 2017-09-28 | End: 2017-10-02

## 2017-09-28 RX ORDER — ONDANSETRON 2 MG/ML
4 INJECTION INTRAMUSCULAR; INTRAVENOUS EVERY 6 HOURS PRN
Status: DISCONTINUED | OUTPATIENT
Start: 2017-09-28 | End: 2017-10-04 | Stop reason: HOSPADM

## 2017-09-28 RX ORDER — HYDRALAZINE HYDROCHLORIDE 20 MG/ML
INJECTION INTRAMUSCULAR; INTRAVENOUS
Status: COMPLETED
Start: 2017-09-28 | End: 2017-09-28

## 2017-09-28 RX ORDER — SODIUM CHLORIDE 0.9 % (FLUSH) 0.9 %
10 SYRINGE (ML) INJECTION EVERY 12 HOURS SCHEDULED
Status: DISCONTINUED | OUTPATIENT
Start: 2017-09-28 | End: 2017-10-04 | Stop reason: HOSPADM

## 2017-09-28 RX ORDER — LIDOCAINE HYDROCHLORIDE 10 MG/ML
5 INJECTION, SOLUTION EPIDURAL; INFILTRATION; INTRACAUDAL; PERINEURAL ONCE
Status: DISCONTINUED | OUTPATIENT
Start: 2017-09-28 | End: 2017-10-04 | Stop reason: HOSPADM

## 2017-09-28 RX ORDER — SODIUM CHLORIDE 0.9 % (FLUSH) 0.9 %
10 SYRINGE (ML) INJECTION PRN
Status: DISCONTINUED | OUTPATIENT
Start: 2017-09-28 | End: 2017-10-04 | Stop reason: HOSPADM

## 2017-09-28 RX ORDER — BISACODYL 10 MG
10 SUPPOSITORY, RECTAL RECTAL DAILY PRN
Status: DISCONTINUED | OUTPATIENT
Start: 2017-09-28 | End: 2017-10-04 | Stop reason: HOSPADM

## 2017-09-28 RX ORDER — HYDRALAZINE HYDROCHLORIDE 20 MG/ML
10 INJECTION INTRAMUSCULAR; INTRAVENOUS EVERY 4 HOURS PRN
Status: DISCONTINUED | OUTPATIENT
Start: 2017-09-28 | End: 2017-09-28

## 2017-09-28 RX ADMIN — HYDRALAZINE HYDROCHLORIDE 10 MG: 20 INJECTION INTRAMUSCULAR; INTRAVENOUS at 17:51

## 2017-09-28 RX ADMIN — ENOXAPARIN SODIUM 40 MG: 40 INJECTION SUBCUTANEOUS at 09:08

## 2017-09-28 RX ADMIN — HYDRALAZINE HYDROCHLORIDE 10 MG: 20 INJECTION INTRAMUSCULAR; INTRAVENOUS at 05:44

## 2017-09-28 RX ADMIN — HYDRALAZINE HYDROCHLORIDE 10 MG: 20 INJECTION INTRAMUSCULAR; INTRAVENOUS at 12:49

## 2017-09-28 RX ADMIN — ONDANSETRON 4 MG: 2 INJECTION INTRAMUSCULAR; INTRAVENOUS at 12:03

## 2017-09-28 RX ADMIN — ARIPIPRAZOLE 2 MG: 2 TABLET ORAL at 21:30

## 2017-09-28 RX ADMIN — ALPRAZOLAM 0.5 MG: 0.5 TABLET ORAL at 22:11

## 2017-09-28 RX ADMIN — LOPERAMIDE HYDROCHLORIDE 2 MG: 2 CAPSULE ORAL at 21:29

## 2017-09-28 RX ADMIN — BISACODYL 10 MG: 10 SUPPOSITORY RECTAL at 22:11

## 2017-09-28 RX ADMIN — WATER 1.2 ML: 1 INJECTION INTRAMUSCULAR; INTRAVENOUS; SUBCUTANEOUS at 05:43

## 2017-09-28 RX ADMIN — ASPIRIN 81 MG 81 MG: 81 TABLET ORAL at 21:29

## 2017-09-28 RX ADMIN — GLYCOPYRROLATE 1 MG: 1 TABLET ORAL at 22:11

## 2017-09-28 RX ADMIN — DRONABINOL 2.5 MG: 2.5 CAPSULE ORAL at 22:11

## 2017-09-28 RX ADMIN — ALPRAZOLAM 0.5 MG: 0.5 TABLET ORAL at 00:10

## 2017-09-28 RX ADMIN — VENLAFAXINE HYDROCHLORIDE 75 MG: 75 CAPSULE, EXTENDED RELEASE ORAL at 21:31

## 2017-09-28 RX ADMIN — ZIPRASIDONE MESYLATE 20 MG: 20 INJECTION, POWDER, LYOPHILIZED, FOR SOLUTION INTRAMUSCULAR at 05:43

## 2017-09-28 ASSESSMENT — PAIN SCALES - GENERAL
PAINLEVEL_OUTOF10: 0

## 2017-09-28 NOTE — PROGRESS NOTES
RN attempted to give patient scheduled hydralazine PO. Pt consumed large amount of water prior to med attempt. Patient spit out medication. Episode of brown emesis followed attempt at med admin. Patient did not take medication.

## 2017-09-28 NOTE — PLAN OF CARE
Problem: Discharge Planning:  Goal: Discharged to appropriate level of care  Discharged to appropriate level of care   Outcome: Ongoing  Patient plans to return nursing home at discharge. Patient following plan of care to be discharged at appropriate length of stay. Problem: Cardiac Output - Decreased:  Goal: Hemodynamic stability will improve  Hemodynamic stability will improve   Outcome: Ongoing  Pt blood pressure and vitals being monitored frequently, lab work being addressed with physician if not within normal limits or critical.        Problem: Tissue Perfusion - Cardiopulmonary, Altered:  Goal: Absence of angina  Absence of angina   Outcome: Ongoing  Pt experienced no Cp this shift, Rn continue to monitor    Problem: Safety:  Goal: Ability to chew and swallow food without choking will improve  Ability to chew and swallow food without choking will improve   Outcome: Ongoing  Rn assessed pt ability to swallow medication, sat upright to do so. HOB kept 30 deg at all times    Problem: Nutrition  Goal: Optimal nutrition therapy  Outcome: Ongoing  Pt taking supplements for nutrition enhancement    Problem: Falls - Risk of:  Goal: Will remain free from falls  Will remain free from falls   Outcome: Ongoing  Patient absent of falls this shift. RN visually checks on patient hourly with rounds. Patient was educated on how to use call light to get out of bed to reduce the risk of falls. Call light within reach, bed in lowest position. Problem: Pain:  Goal: Pain level will decrease  Pain level will decrease   Outcome: Ongoing  Patient has voiced pain this shift 0/10. Patient's goal is 0/10 Rn attempts to complete noninvasive and non prescribed methods to reduce pain. Pain frequently reassessed        Comments:   Care plan reviewed with patient and family. Patient and family verbalize understanding of the plan of care and contribute to goal setting.

## 2017-09-28 NOTE — PROGRESS NOTES
0430 orders from Dr. Teresa Davey for ondansetron, hydralazine and geodon. Wait for pharmacy to verify med until [phone], Pt monitored, pharmacy called.

## 2017-09-28 NOTE — PROGRESS NOTES
Hospitalist Progress Note    Patient:  Alois Councilman      Unit/Bed:8B-23/023-A    YOB: 1940    MRN: [id number]       Acct: [de-identified]     PCP: Fili Collins DO    Date of Admission: 9/22/2017    Chief Complaint: shortness of breath, poor appetite, urinary retention, weakness in LE    Hospital Course: This patient was admitted to [facility] on 9/22/17 for chief complaints of SOB, poor appetite, urinary retention, weakness in LE. She has a history of dementia, per family getting more agitated at home. She had a CTA of chest in ED: mild groundglass infiltrate in both lung fields, pneumonia/atelectasis right posterior costophrenic sulcus. She was started in IV antibiotics including Levaquin (one dose in ED), Azithromycin (one dose in ED), IV Zosyn for concern of possible aspiration pneumonia, with fever and leukocytosis. Psychiatry consulted and resumed on home medications at appropriate intervals with success. Due to lower extremity weakness, CT of Lspine on 9/22 in ED showed no acute fractures, subluxations, but chronic degenerative changes of L4-S1 with right foraminal stenosis. PT/OT consulted. Nowak catheter was placed due to urinary retention, which was removed on 9/25 and patient voiding. On 9/25, patient had a fall in the bathroom and sustained no injuries. On 9/26: Patient doing well, family decided SNF at discharge for rehab.  assisting with discharge planning. 9/27: Nowak catheter placed during bladder scan showing 450 mL. Family deciding ECF placement. Subjective: Patient is resting in bed, her  Sis Hill is at the bedside. Patient is sleepy. She is alert and oriented to self, her . Unable to state where she is. She denies pain. She affirms nausea, but when asked at end of interview she denies nausea. Did have an episode of emesis overnight.        Medications:  Reviewed    Infusion Medications    Scheduled Medications    potassium replacement protocol   Other RX Placeholder    influenza virus vaccine  0.5 mL Intramuscular Once    donepezil  20 mg Oral Daily    ARIPiprazole  2 mg Oral BID    hydrALAZINE  50 mg Oral BID    venlafaxine  75 mg Oral BID    loperamide  2 mg Oral Nightly    aspirin  81 mg Oral Nightly    enoxaparin  40 mg Subcutaneous Daily    dronabinol  2.5 mg Oral BID    pantoprazole  40 mg Oral QAM AC     PRN Meds: ondansetron, hydrALAZINE, ALPRAZolam, ipratropium-albuterol, acetaminophen      Intake/Output Summary (Last 24 hours) at 09/28/17 0635  Last data filed at 09/28/17 0442   Gross per 24 hour   Intake             1510 ml   Output              365 ml   Net             1145 ml       Diet:  DIET GENERAL;  Dietary Nutrition Supplements: Standard High Calorie Oral Supplement    Exam:  BP (!) 212/88  Pulse 79  Temp 97.7 °F (36.5 °C) (Axillary)   Resp 16  Ht 5' 1\" (1.549 m)  Wt 199 lb 2 oz (90.3 kg)  SpO2 97%  BMI 37.62 kg/m2    General appearance: No apparent distress, elderly, confused, sleepy and cooperative. HEENT: Pupils equal, round, and reactive to light. Conjunctivae/corneas clear. Oral mucosa moist  Neck: Supple, with full range of motion. No jugular venous distention. Trachea midline. Respiratory:  Normal respiratory effort. Clear to auscultation, bilaterally without Rales/Wheezes/Rhonchi. On room air, O2 sat 97%. Cardiovascular: Regular rate and rhythm with normal S1/S2 without murmurs, rubs or gallops. no peripheral edema  Abdomen: Soft, non-tender, non-distended with active bowel sounds. No abdominal tenderness with palpation, no guarding or rebound tenderness. Musculoskeletal: No clubbing, cyanosis or edema bilaterally. Full range of motion without deformity. Skin: Skin color, texture, turgor normal.  No rashes or lesions. Neurologic: alert and oriented to self, her  fabiano  Is following commands, responds with conversation  Capillary Refill: Brisk,< 3 seconds   Peripheral Pulses: +2 palpable, equal bilaterally Dr. Sarath Pickering on 9/22/2017 5:12 PM      CTA CHEST W WO CONTRAST   Final Result   1. No pulmonary emboli are seen. . Questionable pulmonary arterial hypertension. 2. Large hiatus hernia. Moderately severe distention of the stomach with fluid and air. 3. Mild infiltrates in both lungs. See comments above. **This report has been created using voice recognition software. It may contain minor errors which are inherent in voice recognition technology. **          Final report electronically signed by Dr. Rayna Qureshi on 9/22/2017 4:58 PM      XR Chest Portable   Final Result   Large fixed hiatal hernia. No definite acute findings. **This report has been created using voice recognition software. It may contain minor errors which are inherent in voice recognition technology. **      Final report electronically signed by Dr. Markus Lockett on 9/22/2017 12:53 PM          Diet: DIET GENERAL;  Dietary Nutrition Supplements: Standard High Calorie Oral Supplement    DVT prophylaxis: [x] Lovenox                                 [] SCDs                                 [] SQ Heparin                                 [] Encourage ambulation           [] Already on Anticoagulation     Disposition:    [] Home       [] TCU       [] Rehab       [] Psych       [x] SNF       [] [facility]       [] Other-    Code Status: DNR-CCA    PT/OT Eval Status:  active and ongoing      Assessment/Plan:    Anticipated Discharge in : pending ECF placement    Active Hospital Problems    Diagnosis Date Noted    Hiatus hernia with compression intrathoracic [K44.0] 09/22/2017     Priority: High     Class: Acute    Chest pain [R07.9] 09/22/2017     Priority: High     Class: Acute    Spinal stenosis at L4-L5 level  with left leg weakness and urinary retention [M48.06] 09/22/2017     Priority: High     Class: Acute    Weakness of left lower extremity  spinal stenosis  or cva [R29.898] 09/22/2017     Priority: Lspine-moderate-severe right foraminal stenosis, L5-S1 loss of disc heigh. Continue PT/OT. 6. Alzheimer's Dementia--appreciate psychiatry input, continue home meds as scheduled. PT/OT. Dispo: Family to make decision on ECF today, to go to [place]. Discussed with Shon Neves, , she will be a pre-cert. Follow precertificiation. Update: discussed with Dr. Abhay Paris, may recommend GI consult if patient continues to have nausea/vomiting.        I have discussed this patient's care and plan with Dr. Abhay Paris   Electronically signed by Armida Laughlin PA-C on 9/28/2017 at 6:35 AM

## 2017-09-28 NOTE — PROGRESS NOTES
1200- Patient had episode of brown emesis after transferring to chair from bed. Given IM zofran per order by this RN. Tolerated well. Eyes closed in bed. Has had very little oral intake.

## 2017-09-28 NOTE — PROGRESS NOTES
degrees secondary to having a hiatus hernia and at [facility]ist for aspiration pneumonia. Subjective:  Chart Reviewed: Yes  Family / Caregiver Present: Yes  Subjective: required encouragement from spouse and PT to get OOB to chair, pt stated \"I don't feel good\"    Pain:  Unable to Assess. Pain Assessment  Pain Level: 0       Social/Functional:  Lives With: Spouse  Type of Home: House  Home Layout: One level  Home Access: Stairs to enter with rails  Entrance Stairs - Number of Steps: 3 steps with a grab handle  Home Equipment: Cane, Rolling walker     Objective:  Supine to Sit: Minimal assistance (x2 from spouse and PT, HOB up)  Scooting: Moderate assistance;Contact guard assistance (x2 CGA from spouse, in sitting fwd to edge of bed, pt dependent to scoot back in chair)    Transfers  Sit to Stand: Minimal Assistance (edge of bed, pt reaching for furniture for BUE support)  Stand to sit: Minimal Assistance       Ambulation 1  Surface: level tile  Device:  (HHA, pt reaching for furniture for BUE support)  Assistance: Minimal assistance (x1)  Quality of Gait: fwd flexed posture, pt impulsive to turn to chair to sit, decreased step length and heelstrike, max cues for safety  Distance: 2'x1         Balance  Comments: pt sat edge of bed with CGA to Saba, pt wanting to just move to chair, cues for safety and to wait for therapist to move chair closer to bed, std with BUE support and Saba with pt very fwd flexed posture and unsteady      Exercises:  Exercises  Comments: none, pt with eyes closed and not following directions for therex          Activity Tolerance:  Activity Tolerance: Patient limited by cognitive status; Patient limited by endurance; Patient limited by fatigue    Assessment:   Body structures, Functions, Activity limitations: Decreased functional mobility , Decreased strength, Decreased safe awareness, Decreased cognition, Decreased balance  Assessment: pt with eyes closed most of session stating \"I don't feel good\"-nursing aware, pt with vomitting after session, pt with generalized weakness and deconditioning, spouse present and supportive, recommend cont PT  Prognosis: Fair  REQUIRES PT FOLLOW UP: Yes  Discharge Recommendations: Continue to assess pending progress, ECF with PT, Patient would benefit from continued therapy after discharge    Patient Education:  Patient Education: POC, importance of OOB to chair    Equipment Recommendations:  Equipment Needed: No  Other: continue to assess -  reports that process in wheelchair was started at another facility and states that he will have them resume and deliver wheelchair to home    Safety:  Type of devices:  All fall risk precautions in place, Gait belt, Patient at risk for falls, Nurse notified, Call light within reach, Left in chair (spouse stated someone from family will be in room with pt at all times)    Plan:  Times per week: 3-5x GM  Specific instructions for Next Treatment: strengthening, sitting and standing balance, gait training, safety at home  Current Treatment Recommendations: Strengthening, Balance Training, Functional Mobility Training, Transfer Training, Endurance Training, Gait Training, Stair training, Home Exercise Program, Safety Education & Training, Equipment Evaluation, Education, & procurement, Patient/Caregiver Education & Training    Goals:  Patient goals : not stated    Short term goals  Time Frame for Short term goals: 1 week  Short term goal 1: patient to perform bed mobility at SBA to get in and out of bed  Short term goal 2: patient to perform transfers from various surfaces at SBA and HHA to rise from bed or chair  Short term goal 3: patient to ambulate 75ft with HHA at [facility] for household mobility  Short term goal 4: patient to negotiate 2 steps with one rail and HHA at [facility] for home access    Long term goals  Time Frame for Long term goals : defer d/t short ELOS    PT G-Codes  Functional Assessment Tool Used: Professional Judgement  Functional Limitation: Mobility: Walking and moving around  Mobility: Walking and Moving Around Current Status (): At least 1 percent but less than 20 percent impaired, limited or restricted  Mobility: Walking and Moving Around Goal Status ():  At least 1 percent but less than 20 percent impaired, limited or restricted

## 2017-09-29 ENCOUNTER — APPOINTMENT (OUTPATIENT)
Dept: GENERAL RADIOLOGY | Age: 77
DRG: 177 | End: 2017-09-29
Payer: MEDICARE

## 2017-09-29 ENCOUNTER — ANESTHESIA EVENT (OUTPATIENT)
Dept: OPERATING ROOM | Age: 77
DRG: 177 | End: 2017-09-29
Payer: MEDICARE

## 2017-09-29 LAB
ANION GAP SERPL CALCULATED.3IONS-SCNC: 12 MEQ/L (ref 8–16)
BUN BLDV-MCNC: 22 MG/DL (ref 7–22)
CALCIUM SERPL-MCNC: 8.4 MG/DL (ref 8.5–10.5)
CHLORIDE BLD-SCNC: 100 MEQ/L (ref 98–111)
CO2: 25 MEQ/L (ref 23–33)
CREAT SERPL-MCNC: 0.9 MG/DL (ref 0.4–1.2)
FERRITIN: 22 NG/ML (ref 10–291)
GFR SERPL CREATININE-BSD FRML MDRD: 61 ML/MIN/1.73M2
GLUCOSE BLD-MCNC: 91 MG/DL (ref 70–108)
HCT VFR BLD CALC: 28.5 % (ref 37–47)
HCT VFR BLD CALC: 29.2 % (ref 37–47)
HEMOCCULT STL QL: POSITIVE
HEMOGLOBIN: 9.6 GM/DL (ref 12–16)
HEMOGLOBIN: 9.8 GM/DL (ref 12–16)
IRON SATURATION: 19 % (ref 20–50)
IRON: 29 UG/DL (ref 50–170)
IRON: 44 UG/DL (ref 50–170)
MCH RBC QN AUTO: 28 PG (ref 27–31)
MCHC RBC AUTO-ENTMCNC: 33.8 GM/DL (ref 33–37)
MCV RBC AUTO: 83.1 FL (ref 81–99)
PDW BLD-RTO: 17.9 % (ref 11.5–14.5)
PLATELET # BLD: 326 THOU/MM3 (ref 130–400)
PMV BLD AUTO: 7.3 MCM (ref 7.4–10.4)
POTASSIUM SERPL-SCNC: 4.1 MEQ/L (ref 3.5–5.2)
RBC # BLD: 3.43 MILL/MM3 (ref 4.2–5.4)
SODIUM BLD-SCNC: 137 MEQ/L (ref 135–145)
TOTAL IRON BINDING CAPACITY: 231 UG/DL (ref 171–450)
WBC # BLD: 8 THOU/MM3 (ref 4.8–10.8)

## 2017-09-29 PROCEDURE — 6370000000 HC RX 637 (ALT 250 FOR IP): Performed by: PHYSICIAN ASSISTANT

## 2017-09-29 PROCEDURE — 6370000000 HC RX 637 (ALT 250 FOR IP): Performed by: INTERNAL MEDICINE

## 2017-09-29 PROCEDURE — 97110 THERAPEUTIC EXERCISES: CPT

## 2017-09-29 PROCEDURE — 1200000003 HC TELEMETRY R&B

## 2017-09-29 PROCEDURE — 74000 XR ABDOMEN LIMITED (KUB): CPT

## 2017-09-29 PROCEDURE — 82728 ASSAY OF FERRITIN: CPT

## 2017-09-29 PROCEDURE — 87507 IADNA-DNA/RNA PROBE TQ 12-25: CPT

## 2017-09-29 PROCEDURE — 85027 COMPLETE CBC AUTOMATED: CPT

## 2017-09-29 PROCEDURE — 83550 IRON BINDING TEST: CPT

## 2017-09-29 PROCEDURE — 82272 OCCULT BLD FECES 1-3 TESTS: CPT

## 2017-09-29 PROCEDURE — 97530 THERAPEUTIC ACTIVITIES: CPT

## 2017-09-29 PROCEDURE — 99233 SBSQ HOSP IP/OBS HIGH 50: CPT | Performed by: NURSE PRACTITIONER

## 2017-09-29 PROCEDURE — 83540 ASSAY OF IRON: CPT

## 2017-09-29 PROCEDURE — 85014 HEMATOCRIT: CPT

## 2017-09-29 PROCEDURE — 80048 BASIC METABOLIC PNL TOTAL CA: CPT

## 2017-09-29 PROCEDURE — 6370000000 HC RX 637 (ALT 250 FOR IP): Performed by: NURSE PRACTITIONER

## 2017-09-29 PROCEDURE — 85018 HEMOGLOBIN: CPT

## 2017-09-29 PROCEDURE — 36415 COLL VENOUS BLD VENIPUNCTURE: CPT

## 2017-09-29 RX ORDER — HYDRALAZINE HYDROCHLORIDE 50 MG/1
50 TABLET, FILM COATED ORAL 2 TIMES DAILY PRN
Status: DISCONTINUED | OUTPATIENT
Start: 2017-09-29 | End: 2017-10-04 | Stop reason: HOSPADM

## 2017-09-29 RX ORDER — SUCRALFATE ORAL 1 G/10ML
1 SUSPENSION ORAL EVERY 6 HOURS SCHEDULED
Status: DISCONTINUED | OUTPATIENT
Start: 2017-09-29 | End: 2017-10-04 | Stop reason: HOSPADM

## 2017-09-29 RX ORDER — 0.9 % SODIUM CHLORIDE 0.9 %
10 VIAL (ML) INJECTION PRN
Status: DISCONTINUED | OUTPATIENT
Start: 2017-09-29 | End: 2017-10-04 | Stop reason: HOSPADM

## 2017-09-29 RX ORDER — ATENOLOL 100 MG/1
100 TABLET ORAL DAILY
Status: DISCONTINUED | OUTPATIENT
Start: 2017-09-29 | End: 2017-10-01

## 2017-09-29 RX ORDER — 0.9 % SODIUM CHLORIDE 0.9 %
10 VIAL (ML) INJECTION EVERY 12 HOURS SCHEDULED
Status: DISCONTINUED | OUTPATIENT
Start: 2017-09-29 | End: 2017-10-04 | Stop reason: HOSPADM

## 2017-09-29 RX ADMIN — SUCRALFATE 1 G: 1 SUSPENSION ORAL at 15:32

## 2017-09-29 RX ADMIN — GLYCOPYRROLATE 1 MG: 1 TABLET ORAL at 21:44

## 2017-09-29 RX ADMIN — PANTOPRAZOLE SODIUM 40 MG: 40 TABLET, DELAYED RELEASE ORAL at 06:16

## 2017-09-29 RX ADMIN — LOPERAMIDE HYDROCHLORIDE 2 MG: 2 CAPSULE ORAL at 21:43

## 2017-09-29 RX ADMIN — SUCRALFATE 1 G: 1 SUSPENSION ORAL at 21:42

## 2017-09-29 RX ADMIN — VENLAFAXINE HYDROCHLORIDE 75 MG: 75 CAPSULE, EXTENDED RELEASE ORAL at 21:53

## 2017-09-29 RX ADMIN — ARIPIPRAZOLE 2 MG: 2 TABLET ORAL at 10:12

## 2017-09-29 RX ADMIN — ARIPIPRAZOLE 2 MG: 2 TABLET ORAL at 21:44

## 2017-09-29 RX ADMIN — DONEPEZIL HYDROCHLORIDE 20 MG: 10 TABLET, FILM COATED ORAL at 10:14

## 2017-09-29 RX ADMIN — GLYCOPYRROLATE 1 MG: 1 TABLET ORAL at 10:11

## 2017-09-29 RX ADMIN — ATENOLOL 100 MG: 100 TABLET ORAL at 10:12

## 2017-09-29 RX ADMIN — ASPIRIN 81 MG 81 MG: 81 TABLET ORAL at 21:43

## 2017-09-29 RX ADMIN — VENLAFAXINE HYDROCHLORIDE 75 MG: 75 CAPSULE, EXTENDED RELEASE ORAL at 10:11

## 2017-09-29 RX ADMIN — ALPRAZOLAM 0.5 MG: 0.5 TABLET ORAL at 22:13

## 2017-09-29 RX ADMIN — ALPRAZOLAM 0.5 MG: 0.5 TABLET ORAL at 10:22

## 2017-09-29 ASSESSMENT — PAIN SCALES - PAIN ASSESSMENT IN ADVANCED DEMENTIA (PAINAD)
NEGVOCALIZATION: 0
FACIALEXPRESSION: 0
NEGVOCALIZATION: 0
FACIALEXPRESSION: 0
BREATHING: 0
BREATHING: 0
BODYLANGUAGE: 0
FACIALEXPRESSION: 0
BREATHING: 0
TOTALSCORE: 0
NEGVOCALIZATION: 0
CONSOLABILITY: 0
FACIALEXPRESSION: 0
FACIALEXPRESSION: 0
BREATHING: 0
TOTALSCORE: 0
BODYLANGUAGE: 0
TOTALSCORE: 0
FACIALEXPRESSION: 0
CONSOLABILITY: 0
NEGVOCALIZATION: 0
CONSOLABILITY: 0
FACIALEXPRESSION: 0
FACIALEXPRESSION: 0
TOTALSCORE: 0
BODYLANGUAGE: 0
BREATHING: 0
CONSOLABILITY: 0
NEGVOCALIZATION: 0
BREATHING: 0
BODYLANGUAGE: 0
CONSOLABILITY: 0
CONSOLABILITY: 0
TOTALSCORE: 0
NEGVOCALIZATION: 0
TOTALSCORE: 0
CONSOLABILITY: 0
FACIALEXPRESSION: 0
CONSOLABILITY: 0
NEGVOCALIZATION: 0
FACIALEXPRESSION: 0
BODYLANGUAGE: 0
BREATHING: 0
NEGVOCALIZATION: 0
BREATHING: 0
CONSOLABILITY: 0
NEGVOCALIZATION: 0
BODYLANGUAGE: 0
BREATHING: 0
CONSOLABILITY: 0
BREATHING: 0
BREATHING: 0
CONSOLABILITY: 0
BREATHING: 0
TOTALSCORE: 0
BODYLANGUAGE: 0
NEGVOCALIZATION: 0
TOTALSCORE: 0
TOTALSCORE: 0
FACIALEXPRESSION: 0
BODYLANGUAGE: 0
NEGVOCALIZATION: 0
BODYLANGUAGE: 0
CONSOLABILITY: 0
FACIALEXPRESSION: 0
TOTALSCORE: 0
BODYLANGUAGE: 0
BODYLANGUAGE: 0
NEGVOCALIZATION: 0
BODYLANGUAGE: 0

## 2017-09-29 ASSESSMENT — PAIN SCALES - GENERAL
PAINLEVEL_OUTOF10: 0
PAINLEVEL_OUTOF10: 0

## 2017-09-29 NOTE — CONSULTS
[address] CONSULT      Patient: Annalisa Renee  : 1940  Acct#: [de-identified]  Consult seen for:   Annalisa Renee is a 65 y.o. , female with anemia; vomiting ; ? Brown colored emesis . She was admitted for worsening Alzheimer' dementia, and weakness with poor appetite        ASSESSMENT:     1. Anemia; iron deficient per our office notes; stable H/H 9.6/28.5 (has been getting 2-3 liters of fluid a day the last few days)  2. Nausea/vomiting yesterday; family told staff that the patient has a bowel obstruction -they actually want an NG tube placed! Pt has pulled out IVs and is NOT a candidate for NG unless she starts having more issues. 3. Large hiatal hernia per previous EGD notes  4. Possible aspiration pneumonia per hospitalist notes-now eating ok  5. 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 Hypocalcemia; 8.4  7. Alzheimer's dementia  8. Hx falls      PLAN:   1. FOBT-came back positive   2. Check GI pathogen panel since staff has seen some slimy, green stool. No blood in stool has been seen. 3. On daily Protonix   4. Will need to f/u with Eloise Avalos CNP in the office   5. Sit upright during after eating (for 30-60 minutes)  6. Last saw Eloise Avalos CNP in . Note reviewed; she had EGD in the past and had shelby erosions of the esophagus with large hiatal hernia, had diarrhea with probably IBS related to her medications, microcytic hypochromic anemia. 7. Check iron since hx iron deficient anemia; no listed iron on PTA meds either--Iron is low at 44, saturation is low at 19%. Can try to get her to take po iron  8. Can add carafate; she only really vomited twice yesterday. 9. Can plan for EGD at 0900 tomorrow in the OR with DR Lyndon Tucker so we can have diprivan due to her dementia and swatting at staff; she will need well sedated. IF OR has an emergency case, she will be delayed for her procedure. 10. Antiemetics; she is sleeping and intermittently awake with NO vomiting today  11.  She needs IV access; she has pulled them out and they did not replace since she is supposed to be going to [facility]. HOWEVER, she cannot get the EGD done unless she has a working IV. 12. Will follow         HISTORY OF PRESENT ILLNESS     patient is sitting in bed asleep during most of the visit. Her  is in the room. I got most of the information from the staff. Patient was given pop yesterday and then threw up; 1 time was projectile. She has not thrown up today and had a whole breakfast that she ate around 0900. Therefore, I cannot get anesthesia to sedate her for an EGD. She had a green mucous type stool per the RN. We will check GI pathogen panel. Patient is supposed to be going to the ECF this weekend. Family has been hovering over the patient and has called multiple times to talk with the staff. We discussed an EGD if the family wants to have it done. I told the  that she HAS to have an IV. He stated that \"she doesn't like them\". I told him that she cannot get the procedure and get sedated without one. The family has called and told the RN that she has a bowel obstruction and are pretty much demanding an Xray. I will order a KUB. She has bowel sounds and her abdomen is soft and again, she is asleep most of the time without ANY signs of pain. I explained the EGD to the . I called endo to try to get this set up with anesthesia tomorrow. It will have to be in the OR. The RN will have IV team or someone try to get an IV access. Family is aware that she HAS TO LEAVE IT IN in order to get the EGD done. IF patient needs restraints, then she will need them until the procedure is done and the IV can be removed. Pain location none and pt was asleep during the majority of the assessment    Alleviating factors: na    EGD: 2013 with DR Jacqueline Mcintosh. Large hiatal hernia, shelby erosions in the hiatal hernia, mild erosive change in the antrum. Colonoscopy: 2012 with DR Jacqueline Mcintosh.  bx obtained due to chronic polydipsia. BLOOD:  + hx iron deficient anemia, no bleeding disorder        PHYSICAL EXAMINATION:      Vitals:    09/29/17 0333   BP: 137/60   Pulse: 68   Resp: 18   Temp: 98.9 °F (37.2 °C)   SpO2: 94%     GEN: Well nourished, well developed. LUNGS:  Clear to auscultation bilaterally. Chest rises equally on inspiration. CARDIOVASCULAR:  Regular rate and rhythm without murmurs, rubs or gallops. ABDOMEN:  Soft, nontender and nondistended with normal bowel sounds. EYES: TINA  ENT:  Ears symmetric, Neck supple, trachea midline, moist mucous membranes     EXTREMITIES:  No cyanosis, clubbing, or edema. DERM:  No rash or jaundice. NEURO:  Dementia   Moved arms, sitting in chair. PSYCHIATRIC: calm, pleasant    HOME MEDICATIONS      Prior to Admission medications    Medication Sig Start Date End Date Taking? Authorizing Provider   atenolol (TENORMIN) 100 MG tablet Take 100 mg by mouth daily   Yes Historical Provider, MD   hydrALAZINE (APRESOLINE) 50 MG tablet Take 50 mg by mouth 2 times daily   Yes Historical Provider, MD   ALPRAZolam (XANAX) 0.5 MG tablet Take 0.5 mg by mouth every 8 hours as needed for Anxiety   Yes Historical Provider, MD   PANTOPRAZOLE SODIUM PO Take 20 mg by mouth daily   Yes Historical Provider, MD   glycopyrrolate (ROBINUL) 1 MG tablet Take 1 mg by mouth 2 times daily   Yes Historical Provider, MD   atorvastatin (LIPITOR) 40 MG tablet Take 40 mg by mouth daily   Yes Historical Provider, MD   LISINOPRIL PO Take 60 mg by mouth daily   Yes Historical Provider, MD   dicyclomine (BENTYL) 20 MG tablet Take 20 mg by mouth 2 times daily   Yes Historical Provider, MD   ARIPiprazole (ABILIFY) 2 MG tablet Take 2 mg by mouth 2 times daily    Yes Historical Provider, MD   venlafaxine (EFFEXOR-XR) 75 MG XR capsule Take 75 mg by mouth 2 times daily. Yes Historical Provider, MD   simvastatin (ZOCOR) 40 MG tablet Take 40 mg by mouth nightly.    Yes Historical Provider, MD   donepezil (ARICEPT) 10

## 2017-09-29 NOTE — PROGRESS NOTES
RECONSTRUCTION WO POST PROCESS   Final Result   1. No acute fractures or acute subluxations. 2.  Discogenic and hypertrophic osteoarthritic/degenerative changes as described level by level mostly affecting the lower lumbar spine. These have a chronic appearance. **This report has been created using voice recognition software. It may contain minor errors which are inherent in voice recognition technology. **      Final report electronically signed by Dr. Balwinder Flanagan on 9/22/2017 10:18 PM      CT THORACIC RECONSTRUCTION WO POST PROCESS   Final Result   1. No acute fractures or acute subluxations. 2.  Minimal degenerative changes. 3.  No obvious discogenic acute processes or disc bulges. CT is less sensitive than MRI for disc pathology. **This report has been created using voice recognition software. It may contain minor errors which are inherent in voice recognition technology. **      Final report electronically signed by Dr. Balwinder Flanagan on 9/22/2017 10:15 PM      CT ABDOMEN PELVIS W IV CONTRAST Additional Contrast? None   Final Result   1. Very large fluid-filled hiatal hernia. The intra-abdominal portion of the stomach is distended with a large gas fluid level. 2.  No bowel obstruction. 3.  Increase in the size of the left ovarian cyst. In the postmenopausal patient systems this side is almost certainly benign but should be followed annually with ultrasound. 4.  Stable small likely simple cyst in the left kidney inferiorly. 5.  Moderate diverticulosis of the colon which looks unchanged and shows no acute process associated with it. **This report has been created using voice recognition software. It may contain minor errors which are inherent in voice recognition technology. **      Final report electronically signed by Dr. Balwinder Flanagan on 9/22/2017 5:12 PM      CTA CHEST [address]   Final Result   1. No pulmonary emboli are seen. . Questionable pulmonary arterial hypertension. 2. Large hiatus hernia. Moderately severe distention of the stomach with fluid and air. 3. Mild infiltrates in both lungs. See comments above. **This report has been created using voice recognition software. It may contain minor errors which are inherent in voice recognition technology. **          Final report electronically signed by Dr. Soumya Parr on 9/22/2017 4:58 PM      XR Chest Portable   Final Result   Large fixed hiatal hernia. No definite acute findings. **This report has been created using voice recognition software. It may contain minor errors which are inherent in voice recognition technology. **      Final report electronically signed by Dr. Chioma Ward on 9/22/2017 12:53 PM          Diet: DIET GENERAL;  Dietary Nutrition Supplements: Standard High Calorie Oral Supplement    DVT prophylaxis: [] Lovenox                                 [x] SCDs                                 [] SQ Heparin                                 [] Encourage ambulation           [] Already on Anticoagulation     Disposition:    [] Home       [] TCU       [] Rehab       [] Psych       [x] SNF       [] [place]       [] Other-    Code Status: DNR-CCA    PT/OT Eval Status: current    Assessment/Plan:    Anticipated Discharge in : 1-2 days    Active Hospital Problems    Diagnosis Date Noted    Hiatus hernia with compression intrathoracic [K44.0] 09/22/2017     Priority: High     Class: Acute    Chest pain [R07.9] 09/22/2017     Priority: High     Class: Acute    Spinal stenosis at L4-L5 level  with left leg weakness and urinary retention [M48.06] 09/22/2017     Priority: High     Class: Acute    Weakness of left lower extremity  spinal stenosis  or cva [R29.898] 09/22/2017     Priority: High     Class: Acute    Urinary retention  neurogenic bladder [R33.9] 09/22/2017     Priority: High     Class: Chronic    Alzheimer's dementia with behavioral disturbance [G30.8, F02.81] 09/22/2017     Priority: High     Class: Acute    Aspiration pneumonia ([facility] [address]  bilateral  from large hiatus hernia [J69.0] 09/22/2017     Priority: High     Class: Acute    Fever [R50.9]     Dehydration [E86.0]      1. Chest pain--resolved; ACS ruled out with neg trops and EKG; CTA neg for PE or other thoracic pathology; questionable aspiration pna  2. Possible aspiration pneumonia (POA)--resolved; ground glass appearance bilat lung fields per CTA; WBC elevated 9/22; procal has been negative and afebrile; Levaquin and azithromycin in ER; Zosyn 9/23-24 and stopped on 9/25; no SOB, cough, sputum  3. Generalized weakness--related to progressing dementia and poor appetite; cont PT/OT  4. Acute blood loss anemia--> 2 gm drop overnight; black stools; stop Lovenox and add SCDs for DVT proph; GI consult; H/H 1200; CBC am  5. Nausea and vomiting--has been intermittent over last 2 days, occurring with eating and meds; nursing reports emesis has been brown-no coffee ground material; cont prn Zofran-IM; patient w/o IV access at this time due to pulling out; GI consult; related to #8? 6. Leukocytosis--resolved  7. Urinary retention--russell had been removed but replaced on 9/27 due to  ml;  reports she would go up to 39 hours without voiding at home but would go a large amount when she would finally void; will need to be discharged with russell and f/u with urology 1 week after discharge  8. Large hiatal hernia--per CT; cont PPI;  has been educated on keeping patient sitting up when eating and 45 min post  9. Alzheimer's demential with behavior disturbance--has been uncooperative and combative off and on; hitting at therapist this morning but then cooperative with NP. Psych following; cont Aricept, Abilify, Effexor; Xanax prn  10. KUMAR--resloved; creat 1.3 on 9/24 and now back to baseline; IVF have been stopped; encourage po intake  11.  Anorexia--related to dementia; Marinol stopped due to potential cause of N/V; encourage po intake; dietician following  12. Hypokalemia--resolved  13. Metabolic acidosis--resolved  14. Lumbar foraminal stenosis--L4-S1; chronic; PT/OT  15.  Hyponatremia--resolved    Dispo: FOBS; GI consults; H/H at 1200; CBC am  Discussed with Dr. Jordan Sigala    Electronically signed by Kimberly Dickson NP on 9/29/2017 at 8:11 AM       Addendum:  FOBS +; H/H stable; GI saw and planning EGD 9/30    Electronically signed by Kimberly Dickson NP on 9/29/2017 at 1:17 PM

## 2017-09-29 NOTE — PLAN OF CARE
Problem: Discharge Planning:  Goal: Discharged to appropriate level of care  Discharged to appropriate level of care   Outcome: Not Met This Shift  Plan on going to [facility] upon completion of care at [facility]. Change in condition. Positive OB. Plan for testing on Saturday and then revaluate. Problem: Cardiac Output - Decreased:  Goal: Hemodynamic stability will improve  Hemodynamic stability will improve   Outcome: Ongoing  Vitals signs 130/64, 132/65 with HR 89. No heart monitor on. Pt went to bed and eyes closed for nap. Problem: Tissue Perfusion - Cardiopulmonary, Altered:  Goal: Absence of angina  Absence of angina   Outcome: Met This Shift  Denies any chest pain. Problem: Safety:  Goal: Ability to chew and swallow food without choking will improve  Ability to chew and swallow food without choking will improve   Outcome: Met This Shift  Am meal taken in chair in high fowlers position. Small bites taken. No cough post swallowing. Problem: Nutrition  Goal: Optimal nutrition therapy  Outcome: Ongoing  Protein supplement taken 100%. Offered proteins like eggs small bites taken well. Problem: Falls - Risk of:  Goal: Will remain free from falls  Will remain free from falls   Outcome: Ongoing  No falls noted this shift. Patient ambulates with x2 staff assistance with difficulty. Family member at bedside, spent the night. Bed kept in low position. Safe environment maintained. Bedside table & call light in reach. Family at bedside uses call light appropriately when needing assistance. Non skid slipper on . Problem: Pain:  Goal: Pain level will decrease  Pain level will decrease   Outcome: Ongoing  Advanced dementia scale used for pain assessment outcome zero. Comments:   Care plan reviewed with patient and spouse. Patient and spouse verbalize understanding of the plan of care and contribute to goal setting.   Electronically signed by Sabra Bose RN on 9/29/2017 at 2:42 PM

## 2017-09-29 NOTE — PROGRESS NOTES
Pt up to chair. Incontinent of dark greenish stool with mucous. Pt is striking at staff. CNP at bedside and staff working with pt. Pt has calmed down and is up to chair. Quiet. Stool specimen sent to lab.

## 2017-09-29 NOTE — PROGRESS NOTES
Physical Therapy   [address] MED SURG 8B    Time In: 1177  Time Out: 7280  Timed Code Treatment Minutes: 23 Minutes  Minutes: 23          Date: 2017  Patient Name: Antwan Barrett,  Gender:  female        MRN: [id number]  : 1940  (65 y.o.)  Referral Date : 17  Referring Practitioner: Brandon Rocha PA-C  Diagnosis: chest pain  Additional Pertinent Hx:  Antwan Barrett is a 65 y.o. female with a past medical history of Dementia, GERD, Hyperlipidemia, and Hypertension who presents to the ED vis EMS for evaluation of shortness of breath. Per family, the patient has been complaining of feeling short of breath at night, particularly when laying flat. Family has noticed that when the patient is laying down to sleep she seems to be working harder to breath. These symptoms improve when the patient is placed into an upright seated position. She does not have any known lung diseases or history of CHF. The patient denies feeling short of breath at this time, but does report having occasional chest pain. Family also states that the patient had nausea and emesis two days ago, which has since resolved however the patient had been gagging more often when taking her medications. The patient does not use any chronic home O2 support. She is not a smoker. No additional complaints or concerns at the time of initial evaluation.       Past Medical History:   Diagnosis Date    Aspiration pneumonia (HCC)  bilateral  from large hiatus hernia 2017    Dementia     Depression     GERD (gastroesophageal reflux disease)     Hyperlipidemia     Hypertension      Past Surgical History:   Procedure Laterality Date    CHOLECYSTECTOMY      COLONOSCOPY      UPPER GASTROINTESTINAL ENDOSCOPY         Restrictions/Precautions:  Restrictions/Precautions: Fall Risk, General Precautions            Position Activity Restriction  Other position/activity restrictions: Head of around  Mobility: Walking and Moving Around Current Status (): At least 1 percent but less than 20 percent impaired, limited or restricted  Mobility: Walking and Moving Around Goal Status ():  At least 1 percent but less than 20 percent impaired, limited or restricted

## 2017-09-30 ENCOUNTER — ANESTHESIA (OUTPATIENT)
Dept: OPERATING ROOM | Age: 77
DRG: 177 | End: 2017-09-30
Payer: MEDICARE

## 2017-09-30 VITALS — SYSTOLIC BLOOD PRESSURE: 126 MMHG | DIASTOLIC BLOOD PRESSURE: 49 MMHG | OXYGEN SATURATION: 94 %

## 2017-09-30 LAB
ADENOVIRUS F 40 41 PCR: NOT DETECTED
ANISOCYTOSIS: ABNORMAL
ASTROVIRUS PCR: NOT DETECTED
BASOPHILS # BLD: 0.4 %
BASOPHILS ABSOLUTE: 0 THOU/MM3 (ref 0–0.1)
CAMPYLOBACTER PCR: NOT DETECTED
CLOSTRIDIUM DIFFICILE, PCR: NOT DETECTED
CRYPTOSPORIDIUM PCR: NOT DETECTED
CYCLOSPORIDIUM GI FILM ARRAY: NOT DETECTED
E COLI 0157 PCR: NORMAL
E COLI ENTEROAGGREGATIVE PCR: NOT DETECTED
E COLI ENTEROPATHOGENIC PCR: NOT DETECTED
E COLI ENTEROTOXIGENIC PCR: NOT DETECTED
E COLI SHIGA LIKE TOXIN PCR: NOT DETECTED
E COLI SHIGELLA/ENTEROINVASIVE PCR: NOT DETECTED
E HISTOLYTICA GI FILM ARRAY: NOT DETECTED
EOSINOPHIL # BLD: 1 %
EOSINOPHILS ABSOLUTE: 0.1 THOU/MM3 (ref 0–0.4)
GIARDIA LAMBLIA PCR: NOT DETECTED
HCT VFR BLD CALC: 32.2 % (ref 37–47)
HEMOGLOBIN: 10.6 GM/DL (ref 12–16)
IRON: 41 UG/DL (ref 50–170)
LYMPHOCYTES # BLD: 14.5 %
LYMPHOCYTES ABSOLUTE: 1.5 THOU/MM3 (ref 1–4.8)
MCH RBC QN AUTO: 28.1 PG (ref 27–31)
MCHC RBC AUTO-ENTMCNC: 33.1 GM/DL (ref 33–37)
MCV RBC AUTO: 84.9 FL (ref 81–99)
MONOCYTES # BLD: 7.9 %
MONOCYTES ABSOLUTE: 0.8 THOU/MM3 (ref 0.4–1.3)
NOROVIRUS GI GII PCR: NOT DETECTED
NUCLEATED RED BLOOD CELLS: 0 /100 WBC
PDW BLD-RTO: 17.3 % (ref 11.5–14.5)
PLATELET # BLD: 351 THOU/MM3 (ref 130–400)
PLESIOMONAS SHIGELLOIDES PCR: NOT DETECTED
PMV BLD AUTO: 7.1 MCM (ref 7.4–10.4)
RBC # BLD: 3.79 MILL/MM3 (ref 4.2–5.4)
RBC # BLD: NORMAL 10*6/UL
ROTAVIRUS A PCR: NOT DETECTED
SALMONELLA PCR: NOT DETECTED
SAPOVIRUS PCR: NOT DETECTED
SEG NEUTROPHILS: 76.2 %
SEGMENTED NEUTROPHILS ABSOLUTE COUNT: 7.9 THOU/MM3 (ref 1.8–7.7)
VIBRIO CHOLERAE PCR: NOT DETECTED
VIBRIO PCR: NOT DETECTED
WBC # BLD: 10.4 THOU/MM3 (ref 4.8–10.8)
YERSINIA ENTEROCOLITICA PCR: NOT DETECTED

## 2017-09-30 PROCEDURE — 3700000000 HC ANESTHESIA ATTENDED CARE: Performed by: INTERNAL MEDICINE

## 2017-09-30 PROCEDURE — 3600007511: Performed by: INTERNAL MEDICINE

## 2017-09-30 PROCEDURE — 85025 COMPLETE CBC W/AUTO DIFF WBC: CPT

## 2017-09-30 PROCEDURE — 83540 ASSAY OF IRON: CPT

## 2017-09-30 PROCEDURE — 88305 TISSUE EXAM BY PATHOLOGIST: CPT

## 2017-09-30 PROCEDURE — 6370000000 HC RX 637 (ALT 250 FOR IP): Performed by: PHYSICIAN ASSISTANT

## 2017-09-30 PROCEDURE — 6370000000 HC RX 637 (ALT 250 FOR IP): Performed by: INTERNAL MEDICINE

## 2017-09-30 PROCEDURE — 3600007501: Performed by: INTERNAL MEDICINE

## 2017-09-30 PROCEDURE — 2580000003 HC RX 258: Performed by: NURSE ANESTHETIST, CERTIFIED REGISTERED

## 2017-09-30 PROCEDURE — 6370000000 HC RX 637 (ALT 250 FOR IP): Performed by: NURSE PRACTITIONER

## 2017-09-30 PROCEDURE — 6370000000 HC RX 637 (ALT 250 FOR IP): Performed by: ANESTHESIOLOGY

## 2017-09-30 PROCEDURE — 99232 SBSQ HOSP IP/OBS MODERATE 35: CPT | Performed by: NURSE PRACTITIONER

## 2017-09-30 PROCEDURE — 3700000001 HC ADD 15 MINUTES (ANESTHESIA): Performed by: INTERNAL MEDICINE

## 2017-09-30 PROCEDURE — 6360000002 HC RX W HCPCS: Performed by: ANESTHESIOLOGY

## 2017-09-30 PROCEDURE — 36415 COLL VENOUS BLD VENIPUNCTURE: CPT

## 2017-09-30 PROCEDURE — 0DB68ZX EXCISION OF STOMACH, VIA NATURAL OR ARTIFICIAL OPENING ENDOSCOPIC, DIAGNOSTIC: ICD-10-PCS | Performed by: INTERNAL MEDICINE

## 2017-09-30 PROCEDURE — 1200000003 HC TELEMETRY R&B

## 2017-09-30 PROCEDURE — 6360000002 HC RX W HCPCS: Performed by: NURSE ANESTHETIST, CERTIFIED REGISTERED

## 2017-09-30 RX ORDER — PANTOPRAZOLE SODIUM 40 MG/1
40 TABLET, DELAYED RELEASE ORAL
Status: DISCONTINUED | OUTPATIENT
Start: 2017-09-30 | End: 2017-10-04 | Stop reason: HOSPADM

## 2017-09-30 RX ORDER — QUINIDINE GLUCONATE 324 MG
300 TABLET, EXTENDED RELEASE ORAL
Status: DISCONTINUED | OUTPATIENT
Start: 2017-09-30 | End: 2017-10-04 | Stop reason: HOSPADM

## 2017-09-30 RX ORDER — PANTOPRAZOLE SODIUM 40 MG/10ML
40 INJECTION, POWDER, LYOPHILIZED, FOR SOLUTION INTRAVENOUS 2 TIMES DAILY
Status: DISCONTINUED | OUTPATIENT
Start: 2017-09-30 | End: 2017-09-30

## 2017-09-30 RX ORDER — PROPOFOL 10 MG/ML
INJECTION, EMULSION INTRAVENOUS PRN
Status: DISCONTINUED | OUTPATIENT
Start: 2017-09-30 | End: 2017-09-30 | Stop reason: SDUPTHER

## 2017-09-30 RX ORDER — SODIUM CHLORIDE 9 MG/ML
INJECTION, SOLUTION INTRAVENOUS CONTINUOUS PRN
Status: DISCONTINUED | OUTPATIENT
Start: 2017-09-30 | End: 2017-09-30 | Stop reason: SDUPTHER

## 2017-09-30 RX ADMIN — SODIUM CHLORIDE: 9 INJECTION, SOLUTION INTRAVENOUS at 09:02

## 2017-09-30 RX ADMIN — VENLAFAXINE HYDROCHLORIDE 75 MG: 75 CAPSULE, EXTENDED RELEASE ORAL at 12:58

## 2017-09-30 RX ADMIN — ASPIRIN 81 MG 81 MG: 81 TABLET ORAL at 20:24

## 2017-09-30 RX ADMIN — ARIPIPRAZOLE 2 MG: 2 TABLET ORAL at 12:59

## 2017-09-30 RX ADMIN — PROPOFOL 50 MG: 10 INJECTION, EMULSION INTRAVENOUS at 09:02

## 2017-09-30 RX ADMIN — SUCRALFATE 1 G: 1 SUSPENSION ORAL at 13:00

## 2017-09-30 RX ADMIN — PANTOPRAZOLE SODIUM 40 MG: 40 TABLET, DELAYED RELEASE ORAL at 18:11

## 2017-09-30 RX ADMIN — HYDRALAZINE HYDROCHLORIDE 10 MG: 20 INJECTION INTRAMUSCULAR; INTRAVENOUS at 05:55

## 2017-09-30 RX ADMIN — VENLAFAXINE HYDROCHLORIDE 75 MG: 75 CAPSULE, EXTENDED RELEASE ORAL at 20:26

## 2017-09-30 RX ADMIN — ARIPIPRAZOLE 2 MG: 2 TABLET ORAL at 20:24

## 2017-09-30 RX ADMIN — LOPERAMIDE HYDROCHLORIDE 2 MG: 2 CAPSULE ORAL at 20:24

## 2017-09-30 RX ADMIN — Medication 360 MG: at 18:10

## 2017-09-30 RX ADMIN — SUCRALFATE 1 G: 1 SUSPENSION ORAL at 18:10

## 2017-09-30 RX ADMIN — SUCRALFATE 1 G: 1 SUSPENSION ORAL at 05:23

## 2017-09-30 RX ADMIN — Medication 360 MG: at 12:58

## 2017-09-30 RX ADMIN — DONEPEZIL HYDROCHLORIDE 20 MG: 10 TABLET, FILM COATED ORAL at 12:59

## 2017-09-30 RX ADMIN — ATENOLOL 100 MG: 100 TABLET ORAL at 12:58

## 2017-09-30 RX ADMIN — GLYCOPYRROLATE 1 MG: 1 TABLET ORAL at 13:00

## 2017-09-30 RX ADMIN — GLYCOPYRROLATE 1 MG: 1 TABLET ORAL at 20:24

## 2017-09-30 RX ADMIN — PROPOFOL 30 MG: 10 INJECTION, EMULSION INTRAVENOUS at 09:04

## 2017-09-30 RX ADMIN — ALPRAZOLAM 0.5 MG: 0.5 TABLET ORAL at 17:42

## 2017-09-30 ASSESSMENT — PULMONARY FUNCTION TESTS
PIF_VALUE: 0

## 2017-09-30 ASSESSMENT — PAIN SCALES - PAIN ASSESSMENT IN ADVANCED DEMENTIA (PAINAD)
CONSOLABILITY: 0
BREATHING: 0
NEGVOCALIZATION: 0
TOTALSCORE: 0
BODYLANGUAGE: 0
FACIALEXPRESSION: 0

## 2017-09-30 ASSESSMENT — PAIN SCALES - GENERAL
PAINLEVEL_OUTOF10: 0

## 2017-09-30 NOTE — CONSULTS
Brynn Mckinney MD, 10 mg at 09/30/17 0555    lidocaine PF 1 % injection 5 mL, 5 mL, Intradermal, Once, Renae Mayo MD    sodium chloride flush 0.9 % injection 10 mL, 10 mL, Intravenous, 2 times per day, Renae Mayo MD    sodium chloride flush 0.9 % injection 10 mL, 10 mL, Intravenous, PRN, Renae Mayo MD    glycopyrrolate (ROBINUL) tablet 1 mg, 1 mg, Oral, BID, Mckinley Benedict DO, 1 mg at 09/29/17 2144    bisacodyl (DULCOLAX) suppository 10 mg, 10 mg, Rectal, Daily PRN, Monalisa Bryan PA-C, 10 mg at 09/28/17 2211    potassium replacement protocol, , Other, RX Placeholder, Monalisa Bryan PA-C    influenza quadrivalent split vaccine (FLUZONE;FLUARIX) injection 0.5 mL, 0.5 mL, Intramuscular, Once, Jennifer Malcolm MD    ALPRAZolam Rolle Cordell) tablet 0.5 mg, 0.5 mg, Oral, Q8H PRN, Monalisa Bryan PA-C, 0.5 mg at 09/29/17 2213    donepezil (ARICEPT) tablet 20 mg, 20 mg, Oral, Daily, Monalisa Bryan PA-C, 20 mg at 09/29/17 1014    ipratropium-albuterol (DUONEB) nebulizer solution 1 ampule, 1 ampule, Inhalation, Q4H PRN, Monalisa Bryan PA-C, 1 ampule at 09/25/17 1456    ARIPiprazole (ABILIFY) tablet 2 mg, 2 mg, Oral, BID, Monalisa Bryan PA-C, 2 mg at 09/29/17 2144    venlafaxine (EFFEXOR XR) extended release capsule 75 mg, 75 mg, Oral, BID, Jennifer Malcolm MD, 75 mg at 09/29/17 2153    loperamide (IMODIUM) capsule 2 mg, 2 mg, Oral, Nightly, Jennifer Malcolm MD, 2 mg at 09/29/17 2143    aspirin chewable tablet 81 mg, 81 mg, Oral, Nightly, Jennifer Malcolm MD, 81 mg at 09/29/17 2143    acetaminophen (TYLENOL) suppository 650 mg, 650 mg, Rectal, Q8H PRN, Jennifer Malcolm MD, 650 mg at 09/24/17 1845    pantoprazole (PROTONIX) tablet 40 mg, 40 mg, Oral, QAM AC, Jennifer Malcolm MD, 40 mg at 09/29/17 5692    Facility-Administered Medications Ordered in Other Encounters:     propofol injection, , , PRN, Mark Murphy CRNA, 30 mg at 09/30/17 0904    0.9 % sodium chloride infusion, , , Continuous PRN, Masoud He CRNA  Prior to Admission medications    Medication Sig Start Date End Date Taking? Authorizing Provider   atenolol (TENORMIN) 100 MG tablet Take 100 mg by mouth daily   Yes Historical Provider, MD   hydrALAZINE (APRESOLINE) 50 MG tablet Take 50 mg by mouth 2 times daily   Yes Historical Provider, MD   ALPRAZolam (XANAX) 0.5 MG tablet Take 0.5 mg by mouth every 8 hours as needed for Anxiety   Yes Historical Provider, MD   PANTOPRAZOLE SODIUM PO Take 20 mg by mouth daily   Yes Historical Provider, MD   glycopyrrolate (ROBINUL) 1 MG tablet Take 1 mg by mouth 2 times daily   Yes Historical Provider, MD   atorvastatin (LIPITOR) 40 MG tablet Take 40 mg by mouth daily   Yes Historical Provider, MD   LISINOPRIL PO Take 60 mg by mouth daily   Yes Historical Provider, MD   dicyclomine (BENTYL) 20 MG tablet Take 20 mg by mouth 2 times daily   Yes Historical Provider, MD   ARIPiprazole (ABILIFY) 2 MG tablet Take 2 mg by mouth 2 times daily    Yes Historical Provider, MD   venlafaxine (EFFEXOR-XR) 75 MG XR capsule Take 75 mg by mouth 2 times daily. Yes Historical Provider, MD   simvastatin (ZOCOR) 40 MG tablet Take 40 mg by mouth nightly. Yes Historical Provider, MD   donepezil (ARICEPT) 10 MG tablet Take 20 mg by mouth daily    Yes Historical Provider, MD   loperamide (IMODIUM) 2 MG capsule Take 2 mg by mouth nightly.    Yes Historical Provider, MD   aspirin 81 MG tablet Take 81 mg by mouth    Yes Historical Provider, MD   omeprazole (PRILOSEC) 10 MG capsule Take 20 mg by mouth daily    Yes Historical Provider, MD     Additional information:       PHYSICAL:   Heart:  [x]Regular rate and rhythm  []Other:    Lungs:  [x]Clear    []Other:    Abdomen: [x]Soft    []Other:    Mental Status: [x]Alert & Oriented  []Other:      VITAL SIGNS   Patient Vitals for the past 24 hrs:   BP Temp Temp src Pulse Resp SpO2   09/30/17 0758 (!) 164/73 98.6 °F (37 °C) Oral 62 16 94 %   09/30/17 0641 132/60 - - 69 - -   09/30/17 0549 (!) 196/118 98.6 °F (37 °C) Oral 53 16 92 %   09/30/17 0047 (!) 149/81 97.6 °F (36.4 °C) Axillary 53 16 92 %   09/29/17 2052 (!) 172/74 97.9 °F (36.6 °C) Axillary 58 18 92 %   09/29/17 1646 (!) 140/68 98.1 °F (36.7 °C) Oral 61 16 91 %   09/29/17 1121 132/65 97.9 °F (36.6 °C) Oral 74 16 94 %   09/29/17 0945 130/64 97.7 °F (36.5 °C) Oral 89 18 94 %       PLANNED PROCEDURE  [x]EGD  []Colonoscopy []Flex Sigmoid  []ERCP []EUS   []Cystoscopy  [] CATH [] BRONCH   Consent: I have discussed with the patient and/or the patient representative the indication, alternatives, and the possible risks and/or complications of the planned procedure and the anesthesia methods. The patient and/or patient representative appear to understand and agree to proceed. SEDATION  Planned agent:[]Midazolam []Meperidine []Sublimaze []Morphine  []Diazepam [x]Propofol  []Other:     ASA Classification: Class 3  A patient with severe systemic disease that limits activity but is not incapacitating    Airway Assessment: normal    Monitoring and Safety: The patient will be placed on a cardiac monitor and vital signs, pulse oximetry and level of consciousness will be continuously evaluated throughout the procedure. The patient will be closely monitored until recovery from the medications is complete and the patient has returned to baseline status. Respiratory therapy will be on standby during the procedure. [x]Pre-procedure diagnostic studies complete and results available. Comment:    [x]Previous sedation/anesthesia experiences assessed. Comment:    [x]The patient is an appropriate candidate to undergo the planned procedure sedation and anesthesia. (Refer to nursing sedation/analgesia documentation record)  [x]Formulation and discussion of sedation/procedure plan, risks, and expectations with patient and/or responsible adult completed.   [x]Patient examined immediately prior to the procedure.  (Refer to nursing sedation/analgesia documentation record)    Alphonse Mora MD   Electronically signed 9/30/2017 at 9:26 AM

## 2017-09-30 NOTE — OP NOTE
well.    IMPRESSION:  1. Erosive esophagitis, LA grade A probably from retching. 2.  Large hiatal hernia. 3.  Old fluid and dilated stomach. 4.  Gastric erosions, rule out HP disease. I feel that we are dealing with GI dysmotility. Small bowel  obstruction is not clinically there, but there might be small bowel  dysmotility as well. Bleeding and _____ anemia is from erosive  esophagitis which is secondary process from retching. The patient is  not a candidate for repair of hiatal hernia. With her advanced  dementia, I feel that conservative care is probably advised. I had a  good discussion with  and two daughters in detail. We are  planning to take the patient to one of the centers in [place] and we  will keep the patient on clear liquid. I will see the patient how she  is doing tomorrow.         Elizabeth Hunter M.D.    D: 09/30/2017 9:44:51       T: 09/30/2017 11:57:22     SONIA  Job#: [id number]     Doc#: [id number]

## 2017-09-30 NOTE — PLAN OF CARE
Problem: Discharge Planning:  Goal: Discharged to appropriate level of care  Discharged to appropriate level of care   Outcome: Ongoing  Patient plans to be discharged to an ECF. Problem: Cardiac Output - Decreased:  Goal: Hemodynamic stability will improve  Hemodynamic stability will improve   Outcome: Ongoing  Vitals monitored and recorded. Patient hemodynamically stable. Cardiac monitor in place with strips being read every four hours. Problem: Tissue Perfusion - Cardiopulmonary, Altered:  Goal: Absence of angina  Absence of angina   Outcome: Ongoing  Patient is free of chest pain. Troponin levels and EKG monitors being monitored. Pulse oximetry being monitored every four hours. Problem: Safety:  Goal: Ability to chew and swallow food without choking will improve  Ability to chew and swallow food without choking will improve   Outcome: Ongoing  Patient has swallowed medications without choking, gaging or vomiting this shift. Problem: Falls - Risk of:  Goal: Will remain free from falls  Will remain free from falls   Outcome: Ongoing  Patient free of falls this shift. Patient on falling star program. Fall band intact. RN visually checks on patient with hourly rounds. Patient tolerates ambulation each shift.

## 2017-09-30 NOTE — CONSULTS
Michelle 54  Sedation/Analgesia Post Sedation Record    Patient: Micky Castro : 1940  Med Rec#: [id number] Acc#: [id number]   Procedure Performed By: Mp Mayes  Primary Care Physician: Vick Triana DO    POST-PROCEDURE    Physicians/Assistants: Mp Mayes  Procedure Performed:    Sedation/Anesthesia:     Estimated Blood Loss:          ml  Specimens Removed:  [x]None []Other:      Disposition of Specimen:  []Pathology [x]Other      Complications:   [x]None Immediate []Other:     Post-procedure Diagnosis/Findings:             Recommendations:    N/v sec to gi dysmotility         Conservative care  Mp Mayes MD [facility]  Electronically signed 2017 at 9:29 AM

## 2017-09-30 NOTE — PROGRESS NOTES
Instructed pts spouse (POA) about EGD procedure in the AM. Consent signed. Pt is up in chair eyes closed.

## 2017-10-01 LAB
ANION GAP SERPL CALCULATED.3IONS-SCNC: 11 MEQ/L (ref 8–16)
ANISOCYTOSIS: ABNORMAL
BASOPHILS # BLD: 0.7 %
BASOPHILS ABSOLUTE: 0.1 THOU/MM3 (ref 0–0.1)
BUN BLDV-MCNC: 22 MG/DL (ref 7–22)
CALCIUM SERPL-MCNC: 8.2 MG/DL (ref 8.5–10.5)
CHLORIDE BLD-SCNC: 95 MEQ/L (ref 98–111)
CO2: 25 MEQ/L (ref 23–33)
CREAT SERPL-MCNC: 0.8 MG/DL (ref 0.4–1.2)
EOSINOPHIL # BLD: 4.4 %
EOSINOPHILS ABSOLUTE: 0.4 THOU/MM3 (ref 0–0.4)
GFR SERPL CREATININE-BSD FRML MDRD: 69 ML/MIN/1.73M2
GLUCOSE BLD-MCNC: 98 MG/DL (ref 70–108)
HCT VFR BLD CALC: 29.2 % (ref 37–47)
HEMOGLOBIN: 10 GM/DL (ref 12–16)
LYMPHOCYTES # BLD: 21.9 %
LYMPHOCYTES ABSOLUTE: 2.1 THOU/MM3 (ref 1–4.8)
MCH RBC QN AUTO: 28.3 PG (ref 27–31)
MCHC RBC AUTO-ENTMCNC: 34.1 GM/DL (ref 33–37)
MCV RBC AUTO: 82.9 FL (ref 81–99)
MONOCYTES # BLD: 9 %
MONOCYTES ABSOLUTE: 0.9 THOU/MM3 (ref 0.4–1.3)
NUCLEATED RED BLOOD CELLS: 0 /100 WBC
PDW BLD-RTO: 17.3 % (ref 11.5–14.5)
PLATELET # BLD: 327 THOU/MM3 (ref 130–400)
PMV BLD AUTO: 7 MCM (ref 7.4–10.4)
POTASSIUM SERPL-SCNC: 3.8 MEQ/L (ref 3.5–5.2)
RBC # BLD: 3.52 MILL/MM3 (ref 4.2–5.4)
RBC # BLD: NORMAL 10*6/UL
SEG NEUTROPHILS: 64 %
SEGMENTED NEUTROPHILS ABSOLUTE COUNT: 6.1 THOU/MM3 (ref 1.8–7.7)
SODIUM BLD-SCNC: 131 MEQ/L (ref 135–145)
WBC # BLD: 9.6 THOU/MM3 (ref 4.8–10.8)

## 2017-10-01 PROCEDURE — 6370000000 HC RX 637 (ALT 250 FOR IP): Performed by: PHYSICIAN ASSISTANT

## 2017-10-01 PROCEDURE — 36415 COLL VENOUS BLD VENIPUNCTURE: CPT

## 2017-10-01 PROCEDURE — 99232 SBSQ HOSP IP/OBS MODERATE 35: CPT | Performed by: NURSE PRACTITIONER

## 2017-10-01 PROCEDURE — 6370000000 HC RX 637 (ALT 250 FOR IP): Performed by: NURSE PRACTITIONER

## 2017-10-01 PROCEDURE — 6370000000 HC RX 637 (ALT 250 FOR IP): Performed by: INTERNAL MEDICINE

## 2017-10-01 PROCEDURE — 80048 BASIC METABOLIC PNL TOTAL CA: CPT

## 2017-10-01 PROCEDURE — 6370000000 HC RX 637 (ALT 250 FOR IP): Performed by: ANESTHESIOLOGY

## 2017-10-01 PROCEDURE — 85025 COMPLETE CBC W/AUTO DIFF WBC: CPT

## 2017-10-01 PROCEDURE — 1200000003 HC TELEMETRY R&B

## 2017-10-01 RX ORDER — ATENOLOL 50 MG/1
50 TABLET ORAL DAILY
Status: DISCONTINUED | OUTPATIENT
Start: 2017-10-02 | End: 2017-10-04 | Stop reason: HOSPADM

## 2017-10-01 RX ADMIN — SUCRALFATE 1 G: 1 SUSPENSION ORAL at 17:42

## 2017-10-01 RX ADMIN — DONEPEZIL HYDROCHLORIDE 20 MG: 10 TABLET, FILM COATED ORAL at 08:30

## 2017-10-01 RX ADMIN — Medication 360 MG: at 13:14

## 2017-10-01 RX ADMIN — PANTOPRAZOLE SODIUM 40 MG: 40 TABLET, DELAYED RELEASE ORAL at 05:08

## 2017-10-01 RX ADMIN — GLYCOPYRROLATE 1 MG: 1 TABLET ORAL at 08:31

## 2017-10-01 RX ADMIN — VENLAFAXINE HYDROCHLORIDE 75 MG: 75 CAPSULE, EXTENDED RELEASE ORAL at 20:23

## 2017-10-01 RX ADMIN — VENLAFAXINE HYDROCHLORIDE 75 MG: 75 CAPSULE, EXTENDED RELEASE ORAL at 08:30

## 2017-10-01 RX ADMIN — PANTOPRAZOLE SODIUM 40 MG: 40 TABLET, DELAYED RELEASE ORAL at 15:36

## 2017-10-01 RX ADMIN — ARIPIPRAZOLE 2 MG: 2 TABLET ORAL at 20:26

## 2017-10-01 RX ADMIN — ARIPIPRAZOLE 2 MG: 2 TABLET ORAL at 08:31

## 2017-10-01 RX ADMIN — Medication 360 MG: at 15:36

## 2017-10-01 RX ADMIN — ALPRAZOLAM 0.5 MG: 0.5 TABLET ORAL at 02:01

## 2017-10-01 RX ADMIN — SUCRALFATE 1 G: 1 SUSPENSION ORAL at 05:08

## 2017-10-01 RX ADMIN — SUCRALFATE 1 G: 1 SUSPENSION ORAL at 00:03

## 2017-10-01 RX ADMIN — SUCRALFATE 1 G: 1 SUSPENSION ORAL at 13:13

## 2017-10-01 RX ADMIN — ALPRAZOLAM 0.5 MG: 0.5 TABLET ORAL at 10:06

## 2017-10-01 RX ADMIN — ALPRAZOLAM 0.5 MG: 0.5 TABLET ORAL at 22:11

## 2017-10-01 RX ADMIN — Medication 360 MG: at 08:31

## 2017-10-01 ASSESSMENT — PAIN SCALES - GENERAL
PAINLEVEL_OUTOF10: 0

## 2017-10-01 NOTE — PROGRESS NOTES
100  95*   CO2  25  25   BUN  22  22   CREATININE  0.9  0.8   CALCIUM  8.4*  8.2*     No results for input(s): AST, ALT, BILIDIR, BILITOT, ALKPHOS in the last 72 hours. No results for input(s): INR in the last 72 hours. No results for input(s): Luis Leaks in the last 72 hours. Urinalysis:    Lab Results   Component Value Date    NITRU NEGATIVE 09/27/2017    WBCUA 2-4 09/27/2017    BACTERIA FEW 09/27/2017    RBCUA 5-10 09/27/2017    BLOODU MODERATE 09/27/2017    SPECGRAV 1.028 09/22/2017    GLUCOSEU NEGATIVE 09/27/2017       Radiology:  XR ABDOMEN (KUB) (SINGLE AP VIEW)   Final Result   1. Findings are consistent with constipation. 2.  The distribution and amount of gas present does not suggest a bowel obstruction at this time. *This report has been created using voice recognition software. It may contain minor errors which are inherent in voice recognition technology. **      Final report electronically signed by Dr. Olivia Singletary on 9/29/2017 11:35 AM      XR LUMBAR SPINE (2-3 VIEWS)   Final Result   1. No acute fractures or acute subluxations. 2.  Mild degenerative changes, and scoliosis as described. **This report has been created using voice recognition software. It may contain minor errors which are inherent in voice recognition technology. **      Final report electronically signed by Dr. Olivia Singletary on 9/25/2017 11:08 AM      XR KNEE RIGHT (MIN 4 VIEWS)   Final Result    Normal-appearing 4 views of the knee. **This report has been created using voice recognition software. It may contain minor errors which are inherent in voice recognition technology. **      Final report electronically signed by Dr. Olivia Singletary on 9/25/2017 11:05 AM      XR PELVIS (1-2 VW)   Final Result   1. No acute fractures or dislocations. 2.  Dense vascular calcifications. .               **This report has been created using voice recognition software.  It may contain minor errors kidney inferiorly. 5.  Moderate diverticulosis of the colon which looks unchanged and shows no acute process associated with it. **This report has been created using voice recognition software. It may contain minor errors which are inherent in voice recognition technology. **      Final report electronically signed by Dr. Hany Bay on 9/22/2017 5:12 PM      CTA CHEST [address]   Final Result   1. No pulmonary emboli are seen. . Questionable pulmonary arterial hypertension. 2. Large hiatus hernia. Moderately severe distention of the stomach with fluid and air. 3. Mild infiltrates in both lungs. See comments above. **This report has been created using voice recognition software. It may contain minor errors which are inherent in voice recognition technology. **          Final report electronically signed by Dr. Trish Vickers on 9/22/2017 4:58 PM      XR Chest Portable   Final Result   Large fixed hiatal hernia. No definite acute findings. **This report has been created using voice recognition software. It may contain minor errors which are inherent in voice recognition technology. **      Final report electronically signed by Dr. Hui Ohara on 9/22/2017 12:53 PM          Diet: DIET CLEAR LIQUID;    DVT prophylaxis: [] Lovenox                                 [x] SCDs                                 [] SQ Heparin                                 [] Encourage ambulation           [] Already on Anticoagulation     Disposition:    [] Home       [] TCU       [] Rehab       [] Psych       [x] SNF       [] [place]       [] Other-    Code Status: DNR-CCA    PT/OT Eval Status: current    Assessment/Plan:    Anticipated Discharge in : Mon/tues    Active Hospital Problems    Diagnosis Date Noted    Hiatus hernia with compression intrathoracic [K44.0] 09/22/2017     Priority: High     Class: Acute    Chest pain [R07.9] 09/22/2017     Priority: High     Class: Acute to  ml;  reports she would go up to 39 hours without voiding at home but would go a large amount when she would finally void; will need to be discharged with russell and f/u with urology 1 week after discharge  11. Large hiatal hernia--per CT and EGD; cont PPI;  has been educated on keeping patient sitting up when eating and 45 min post; HOB 30 degrees  12. Alzheimer's dementia with behavior disturbance--has been uncooperative and combative off and on but more cooperative as of lately; Psych; cont Aricept, Abilify, Effexor; Xanax prn; SNF at discharge  13. KUMAR--resloved; creat 1.3 on 9/24 and now back to baseline  14. Anorexia--related to dementia; Marinol stopped due to potential cause of N/V; encourage po intake; dietician following  15. Hypokalemia--resolved  16. Metabolic acidosis--resolved  17. Lumbar foraminal stenosis--L4-S1; chronic; PT/OT; no c/o back pain  18.  Hyponatremia--had been resolved but now 131; monitor; no fluid restriction due to only CL diet and she is not taking in that much already; BMP am    Dispo: BMP, h/h am; discharge mon/tues if stable; possibly increase to [facility] diet today if OK with GI; decrease, atenolol to 50 mg daily    Electronically signed by Geraldine Eisenmenger Case, NP on 10/1/2017 at 6:36 AM

## 2017-10-01 NOTE — PROGRESS NOTES
Hospital Follow Up Note  CHUN   S no gi bleed  Family presernt      Current Facility-Administered Medications   Medication Dose Route Frequency Provider Last Rate Last Dose    [START ON 10/2/2017] atenolol (TENORMIN) tablet 50 mg  50 mg Oral Daily Marcos Dickson NP        ferrous gluconate (FERGON) tablet 360 mg  360 mg Oral TID  Clay Batista MD   360 mg at 10/01/17 0831    pantoprazole (PROTONIX) tablet 40 mg  40 mg Oral BID AC Marcos Dickson NP   40 mg at 10/01/17 0508    sodium chloride (PF) 0.9 % injection 10 mL  10 mL Intravenous 2 times per day Lulú Hurst CNP        sodium chloride (PF) 0.9 % injection 10 mL  10 mL Intravenous PRN Lulú Hurst CNP        sucralfate (CARAFATE) 1 GM/10ML suspension 1 g  1 g Oral 4 times per day Lulú Hurst CNP   1 g at 10/01/17 0508    hydrALAZINE (APRESOLINE) tablet 50 mg  50 mg Oral BID PRN Rajwinder Elif, DO        ondansetron [facility]) injection 4 mg  4 mg Intramuscular Q6H PRN Clay Batista MD   4 mg at 09/28/17 1203    hydrALAZINE (APRESOLINE) injection 10 mg  10 mg Intramuscular Q4H PRN Clay Batista MD   10 mg at 09/30/17 0555    lidocaine PF 1 % injection 5 mL  5 mL Intradermal Once Clay Batista MD        sodium chloride flush 0.9 % injection 10 mL  10 mL Intravenous 2 times per day Clay Batista MD        sodium chloride flush 0.9 % injection 10 mL  10 mL Intravenous PRN Clay Batista MD        glycopyrrolate (ROBINUL) tablet 1 mg  1 mg Oral BID [place] Elif, DO   1 mg at 10/01/17 0831    bisacodyl (DULCOLAX) suppository 10 mg  10 mg Rectal Daily PRN Monalisa Bryan PA-C   10 mg at 09/28/17 2211    potassium replacement protocol   Other RX Placeholder Monalisa Bryan PA-C        influenza quadrivalent split vaccine (FLUZONE;FLUARIX) injection 0.5 mL  0.5 mL Intramuscular Once Lily Kim MD        ALPRAZolam Fernando Willis) tablet 0.5 mg  0.5 mg Oral Q8H PRN Monalisa Bryan PA-C   0.5 mg at 10/01/17 1006    donepezil AST, PROT, BILITOT, BILIDIR, LABALBU in the last 72 hours. Amylase and Lipase:No results for input(s): LIPASE, AMYLASE in the last 72 hours. Lactic Acid: No results for input(s): LACTA in the last 72 hours. Troponin: No results for input(s): CKTOTAL, CKMB, TROPONINI in the last 72 hours. Lipids: No results for input(s): CHOL, TRIG, HDL, LDLCALC in the last 72 hours. Invalid input(s): LDL  PT/INR:   No results for input(s): PROTIME, INR in the last 72 hours. PTT:   No results for input(s): APTT, PTT in the last 72 hours.      A. Pt with diffuse gi dysmotility  No appetite  Hiatal hernia  Erosive esophagitis        P. ppi  Increase diet as tolerated

## 2017-10-01 NOTE — PLAN OF CARE
Problem: Cardiac Output - Decreased:  Goal: Hemodynamic stability will improve  Hemodynamic stability will improve   Outcome: Ongoing  Vitals monitored and recorded. Patient hemodynamically stable. Cardiac monitor in place with strips being read every four hours. Problem: Tissue Perfusion - Cardiopulmonary, Altered:  Goal: Absence of angina  Absence of angina   Outcome: Ongoing  Patient free from angina this shift. Problem: Falls - Risk of:  Goal: Will remain free from falls  Will remain free from falls   Outcome: Ongoing  Patient free of falls this shift. Patient on falling star program. Fall band intact. RN visually checks on patient with hourly rounds. Patient tolerates ambulation each shift. Problem: Pain:  Goal: Pain level will decrease  Pain level will decrease   Outcome: Ongoing  Patient has no c/o pain this shift.

## 2017-10-02 LAB
ANION GAP SERPL CALCULATED.3IONS-SCNC: 10 MEQ/L (ref 8–16)
BUN BLDV-MCNC: 17 MG/DL (ref 7–22)
CALCIUM SERPL-MCNC: 8.3 MG/DL (ref 8.5–10.5)
CHLORIDE BLD-SCNC: 97 MEQ/L (ref 98–111)
CO2: 26 MEQ/L (ref 23–33)
CREAT SERPL-MCNC: 0.8 MG/DL (ref 0.4–1.2)
GFR SERPL CREATININE-BSD FRML MDRD: 69 ML/MIN/1.73M2
GLUCOSE BLD-MCNC: 83 MG/DL (ref 70–108)
HCT VFR BLD CALC: 28.4 % (ref 37–47)
HEMOGLOBIN: 10 GM/DL (ref 12–16)
POTASSIUM SERPL-SCNC: 3.5 MEQ/L (ref 3.5–5.2)
SODIUM BLD-SCNC: 133 MEQ/L (ref 135–145)

## 2017-10-02 PROCEDURE — 6370000000 HC RX 637 (ALT 250 FOR IP): Performed by: NURSE PRACTITIONER

## 2017-10-02 PROCEDURE — 1200000003 HC TELEMETRY R&B

## 2017-10-02 PROCEDURE — 80048 BASIC METABOLIC PNL TOTAL CA: CPT

## 2017-10-02 PROCEDURE — 85018 HEMOGLOBIN: CPT

## 2017-10-02 PROCEDURE — 99232 SBSQ HOSP IP/OBS MODERATE 35: CPT | Performed by: PHYSICIAN ASSISTANT

## 2017-10-02 PROCEDURE — 85014 HEMATOCRIT: CPT

## 2017-10-02 PROCEDURE — 36415 COLL VENOUS BLD VENIPUNCTURE: CPT

## 2017-10-02 PROCEDURE — 6370000000 HC RX 637 (ALT 250 FOR IP): Performed by: INTERNAL MEDICINE

## 2017-10-02 PROCEDURE — 97530 THERAPEUTIC ACTIVITIES: CPT

## 2017-10-02 PROCEDURE — 6370000000 HC RX 637 (ALT 250 FOR IP): Performed by: PHYSICIAN ASSISTANT

## 2017-10-02 PROCEDURE — 97110 THERAPEUTIC EXERCISES: CPT

## 2017-10-02 RX ORDER — LOPERAMIDE HYDROCHLORIDE 2 MG/1
2 CAPSULE ORAL NIGHTLY PRN
Status: DISCONTINUED | OUTPATIENT
Start: 2017-10-02 | End: 2017-10-04 | Stop reason: HOSPADM

## 2017-10-02 RX ORDER — TAMSULOSIN HYDROCHLORIDE 0.4 MG/1
0.4 CAPSULE ORAL DAILY
Status: DISCONTINUED | OUTPATIENT
Start: 2017-10-02 | End: 2017-10-04

## 2017-10-02 RX ADMIN — VENLAFAXINE HYDROCHLORIDE 75 MG: 75 CAPSULE, EXTENDED RELEASE ORAL at 08:09

## 2017-10-02 RX ADMIN — PANTOPRAZOLE SODIUM 40 MG: 40 TABLET, DELAYED RELEASE ORAL at 16:41

## 2017-10-02 RX ADMIN — SUCRALFATE 1 G: 1 SUSPENSION ORAL at 06:56

## 2017-10-02 RX ADMIN — ARIPIPRAZOLE 2 MG: 2 TABLET ORAL at 22:08

## 2017-10-02 RX ADMIN — TAMSULOSIN HYDROCHLORIDE 0.4 MG: 0.4 CAPSULE ORAL at 14:13

## 2017-10-02 RX ADMIN — ATENOLOL 50 MG: 100 TABLET ORAL at 08:46

## 2017-10-02 RX ADMIN — ALPRAZOLAM 0.5 MG: 0.5 TABLET ORAL at 08:17

## 2017-10-02 RX ADMIN — VENLAFAXINE HYDROCHLORIDE 75 MG: 75 CAPSULE, EXTENDED RELEASE ORAL at 22:11

## 2017-10-02 RX ADMIN — SUCRALFATE 1 G: 1 SUSPENSION ORAL at 16:57

## 2017-10-02 RX ADMIN — ARIPIPRAZOLE 2 MG: 2 TABLET ORAL at 08:08

## 2017-10-02 RX ADMIN — GLYCOPYRROLATE 1 MG: 1 TABLET ORAL at 08:47

## 2017-10-02 RX ADMIN — DONEPEZIL HYDROCHLORIDE 20 MG: 10 TABLET, FILM COATED ORAL at 08:46

## 2017-10-02 RX ADMIN — SUCRALFATE 1 G: 1 SUSPENSION ORAL at 00:12

## 2017-10-02 RX ADMIN — SUCRALFATE 1 G: 1 SUSPENSION ORAL at 13:57

## 2017-10-02 ASSESSMENT — PAIN SCALES - GENERAL
PAINLEVEL_OUTOF10: 0

## 2017-10-02 NOTE — PLAN OF CARE
Problem: Discharge Planning:  Goal: Discharged to appropriate level of care  Discharged to appropriate level of care   Outcome: Ongoing  Patient plans to go to [facility] at discharge. Patient has need for physical and occupational therapy at discharge. Problem: Cardiac Output - Decreased:  Goal: Hemodynamic stability will improve  Hemodynamic stability will improve   Outcome: Ongoing  Monitoring vitals and labs frequently. Updating physician with any abnormal results and replacing electrolytes per protocol. Problem: Tissue Perfusion - Cardiopulmonary, Altered:  Goal: Absence of angina  Absence of angina   Outcome: Ongoing  Patient chest pain free this shift. RN monitors locations, characteristics, radiation and strength of any occurring chest pain. PRN EKG ordered for any chest pain occurrence. Problem: Safety:  Goal: Ability to chew and swallow food without choking will improve  Ability to chew and swallow food without choking will improve   Outcome: Ongoing  Patients head of bed up at 30 degrees. Patient up in sitting position while eating. Patient swallowed morning pills with no signs of aspiration. RN to continue to monitor. Problem: Nutrition  Goal: Optimal nutrition therapy  Outcome: Ongoing  Patient is tolerating dental soft diet with Ensure supplement. Monitoring intake and output. Bowel sounds active in all four quadrants. Problem: Falls - Risk of:  Goal: Will remain free from falls  Will remain free from falls   Outcome: Ongoing  Patient absent of falls this shift. Patient assisted with ambulation. Patients call light within reach, fall band on, non-skid socks, fall sign posted on door, and patient educated to not get up per self to reduce the risk of falls. Bed and chair alarms being used. Problem: Pain:  Goal: Pain level will decrease  Pain level will decrease   Outcome: Ongoing  Patient complains of pain 0/10. Patients goal for pain is 0/10. PRN medications given as needed. Non-pharmacological methods such as heat, repositioning patient, and calm environment provided. Goal: Control of acute pain  Control of acute pain   Outcome: Ongoing  Patient complains of no acute pain at this time. Goal: Control of chronic pain  Control of chronic pain   Outcome: Ongoing  Patient complains of no chronic pain at this time. Comments:   Care plan reviewed with patient and .  verbalize understanding of the plan of care and contribute to goal setting.

## 2017-10-02 NOTE — PROGRESS NOTES
resting in bed this am, awake and alert and oriented to self, her  Srinivasan Muñiz, what town she is in. Her  is at the bedside. Patient offers no complaints. Denies nausea, vomiting, chest pain, back pain. Her  reports she did not sleep much last night and was confused earlier today, but appears improved now. She is tolerating soft diet, drinking milk during examination. Medications:  Reviewed    Infusion Medications    Scheduled Medications    atenolol  50 mg Oral Daily    ferrous gluconate  360 mg Oral TID WC    pantoprazole  40 mg Oral BID AC    sodium chloride (PF)  10 mL Intravenous 2 times per day    sucralfate  1 g Oral 4 times per day    lidocaine PF  5 mL Intradermal Once    sodium chloride flush  10 mL Intravenous 2 times per day    glycopyrrolate  1 mg Oral BID    potassium replacement protocol   Other RX Placeholder    influenza virus vaccine  0.5 mL Intramuscular Once    donepezil  20 mg Oral Daily    ARIPiprazole  2 mg Oral BID    venlafaxine  75 mg Oral BID    loperamide  2 mg Oral Nightly     PRN Meds: sodium chloride (PF), hydrALAZINE, ondansetron, hydrALAZINE, sodium chloride flush, bisacodyl, ALPRAZolam, ipratropium-albuterol, acetaminophen      Intake/Output Summary (Last 24 hours) at 10/02/17 0726  Last data filed at 10/02/17 0500   Gross per 24 hour   Intake              690 ml   Output              625 ml   Net               65 ml       Diet:  DIET DENTAL SOFT;    Exam:  /63  Pulse 56  Temp 97.4 °F (36.3 °C) (Oral)   Resp 18  Ht 5' 1\" (1.549 m)  Wt 212 lb (96.2 kg)  SpO2 95%  BMI 40.06 kg/m2    General appearance: No apparent distress, elderly, confused, sitting up in bed, and cooperative. HEENT: Pupils equal, round, and reactive to light. Conjunctivae/corneas clear. Oral mucosa moist, drinks from straw without difficulty  Neck: Supple, with full range of motion. No jugular venous distention. Trachea midline.    Respiratory:  Normal respiratory electronically signed by Dr. Quin Bills on 9/22/2017 10:18 PM      CT THORACIC RECONSTRUCTION WO POST PROCESS   Final Result   1. No acute fractures or acute subluxations. 2.  Minimal degenerative changes. 3.  No obvious discogenic acute processes or disc bulges. CT is less sensitive than MRI for disc pathology. **This report has been created using voice recognition software. It may contain minor errors which are inherent in voice recognition technology. **      Final report electronically signed by Dr. Quin Bills on 9/22/2017 10:15 PM      CT ABDOMEN PELVIS W IV CONTRAST Additional Contrast? None   Final Result   1. Very large fluid-filled hiatal hernia. The intra-abdominal portion of the stomach is distended with a large gas fluid level. 2.  No bowel obstruction. 3.  Increase in the size of the left ovarian cyst. In the postmenopausal patient systems this side is almost certainly benign but should be followed annually with ultrasound. 4.  Stable small likely simple cyst in the left kidney inferiorly. 5.  Moderate diverticulosis of the colon which looks unchanged and shows no acute process associated with it. **This report has been created using voice recognition software. It may contain minor errors which are inherent in voice recognition technology. **      Final report electronically signed by Dr. Quin Bills on 9/22/2017 5:12 PM      CTA CHEST [address]   Final Result   1. No pulmonary emboli are seen. . Questionable pulmonary arterial hypertension. 2. Large hiatus hernia. Moderately severe distention of the stomach with fluid and air. 3. Mild infiltrates in both lungs. See comments above. **This report has been created using voice recognition software. It may contain minor errors which are inherent in voice recognition technology. **          Final report electronically signed by Dr. Jayson Avendano on 9/22/2017 4:58 PM      XR Chest Portable   Final Result   Large fixed hiatal hernia. No definite acute findings. **This report has been created using voice recognition software. It may contain minor errors which are inherent in voice recognition technology. **      Final report electronically signed by Dr. Maxi Saul on 9/22/2017 12:53 PM          Diet: DIET DENTAL SOFT;    DVT prophylaxis: [x] Lovenox                                 [] SCDs                                 [] SQ Heparin                                 [] Encourage ambulation           [] Already on Anticoagulation     Disposition:    [] Home       [] TCU       [] Rehab       [] Psych       [x] SNF       [] [place]       [] Other-    Code Status: DNR-CCA    PT/OT Eval Status:  active and ongoing      Assessment/Plan:    Anticipated Discharge in : pending ECF placement    Active Hospital Problems    Diagnosis Date Noted    Hiatus hernia with compression intrathoracic [K44.0] 09/22/2017     Priority: High     Class: Acute    Chest pain [R07.9] 09/22/2017     Priority: High     Class: Acute    Spinal stenosis at L4-L5 level  with left leg weakness and urinary retention [M48.061] 09/22/2017     Priority: High     Class: Acute    Weakness of left lower extremity  spinal stenosis  or cva [R29.898] 09/22/2017     Priority: High     Class: Acute    Urinary retention  neurogenic bladder [R33.9] 09/22/2017     Priority: High     Class: Chronic    Alzheimer's dementia with behavioral disturbance [G30.8, F02.81] 09/22/2017     Priority: High     Class: Acute    Aspiration pneumonia ([address]  bilateral  from large hiatus hernia [J69.0] 09/22/2017     Priority: High     Class: Acute    Fever [R50.9]     Dehydration [E86.0]        1. Possible Aspiration Pneumonia (POA)--resolved. CTA chest 9/22: mild atelectasis/pna right posterior costohrenic sulcus. Initially febrile with leukocytosis. Tx IV Azithromycin, IV Levaquin in ED. Procal 0.07, 0.06, 0.05.  Repeat CXR on 9/25: no acute process. Treated IV Zosyn 9/23-9/24. No cough, afebrile, WBC 9.6 on 10/1.   2. SIRS (POA)--resolved. In ED WBC 15.0, febrile tmax 100.9. Flu A/B (-), Blood cultures no growth. U/A (-), CXR/CTA as in #1. Procalc 0.07, 0.06, 0.05, lactic acid 1.0. Afebrile, WBC 9.6 on 10/1.   3. Large Hiatal Hernia--noted on CT/EGD. Continue PPI, sitting up after eating for at least 45 minutes. GI following. 4. Nausea and Vomiting--resolved. Likely due to #5, 6. Continue prn Zofran. Tolerating soft diet  5. GI Dysmotility--on EGD had old food and liquid. Likely contributing to #4. GI following, dental soft diet. 6. Erosive Esophagitis--noted on EGD on 9/30 with Dr. Freddie Romero. Continue ppi, carafate BID. GI following  7. Generalized Weakness--related to dementia, poor appetite. Continue PT/OT  8. Acute Blood Loss Anemia--Hgb 10.0, stable. Iron low on 9/30. Per GI due to erosive esophagitis. No active bleeding on EGD. Continue ppi, ferrous gluconate. Continue to monitor. 9. Hyponatremia--improving, up to 133 from 131 on 10/1. Likely due to poor oral intake. Continue to encourage diet, monitor. 10. Hypokalemia--3.5 on 10/2. Due to poor oral intake. Replace per protocol. 11. Bradycardia--improved, HR 40-50's on 9/30-10/1; now high 50-60's. Dose of Atenolol decreased to 50 mg daily with improvement. 12. Chest Pain--resolved. Possibly musculoskeletal from coughing. Troponin (-)x3, BNP 1048.0, CK 82. CTA (-) for PE, aneurysm, dissection. 13. Essential Hypertension, controlled--improved. Continue hydralazine, atenolol   14. Urinary Retention--russell placed 9/27 for PVR >450 mL. Discussed with patient, , will have urology follow up after discharge. Discussed with Dr. Tori Ortiz; Will discontinue Robinul for possible retention side effects, begin Flomax. Plan to remove russell on 10/3. 15. Alzheimer's Dementia--appreciate psych input. Continue Aricept, Abilify, Effexor. PRN Xanax. To SNF at discharge, pre-cert in process. 16. Anorexia--was started on Marinol, dc'd due to possible worsening of N/V. Dietician following, last assessment at risk for malnutrition. Continue ensure. 17. KUMAR--resolved. Cr 0.8 consistent. had Cr 1.3 on 9/24. Felt to be due to dehydration. 18. Metabolic Acidosis--resolved   19. Lumbar Degenerative Changes--chronic changes noted on CT of Lspine-moderate-severe right foraminal stenosis, L5-S1 loss of disc heigh. Continue PT/OT. Dispo: Spoke with Roseanne Lujan, , pre-cert ran out over the weekend and is to be restarted today.  Follow    I have discussed this patient's care and plan with Dr. Kyra Garcia    Electronically signed by Mandy Baum PA-C on 10/2/2017 at 7:26 AM

## 2017-10-02 NOTE — PROGRESS NOTES
Hospital Follow Up Note  CHUN Gamboa 1940   10/2/2017 10:44 AM  S. Patient drowsy, in bed. Family at bedside. Questions answered          O.    Vitals:    10/02/17 0801   BP: 129/63   Pulse: 60   Resp: 18   Temp: 97.8 °F (36.6 °C)   SpO2: 95%            Drowsy          Heart  RRR         Lungs CTAB          ABD S/NT/ND/+BS         Ext No LLE     A. Hematemesis   Anorexia   EGD 9-30-17 Erosive esophagitis. LAC grade A probably from retching. Large hiatal hernia. Old fluid and dilated stomach. Gastric erosions, biopsy to rule out HP disease. PATH PENDING. Recommendations this is GI dysmotility. Small bowel obstruction is Not clinically there but there might be small bowel dysmotility as well. Bleeding and iron deficiency anemia is from erosive esophagitis which is secondary from retching. Conservative care due to advance dementia is advised. Carafate qid / Pantoprazole bid      Dementia   Weakness, deconditioning   Speech language evaluation 9-27-17 normal OP function with no overt aspiration of solids or liquids. Improved mentation playing a large role in improved swallowing function. Regular diet with thin liquids. Anemia without overt GI bleeding  Iron deficiency  Oral iron supplementation tid  FOBT positive   History IBS   Diarrhea, GI pathogen panel  9-29-17 negative         P.  Low residue diet   Consult dietician to discuss with family   Follow path results   Will follow along       A&P discussed with Dr Tom Sood, CNP

## 2017-10-02 NOTE — PROGRESS NOTES
Physical Therapy   [facility]  INPATIENT PHYSICAL THERAPY  [facility]  [facility] MED SURG 8B    Time In: 7928  Time Out: 8052  Timed Code Treatment Minutes: 15 Minutes  Minutes: 15          Date: 10/2/2017  Patient Name: Brandin Louis,  Gender:  female        MRN: [id number]  : 1940  (65 y.o.)  Referral Date : 17  Referring Practitioner: Dena Basurto PA-C  Diagnosis: chest pain  Additional Pertinent Hx:  Brandin Louis is a 65 y.o. female with a past medical history of Dementia, GERD, Hyperlipidemia, and Hypertension who presents to the ED vis EMS for evaluation of shortness of breath. Per family, the patient has been complaining of feeling short of breath at night, particularly when laying flat. Family has noticed that when the patient is laying down to sleep she seems to be working harder to breath. These symptoms improve when the patient is placed into an upright seated position. She does not have any known lung diseases or history of CHF. The patient denies feeling short of breath at this time, but does report having occasional chest pain. Family also states that the patient had nausea and emesis two days ago, which has since resolved however the patient had been gagging more often when taking her medications. The patient does not use any chronic home O2 support. She is not a smoker. No additional complaints or concerns at the time of initial evaluation.       Past Medical History:   Diagnosis Date    Aspiration pneumonia (HCC)  bilateral  from large hiatus hernia 2017    Dementia     Depression     GERD (gastroesophageal reflux disease)     Hyperlipidemia     Hypertension      Past Surgical History:   Procedure Laterality Date    CHOLECYSTECTOMY      COLONOSCOPY      [place] ESOPHAGOGASTRODUODENOSCOPY TRANSORAL DIAGNOSTIC N/A 2017    EGD ESOPHAGOGASTRODUODENOSCOPY performed by Pardeep Godinez MD at [facility] them resume and deliver wheelchair to home    Safety:  Type of devices: All fall risk precautions in place, Patient at risk for falls, Nurse notified, Call light within reach, Left in bed, Bed alarm in place    Plan:  Times per week: 3-5x GM  Specific instructions for Next Treatment: strengthening, sitting and standing balance, gait training, safety at home  Current Treatment Recommendations: Strengthening, Balance Training, Functional Mobility Training, Transfer Training, Endurance Training, Gait Training, Stair training, Home Exercise Program, Safety Education & Training, Equipment Evaluation, Education, & procurement, Patient/Caregiver Education & Training    Goals:  Patient goals : not stated    Short term goals  Time Frame for Short term goals: 1 week  Short term goal 1: patient to perform bed mobility at [facility] to get in and out of bed  Short term goal 2: patient to perform transfers from various surfaces at SBA and A to rise from bed or chair  Short term goal 3: patient to ambulate 75ft with HHA at [place] [id number] for household mobility  Short term goal 4: patient to negotiate 2 steps with one rail and HHA at [facility] for home access    Long term goals  Time Frame for Long term goals : defer d/t short ELOS    PT G-Codes  Functional Assessment Tool Used: Professional Judgement  Functional Limitation: Mobility: Walking and moving around  Mobility: Walking and Moving Around Current Status (): At least 1 percent but less than 20 percent impaired, limited or restricted  Mobility: Walking and Moving Around Goal Status ():  At least 1 percent but less than 20 percent impaired, limited or restricted

## 2017-10-02 NOTE — PROGRESS NOTES
Nutrition Assessment    Type and Reason for Visit: Reassess    Nutrition Recommendations: Continue with PO diet as ordered. Will add Ensure BID and Magic Cup 1/d to trial for additional calories/protein. Continue to monitor closely for safe swallow. Noted new consult to RD for gastroparesis per EGD. Diet adjustment noted to low residue. Provided verbal education to pt spouse, noted plans for facility at discharge. Malnutrition Assessment:  Malnutrition Status: At risk for malnutrition  Nutrition Diagnosis:   · Problem: Inadequate oral intake  · Etiology: related to Cognitive or neurological impairment     Signs and symptoms:  as evidenced by Intake 0-25%    Nutrition Assessment:  · Subjective Assessment: Follow up for oral intake and PO diet advancement. Pt sleeping at time of RD visit, spouse at bedside. Discussed intake with pt spouse, reports she west a few bites of breakfast this AM.  Reports she likes Vanilla Ensure recently, he was agreeable to resuming that and encouraging supplements. Willing to trial [employer] 1/d. · Current Nutrition Therapies:  · Oral Diet Orders: Dental Soft   · Oral Diet intake: 1-25%  · Anthropometric Measures:  · Ht: 5' 1\" (154.9 cm)   · Current Body Wt: 212 lb (96.2 kg) (10/2/17)  · Admission Body Wt: 198 lb (89.8 kg) (9/23 with no edema)  · Usual Body Wt: 189 lb (85.7 kg) (8/25/16 per EMR)  · % Weight Change:  ,     · Ideal Body Wt: 105 lb (47.6 kg), % Ideal Body 185%  · Adjusted Body Wt: 128 lb (58.1 kg), body weight adjusted for Obesity  · BMI Classification: BMI 35.0 - 39.9 Obese Class II (36.9)  · Comparative Standards (Estimated Nutrition Needs):  · Estimated Daily Total Kcal: 2806-3581  · Estimated Daily Protein (g): 70-75 grams    Estimated Intake vs Estimated Needs: Intake improving- diet recently advanced    Nutrition Risk Level:  Moderate    Nutrition Interventions:   Continue current diet, Start ONS  Continued Inpatient Monitoring, Education Initiated
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
## 2017-10-03 ENCOUNTER — APPOINTMENT (OUTPATIENT)
Dept: CT IMAGING | Age: 77
DRG: 177 | End: 2017-10-03
Payer: MEDICARE

## 2017-10-03 LAB
ANION GAP SERPL CALCULATED.3IONS-SCNC: 13 MEQ/L (ref 8–16)
BUN BLDV-MCNC: 15 MG/DL (ref 7–22)
CALCIUM IONIZED: 1.12 MMOL/L (ref 1.12–1.32)
CALCIUM SERPL-MCNC: 8.6 MG/DL (ref 8.5–10.5)
CHLORIDE BLD-SCNC: 99 MEQ/L (ref 98–111)
CO2: 25 MEQ/L (ref 23–33)
CREAT SERPL-MCNC: 0.8 MG/DL (ref 0.4–1.2)
GFR SERPL CREATININE-BSD FRML MDRD: 69 ML/MIN/1.73M2
GLUCOSE BLD-MCNC: 91 MG/DL (ref 70–108)
HCT VFR BLD CALC: 29.7 % (ref 37–47)
HEMOGLOBIN: 10.3 GM/DL (ref 12–16)
MAGNESIUM: 2.3 MG/DL (ref 1.6–2.4)
MCH RBC QN AUTO: 29 PG (ref 27–31)
MCHC RBC AUTO-ENTMCNC: 34.8 GM/DL (ref 33–37)
MCV RBC AUTO: 83.6 FL (ref 81–99)
PDW BLD-RTO: 16.5 % (ref 11.5–14.5)
PLATELET # BLD: 333 THOU/MM3 (ref 130–400)
PMV BLD AUTO: 7 MCM (ref 7.4–10.4)
POTASSIUM SERPL-SCNC: 3.9 MEQ/L (ref 3.5–5.2)
RBC # BLD: 3.55 MILL/MM3 (ref 4.2–5.4)
SODIUM BLD-SCNC: 137 MEQ/L (ref 135–145)
WBC # BLD: 8.9 THOU/MM3 (ref 4.8–10.8)

## 2017-10-03 PROCEDURE — 6370000000 HC RX 637 (ALT 250 FOR IP): Performed by: INTERNAL MEDICINE

## 2017-10-03 PROCEDURE — 97530 THERAPEUTIC ACTIVITIES: CPT

## 2017-10-03 PROCEDURE — 80048 BASIC METABOLIC PNL TOTAL CA: CPT

## 2017-10-03 PROCEDURE — 36415 COLL VENOUS BLD VENIPUNCTURE: CPT

## 2017-10-03 PROCEDURE — 51798 US URINE CAPACITY MEASURE: CPT

## 2017-10-03 PROCEDURE — 70450 CT HEAD/BRAIN W/O DYE: CPT

## 2017-10-03 PROCEDURE — 82330 ASSAY OF CALCIUM: CPT

## 2017-10-03 PROCEDURE — 6370000000 HC RX 637 (ALT 250 FOR IP): Performed by: PHYSICIAN ASSISTANT

## 2017-10-03 PROCEDURE — 99222 1ST HOSP IP/OBS MODERATE 55: CPT | Performed by: PSYCHIATRY & NEUROLOGY

## 2017-10-03 PROCEDURE — 83735 ASSAY OF MAGNESIUM: CPT

## 2017-10-03 PROCEDURE — 6370000000 HC RX 637 (ALT 250 FOR IP): Performed by: NURSE PRACTITIONER

## 2017-10-03 PROCEDURE — 6370000000 HC RX 637 (ALT 250 FOR IP): Performed by: ANESTHESIOLOGY

## 2017-10-03 PROCEDURE — 1200000003 HC TELEMETRY R&B

## 2017-10-03 PROCEDURE — 85027 COMPLETE CBC AUTOMATED: CPT

## 2017-10-03 PROCEDURE — 99233 SBSQ HOSP IP/OBS HIGH 50: CPT | Performed by: PHYSICIAN ASSISTANT

## 2017-10-03 RX ORDER — SUCRALFATE ORAL 1 G/10ML
1 SUSPENSION ORAL
Qty: 1200 ML | Refills: 1
Start: 2017-10-03 | End: 2017-10-04

## 2017-10-03 RX ORDER — ATORVASTATIN CALCIUM 40 MG/1
40 TABLET, FILM COATED ORAL DAILY
Status: DISCONTINUED | OUTPATIENT
Start: 2017-10-03 | End: 2017-10-04 | Stop reason: HOSPADM

## 2017-10-03 RX ORDER — DONEPEZIL HYDROCHLORIDE 10 MG/1
10 TABLET, FILM COATED ORAL DAILY
Status: DISCONTINUED | OUTPATIENT
Start: 2017-10-04 | End: 2017-10-03

## 2017-10-03 RX ORDER — DONEPEZIL HYDROCHLORIDE 10 MG/1
20 TABLET, FILM COATED ORAL EVERY MORNING
Status: DISCONTINUED | OUTPATIENT
Start: 2017-10-04 | End: 2017-10-04 | Stop reason: HOSPADM

## 2017-10-03 RX ORDER — RISPERIDONE 0.25 MG/1
0.25 TABLET, FILM COATED ORAL 2 TIMES DAILY
Status: DISCONTINUED | OUTPATIENT
Start: 2017-10-03 | End: 2017-10-04 | Stop reason: HOSPADM

## 2017-10-03 RX ORDER — VENLAFAXINE HYDROCHLORIDE 75 MG/1
75 CAPSULE, EXTENDED RELEASE ORAL
Status: DISCONTINUED | OUTPATIENT
Start: 2017-10-04 | End: 2017-10-04 | Stop reason: HOSPADM

## 2017-10-03 RX ORDER — PANTOPRAZOLE SODIUM 40 MG/1
40 TABLET, DELAYED RELEASE ORAL
Qty: 60 TABLET | Refills: 11
Start: 2017-10-03 | End: 2017-10-04

## 2017-10-03 RX ADMIN — SUCRALFATE 1 G: 1 SUSPENSION ORAL at 01:16

## 2017-10-03 RX ADMIN — ARIPIPRAZOLE 2 MG: 2 TABLET ORAL at 09:02

## 2017-10-03 RX ADMIN — ATENOLOL 50 MG: 100 TABLET ORAL at 09:05

## 2017-10-03 RX ADMIN — ALPRAZOLAM 0.5 MG: 0.5 TABLET ORAL at 08:59

## 2017-10-03 RX ADMIN — SUCRALFATE 1 G: 1 SUSPENSION ORAL at 05:31

## 2017-10-03 RX ADMIN — RISPERIDONE 0.25 MG: 0.25 TABLET ORAL at 21:29

## 2017-10-03 RX ADMIN — Medication 360 MG: at 16:47

## 2017-10-03 RX ADMIN — VENLAFAXINE HYDROCHLORIDE 75 MG: 75 CAPSULE, EXTENDED RELEASE ORAL at 08:59

## 2017-10-03 RX ADMIN — Medication 360 MG: at 09:04

## 2017-10-03 RX ADMIN — PANTOPRAZOLE SODIUM 40 MG: 40 TABLET, DELAYED RELEASE ORAL at 16:48

## 2017-10-03 RX ADMIN — SUCRALFATE 1 G: 1 SUSPENSION ORAL at 16:47

## 2017-10-03 RX ADMIN — DONEPEZIL HYDROCHLORIDE 20 MG: 10 TABLET, FILM COATED ORAL at 09:02

## 2017-10-03 RX ADMIN — TAMSULOSIN HYDROCHLORIDE 0.4 MG: 0.4 CAPSULE ORAL at 09:05

## 2017-10-03 ASSESSMENT — PAIN SCALES - GENERAL
PAINLEVEL_OUTOF10: 0

## 2017-10-03 NOTE — PROGRESS NOTES
[address] [address]  INPATIENT PHYSICAL THERAPY  DAILY NOTE  STRZ MED SURG 8B    Time In: 831  Time Out: 6379  Timed Code Treatment Minutes: 24 Minutes  Minutes: 24             Date: 10/3/2017  Patient Name: Therese Alba,  Gender:  female        MRN: [id number]  : 1940  (65 y.o.)  Referral Date : 17  Referring Practitioner: Tawanna Flynn PA-C  Diagnosis: chest pain  Additional Pertinent Hx:  Therese Alba is a 65 y.o. female with a past medical history of Dementia, GERD, Hyperlipidemia, and Hypertension who presents to the ED vis EMS for evaluation of shortness of breath. Per family, the patient has been complaining of feeling short of breath at night, particularly when laying flat. Family has noticed that when the patient is laying down to sleep she seems to be working harder to breath. These symptoms improve when the patient is placed into an upright seated position. She does not have any known lung diseases or history of CHF. The patient denies feeling short of breath at this time, but does report having occasional chest pain. Family also states that the patient had nausea and emesis two days ago, which has since resolved however the patient had been gagging more often when taking her medications. The patient does not use any chronic home O2 support. She is not a smoker. No additional complaints or concerns at the time of initial evaluation.       Past Medical History:   Diagnosis Date    Aspiration pneumonia (HCC)  bilateral  from large hiatus hernia 2017    Dementia     Depression     GERD (gastroesophageal reflux disease)     Hyperlipidemia     Hypertension      Past Surgical History:   Procedure Laterality Date    CHOLECYSTECTOMY      COLONOSCOPY      DE ESOPHAGOGASTRODUODENOSCOPY TRANSORAL DIAGNOSTIC N/A 2017    EGD ESOPHAGOGASTRODUODENOSCOPY performed by Melisa Antonio MD at [address] from continued therapy after discharge    Patient Education:  Patient Education: importance of therapy and pressure relief    Equipment Recommendations:  Equipment Needed: No  Other: continue to assess -  reports that process in wheelchair was started at another facility and states that he will have them resume and deliver wheelchair to home    Safety:  Type of devices: All fall risk precautions in place, Patient at risk for falls, Nurse notified, Call light within reach, Left in bed, Bed alarm in place    Plan:  Times per week: 3-5x GM  Specific instructions for Next Treatment: strengthening, sitting and standing balance, gait training, safety at home  Current Treatment Recommendations: Strengthening, Balance Training, Functional Mobility Training, Transfer Training, Endurance Training, Gait Training, Stair training, Home Exercise Program, Safety Education & Training, Equipment Evaluation, Education, & procurement, Patient/Caregiver Education & Training    Goals:  Patient goals : not stated    Short term goals  Time Frame for Short term goals: 1 week  Short term goal 1: patient to perform bed mobility at SBA to get in and out of bed  Short term goal 2: patient to perform transfers from various surfaces at SBA and HHA to rise from bed or chair  Short term goal 3: patient to ambulate 75ft with HHA at [facility] for household mobility  Short term goal 4: patient to negotiate 2 steps with one rail and HHA at [facility] for home access    Long term goals  Time Frame for Long term goals : defer d/t short ELOS    PT G-Codes  Functional Assessment Tool Used: Professional Judgement  Functional Limitation: Mobility: Walking and moving around  Mobility: Walking and Moving Around Current Status (): At least 1 percent but less than 20 percent impaired, limited or restricted  Mobility: Walking and Moving Around Goal Status ():  At least 1 percent but less than 20 percent impaired, limited or restricted

## 2017-10-03 NOTE — PROGRESS NOTES
Hospitalist Progress Note    Patient:  Megan Caro      Unit/Bed:8B-23/023-A    YOB: 1940    MRN: [id number]       Acct: [de-identified]     PCP: Gene Mtz DO    Date of Admission: 9/22/2017    Chief Complaint: shortness of breath, poor appetite, urinary retention, weakness in LE    Hospital Course: This patient was admitted to [facility] on 9/22/17 for chief complaints as noted above. She has history of dementia, per family was getting progressively more agitated at home. Noted to have increased SOB, nausea, vomiting. CTA of chest in ED showed mild groundglass infiltrate in both lung fields, pna/atelectasis right posterior costophrenic sulcus. There was concern of possible aspiration pneumonia with fever and elevated WBC so she was given IV Levaquin, IV Azithromycin in ED, started on IV Zosyn. Patient remained afebrile after admission, had procalcitonin/lactic acid checked and normal for three days so IV Zosyn discontinued on 9/25. A repeat CXR on 9/25 showed no evidence of acute process. Psychiatry consulted and resumed on home medications at appropriate intervals with improvement in agitation. Due to LE weakness on presentation, CT Lspine 9/22 in ED showed no acute fractures, subluxations, but chronic degenerative changes of L4-S1 with right foraminal stenosis. PT/OT consulted and following. Initially on admission a russell catheter was placed due to urinary retention, which was removed on 9/25 and patient voiding. On 9/27 a russell had to be replaced with PVR of 450 mL. On 9/25, patient had a fall in the bathroom and sustained no injuries. On 9/26 patient's family decided SNF at discharge and pre-cert was initiated. On 9/29 patient had an episode of black stools with positive occult stool. GI was consulted and the patient underwent EGD by Dr. Maria Esther Edgar showing erosive esophagitis, LA grade A, large hiatal hernia, old fluid and dilated stomach, gastric erosions, GI dysmotility.  Diet was increased, equal, round, and reactive to light. Conjunctivae/corneas clear. Oral mucosa is moist  Neck: Supple, with full range of motion. No jugular venous distention. Trachea midline. Respiratory:  Normal respiratory effort. Clear to auscultation, bilaterally without Rales/Wheezes/Rhonchi. On room air  Cardiovascular: Regular rate and rhythm with normal S1/S2 without murmurs, rubs or gallops. Abdomen: Soft, non-tender, non-distended with active bowel sounds. Allowed palpation, no tenderness with light or deep palpation. Extremities: non tender with palpation, no edema  Musculoskeletal: No clubbing, cyanosis bilaterally. Full range of motion without deformity. Skin: Skin color, texture, turgor normal.  No rashes or lesions. Neurologic: sleepy, alert to self. States \"get away from me, leave me alone\". Refused to follow commands, states \"why\". Capillary Refill: Brisk,< 3 seconds   Peripheral Pulses: +2 palpable, equal bilaterally       Labs:   Recent Labs      10/01/17   0522  10/02/17   0451  10/03/17   0611   WBC  9.6   --   8.9   HGB  10.0*  10.0*  10.3*   HCT  29.2*  28.4*  29.7*   PLT  327   --   333     Recent Labs      10/01/17   0522  10/02/17   0451  10/03/17   0611   NA  131*  133*  137   K  3.8  3.5  3.9   CL  95*  97*  99   CO2  25  26  25   BUN  22  17  15   CREATININE  0.8  0.8  0.8   CALCIUM  8.2*  8.3*  8.6     Urinalysis:      Lab Results   Component Value Date    NITRU NEGATIVE 09/27/2017    WBCUA 2-4 09/27/2017    BACTERIA FEW 09/27/2017    RBCUA 5-10 09/27/2017    BLOODU MODERATE 09/27/2017    SPECGRAV 1.028 09/22/2017    GLUCOSEU NEGATIVE 09/27/2017       Radiology:  XR ABDOMEN (KUB) (SINGLE AP VIEW)   Final Result   1. Findings are consistent with constipation. 2.  The distribution and amount of gas present does not suggest a bowel obstruction at this time. *This report has been created using voice recognition software.  It may contain minor errors which are inherent in voice recognition technology. **      Final report electronically signed by Dr. Balwinder Flanagan on 9/22/2017 10:18 PM      CT THORACIC RECONSTRUCTION WO POST PROCESS   Final Result   1. No acute fractures or acute subluxations. 2.  Minimal degenerative changes. 3.  No obvious discogenic acute processes or disc bulges. CT is less sensitive than MRI for disc pathology. **This report has been created using voice recognition software. It may contain minor errors which are inherent in voice recognition technology. **      Final report electronically signed by Dr. Balwinder Flanagan on 9/22/2017 10:15 PM      CT ABDOMEN PELVIS W IV CONTRAST Additional Contrast? None   Final Result   1. Very large fluid-filled hiatal hernia. The intra-abdominal portion of the stomach is distended with a large gas fluid level. 2.  No bowel obstruction. 3.  Increase in the size of the left ovarian cyst. In the postmenopausal patient systems this side is almost certainly benign but should be followed annually with ultrasound. 4.  Stable small likely simple cyst in the left kidney inferiorly. 5.  Moderate diverticulosis of the colon which looks unchanged and shows no acute process associated with it. **This report has been created using voice recognition software. It may contain minor errors which are inherent in voice recognition technology. **      Final report electronically signed by Dr. Balwinder Flanagan on 9/22/2017 5:12 PM      CTA CHEST [address]   Final Result   1. No pulmonary emboli are seen. . Questionable pulmonary arterial hypertension. 2. Large hiatus hernia. Moderately severe distention of the stomach with fluid and air. 3. Mild infiltrates in both lungs. See comments above. **This report has been created using voice recognition software. It may contain minor errors which are inherent in voice recognition technology. **          Final report electronically signed by Dr. Vanessa Simon on 9/22/2017 4:58 PM      XR Chest Portable   Final Result   Large fixed hiatal hernia. No definite acute findings. **This report has been created using voice recognition software. It may contain minor errors which are inherent in voice recognition technology. **      Final report electronically signed by Dr. Lucas Mcpherson on 9/22/2017 12:53 PM          Diet: Dietary Nutrition Supplements: Standard High Calorie Oral Supplement  Dietary Nutrition Supplements: Frozen Oral Supplement  DIET DENTAL SOFT; Low Fiber    DVT prophylaxis: [] Lovenox                                 [x] SCDs                                 [] SQ Heparin                                 [] Encourage ambulation           [] Already on Anticoagulation     Disposition:    [] Home       [] TCU       [] Rehab       [] Psych       [x] SNF       [] [place]       [] Other-    Code Status: DNR-CCA    PT/OT Eval Status:  active and ongoing      Assessment/Plan:    Anticipated Discharge in : pending ECF placement    Active Hospital Problems    Diagnosis Date Noted    Hiatus hernia with compression intrathoracic [K44.0] 09/22/2017     Priority: High     Class: Acute    Chest pain [R07.9] 09/22/2017     Priority: High     Class: Acute    Spinal stenosis at L4-L5 level  with left leg weakness and urinary retention [place] 09/22/2017     Priority: High     Class: Acute    Weakness of left lower extremity  spinal stenosis  or cva [R29.898] 09/22/2017     Priority: High     Class: Acute    Urinary retention  neurogenic bladder [R33.9] 09/22/2017     Priority: High     Class: Chronic    Alzheimer's dementia with behavioral disturbance [G30.8, F02.81] 09/22/2017     Priority: High     Class: Acute    Aspiration pneumonia ([address]  bilateral  from large hiatus hernia [J69.0] 09/22/2017     Priority: High     Class: Acute    Fever [R50.9]     Dehydration [E86.0]        1. Possible Aspiration Pneumonia (POA)--resolved.  CTA chest

## 2017-10-03 NOTE — PLAN OF CARE
Problem: Discharge Planning:  Goal: Discharged to appropriate level of care  Discharged to appropriate level of care   Outcome: Ongoing  Patient plans to transfer to [facility]. Patient following plan of care to be discharged at appropriate length of stay. Goal: Participates in care planning  Participates in care planning   Outcome: Ongoing    Problem: Cardiac Output - Decreased:  Goal: Hemodynamic stability will improve  Hemodynamic stability will improve   Outcome: Ongoing  Pt blood pressure and vitals being monitored frequently, lab work being addressed with physician if not within normal limits or critical.        Problem: Tissue Perfusion - Cardiopulmonary, Altered:  Goal: Absence of angina  Absence of angina   Outcome: Ongoing  Patient displays  No intermittent angina during shift. Rn continues to assess pt for signs of angina        Problem: Safety:  Goal: Ability to chew and swallow food without choking will improve  Ability to chew and swallow food without choking will improve   Outcome: Ongoing  . Rn assessed pt ability to swallow fluids for medication administration, pt maintained patent airway and avoided aspiration    Problem: Nutrition  Goal: Optimal nutrition therapy  Outcome: Ongoing  Pt maintaining nutrition needs through needed meals and fluids. RN continuing to encourage adequate caloric and fluid intake specific to condition. Pertaining labs assessed, provider notified as needed        Problem: Falls - Risk of:  Goal: Will remain free from falls  Will remain free from falls   Outcome: Ongoing  Patient absent of falls this shift. RN visually checks on patient hourly with rounds. Patient was educated on how to use call light to get out of bed to reduce the risk of falls. Call light within reach, bed in lowest position.         Problem: Pain:  Goal: Pain level will decrease  Pain level will decrease   Outcome: Ongoing  Patient has voiced pain this shift 0/10, Rn continues to assess pain and provide comfort care throughout shift. Goal: Control of acute pain  Control of acute pain   Outcome: Ongoing  Goal: Control of chronic pain  Control of chronic pain   Outcome: Ongoing    Comments:   Care plan reviewed with patient and . Patient and  verbalize understanding of the plan of care and contribute to goal setting.

## 2017-10-03 NOTE — CONSULTS
Inpatient consult to Neurology  Consult performed by: [facility], [address] [place] [address] ordered by: Toribio Greene NOTE      Requesting Physician: Agustin Wright MD    Reason for Consult:  Evaluate for confusion    History of Present Illness:  Romina Lawrence is a 65 y.o. female admitted to [address] [place] [address] on 9/22/2017. This patient was admitted to [facility] on 9/22/17 for chief complaints of shortness of breath, poor appetite, urinary retention, weakness in her lower extremities. She has history of dementia, per family was getting progressively more agitated at home for which neurology was consulted. Noted to have increased SOB, nausea, vomiting. CTA of chest in ED showed mild ground glass infiltrate in both lung fields, pna/atelectasis right posterior costophrenic sulcus. There?was concern of possible aspiration pneumonia with fever and elevated WBC so she was given IV Levaquin, IV Azithromycin in ED, started on IV Zosyn. A repeat CXR on 9/25 showed no evidence of acute process. Psychiatry consulted and resumed on home medications at appropriate intervals with improvement in agitation. Due to lower extremity weakness on presentation, CT L-spine 9/22 in ED showed no acute fractures, subluxations, but chronic degenerative changes of L4-S1 with right foraminal stenosis. Patient is a poor historian, history obtained from , family and review of medical record.          Past Medical History:        Diagnosis Date    Aspiration pneumonia (HCC)  bilateral  from large hiatus hernia 9/22/2017    Dementia     Depression     GERD (gastroesophageal reflux disease)     Hyperlipidemia     Hypertension            Procedure Laterality Date    CHOLECYSTECTOMY      COLONOSCOPY      VA ESOPHAGOGASTRODUODENOSCOPY TRANSORAL DIAGNOSTIC N/A 9/30/2017    EGD ESOPHAGOGASTRODUODENOSCOPY performed by Charles Tracy MD at [facility]          Allergies:    No Known interested in medications for memory at this time. 4. DVT prophylaxis. 5. Will follow as needed. 6. PT  7. Speech therapy. 8. Case was discussed with primary service. 9. All questions were answered. It was my pleasure to evaluate Eneida Kinney today. Please call with questions.       Electronically signed by Bernida Krabbe, MD on 10/3/2017 at 5:04 PM

## 2017-10-03 NOTE — PROGRESS NOTES
Hospital Follow Up Note  CHUN Lara 1940   10/3/2017 8:39 AM  S     Patient in bed, easily wakes. Spouse at bedside. Planning discharge to [address] [place] [address] Road:    10/03/17 0813   BP: (!) 140/88   Pulse: 66   Resp: 18   Temp: 98.9 °F (37.2 °C)   SpO2: 90%            Awake          Heart  RRR         Lungs CTAB          ABD S/NT/ND/+BS         Ext No LLE     A. Hematemesis   Anorexia   EGD 9-30-17 Erosive esophagitis. LAC grade A probably from retching. Large hiatal hernia. Old fluid and dilated stomach. Gastric erosions, biopsy to rule out HP disease. PATH negative H. Pylori. Recommendations this is GI dysmotility. Small bowel obstruction is Not clinically there but there might be small bowel dysmotility as well. Bleeding and iron deficiency anemia is from erosive esophagitis which is secondary from retching. Conservative care due to advance dementia is advised.      Carafate qid / Pantoprazole bid       Dementia   Weakness, deconditioning   Speech language evaluation 9-27-17 normal OP function with no overt aspiration of solids or liquids. Improved mentation playing a large role in improved swallowing function. Regular diet with thin liquids. Kimberley Estrada has seen this visit      Anemia without overt GI bleeding  Iron deficiency  Oral iron supplementation tid  FOBT positive   History IBS   Diarrhea, GI pathogen panel  9-29-17 negative         P.  Continue Pantoprazole bid and Carafate AC/HS post discharge   Low residue diet for gastroparesis   Okay from GI standpoint to discharge to nursing home when cleared with all services   Follow up office visit with Dr Lacie Greenwood or Roberto Mckinney CNP outpatient as needed   CHUN signing off       A&P discussed with Dr Lora Yates CNP

## 2017-10-03 NOTE — PROGRESS NOTES
Department of Psychiatry  Physician Assistant Progress Note     Chief Complaint: dementia with behavioral disturbance     SUBJECTIVE:      Psychiatry re consulted for agitation, sedation  Patient initially appeared to improve since last seen  However, last few days, she has been combative, agitated, then somnolent (as she was when I attempted to see her)    OBJECTIVE      Medications  Current Facility-Administered Medications: atorvastatin (LIPITOR) tablet 40 mg, 40 mg, Oral, Daily  tamsulosin (FLOMAX) capsule 0.4 mg, 0.4 mg, Oral, Daily  loperamide (IMODIUM) capsule 2 mg, 2 mg, Oral, Nightly PRN  atenolol (TENORMIN) tablet 50 mg, 50 mg, Oral, Daily  ferrous gluconate (FERGON) tablet 360 mg, 360 mg, Oral, TID WC  pantoprazole (PROTONIX) tablet 40 mg, 40 mg, Oral, BID AC  sodium chloride (PF) 0.9 % injection 10 mL, 10 mL, Intravenous, 2 times per day  sodium chloride (PF) 0.9 % injection 10 mL, 10 mL, Intravenous, PRN  sucralfate (CARAFATE) 1 GM/10ML suspension 1 g, 1 g, Oral, 4 times per day  hydrALAZINE (APRESOLINE) tablet 50 mg, 50 mg, Oral, BID PRN  ondansetron (ZOFRAN) injection 4 mg, 4 mg, Intramuscular, Q6H PRN  hydrALAZINE (APRESOLINE) injection 10 mg, 10 mg, Intramuscular, Q4H PRN  lidocaine PF 1 % injection 5 mL, 5 mL, Intradermal, Once  sodium chloride flush 0.9 % injection 10 mL, 10 mL, Intravenous, 2 times per day  sodium chloride flush 0.9 % injection 10 mL, 10 mL, Intravenous, PRN  bisacodyl (DULCOLAX) suppository 10 mg, 10 mg, Rectal, Daily PRN  potassium replacement protocol, , Other, RX Placeholder  influenza quadrivalent split vaccine (FLUZONE;FLUARIX) injection 0.5 mL, 0.5 mL, Intramuscular, Once  ALPRAZolam (XANAX) tablet 0.5 mg, 0.5 mg, Oral, Q8H PRN  donepezil (ARICEPT) tablet 20 mg, 20 mg, Oral, Daily  ipratropium-albuterol (DUONEB) nebulizer solution 1 ampule, 1 ampule, Inhalation, Q4H PRN  ARIPiprazole (ABILIFY) tablet 2 mg, 2 mg, Oral, BID  venlafaxine (EFFEXOR XR) extended release capsule

## 2017-10-03 NOTE — PROGRESS NOTES
Marisol Costa from Psychiatry called and states to add patient back to their list and they will reevaluate her. Updated her on patient condition and how she has been acting.

## 2017-10-03 NOTE — PROGRESS NOTES
[facility]  OCCUPATIONAL THERAPY MISSED TREATMENT NOTE  ACUTE CARE    Date: 10/3/2017  Patient Name: Onel Pardo        CSN: [id number]   : 1940  (65 y.o.)  Gender: female   Referring Practitioner: Nadira Fischer PA-C  Diagnosis: Chest Pain         REASON FOR MISSED TREATMENT:  Missed Treat. SORENSEN attempted to alert pt to therapeutic level, however pt unable to participate despite several attempts. OT to see as time allows.

## 2017-10-03 NOTE — PROCEDURES
A Bladder scan was performed at 1555 . The patient's last void was at (cath removed at 0900). The residual amount was measured to be 48 ML. Report of results was given to [facility].

## 2017-10-03 NOTE — PROGRESS NOTES
Patient awakens easily. Awakens pleasant and states agreement to get up to chair for breakfast. Once moving patient around she becomes more agitated and hits and kicks staff. Xanax given to her per husbands request. Patient refusing to turn in bed.

## 2017-10-04 VITALS
HEIGHT: 61 IN | WEIGHT: 207.7 LBS | HEART RATE: 67 BPM | OXYGEN SATURATION: 96 % | DIASTOLIC BLOOD PRESSURE: 62 MMHG | TEMPERATURE: 97.7 F | BODY MASS INDEX: 39.21 KG/M2 | RESPIRATION RATE: 18 BRPM | SYSTOLIC BLOOD PRESSURE: 125 MMHG

## 2017-10-04 LAB
ANION GAP SERPL CALCULATED.3IONS-SCNC: 10 MEQ/L (ref 8–16)
BUN BLDV-MCNC: 14 MG/DL (ref 7–22)
CALCIUM SERPL-MCNC: 8.4 MG/DL (ref 8.5–10.5)
CHLORIDE BLD-SCNC: 102 MEQ/L (ref 98–111)
CO2: 27 MEQ/L (ref 23–33)
CREAT SERPL-MCNC: 0.9 MG/DL (ref 0.4–1.2)
GFR SERPL CREATININE-BSD FRML MDRD: 61 ML/MIN/1.73M2
GLUCOSE BLD-MCNC: 102 MG/DL (ref 70–108)
HCT VFR BLD CALC: 30.9 % (ref 37–47)
HEMOGLOBIN: 10.6 GM/DL (ref 12–16)
MCH RBC QN AUTO: 29 PG (ref 27–31)
MCHC RBC AUTO-ENTMCNC: 34.2 GM/DL (ref 33–37)
MCV RBC AUTO: 84.7 FL (ref 81–99)
PDW BLD-RTO: 16.6 % (ref 11.5–14.5)
PLATELET # BLD: 269 THOU/MM3 (ref 130–400)
PMV BLD AUTO: 6.9 MCM (ref 7.4–10.4)
POTASSIUM SERPL-SCNC: 3.9 MEQ/L (ref 3.5–5.2)
RBC # BLD: 3.65 MILL/MM3 (ref 4.2–5.4)
SODIUM BLD-SCNC: 139 MEQ/L (ref 135–145)
WBC # BLD: 6 THOU/MM3 (ref 4.8–10.8)

## 2017-10-04 PROCEDURE — 51798 US URINE CAPACITY MEASURE: CPT

## 2017-10-04 PROCEDURE — 6370000000 HC RX 637 (ALT 250 FOR IP): Performed by: NURSE PRACTITIONER

## 2017-10-04 PROCEDURE — 85027 COMPLETE CBC AUTOMATED: CPT

## 2017-10-04 PROCEDURE — 80048 BASIC METABOLIC PNL TOTAL CA: CPT

## 2017-10-04 PROCEDURE — 36415 COLL VENOUS BLD VENIPUNCTURE: CPT

## 2017-10-04 PROCEDURE — 6370000000 HC RX 637 (ALT 250 FOR IP): Performed by: PHYSICIAN ASSISTANT

## 2017-10-04 PROCEDURE — 99239 HOSP IP/OBS DSCHRG MGMT >30: CPT | Performed by: PHYSICIAN ASSISTANT

## 2017-10-04 PROCEDURE — 6370000000 HC RX 637 (ALT 250 FOR IP): Performed by: ANESTHESIOLOGY

## 2017-10-04 RX ORDER — VENLAFAXINE HYDROCHLORIDE 75 MG/1
75 CAPSULE, EXTENDED RELEASE ORAL
Qty: 30 CAPSULE | Refills: 0 | Status: ON HOLD | DISCHARGE
Start: 2017-10-04 | End: 2019-05-21 | Stop reason: DRUGHIGH

## 2017-10-04 RX ORDER — PANTOPRAZOLE SODIUM 40 MG/1
40 TABLET, DELAYED RELEASE ORAL
Qty: 60 TABLET | Refills: 0 | Status: ON HOLD | DISCHARGE
Start: 2017-10-04 | End: 2019-05-21

## 2017-10-04 RX ORDER — QUINIDINE GLUCONATE 324 MG
300 TABLET, EXTENDED RELEASE ORAL
Status: ON HOLD | DISCHARGE
Start: 2017-10-04 | End: 2019-05-21

## 2017-10-04 RX ORDER — ATENOLOL 50 MG/1
50 TABLET ORAL DAILY
Qty: 30 TABLET | Refills: 0 | Status: ON HOLD | DISCHARGE
Start: 2017-10-04 | End: 2019-05-24 | Stop reason: HOSPADM

## 2017-10-04 RX ORDER — RISPERIDONE 0.25 MG/1
0.25 TABLET, FILM COATED ORAL 2 TIMES DAILY
Qty: 60 TABLET | Refills: 0 | Status: ON HOLD | DISCHARGE
Start: 2017-10-04 | End: 2019-05-21 | Stop reason: DRUGHIGH

## 2017-10-04 RX ORDER — ALPRAZOLAM 0.5 MG/1
0.5 TABLET ORAL EVERY 8 HOURS PRN
Qty: 6 TABLET | Refills: 0 | Status: ON HOLD | OUTPATIENT
Start: 2017-10-04 | End: 2019-05-21

## 2017-10-04 RX ORDER — SUCRALFATE ORAL 1 G/10ML
1 SUSPENSION ORAL
Qty: 1200 ML | Refills: 1 | Status: ON HOLD | DISCHARGE
Start: 2017-10-04 | End: 2019-05-21 | Stop reason: DRUGHIGH

## 2017-10-04 RX ADMIN — SUCRALFATE 1 G: 1 SUSPENSION ORAL at 00:31

## 2017-10-04 RX ADMIN — Medication 120 MG: at 08:46

## 2017-10-04 ASSESSMENT — PAIN SCALES - GENERAL
PAINLEVEL_OUTOF10: 0
PAINLEVEL_OUTOF10: 0

## 2017-10-04 NOTE — FLOWSHEET NOTE
0735 patient assisted into bed. [place] removed as ordered. Tip intact. Bed alarm on.  Call light in reach
09/23/17 1718   Provider Notification   Reason for Communication Review case   Provider Name [facility]   Provider Notification Physician Assistant   Method of Communication Secure Message   Response Waiting for response   Notification Time [id number]   Message sent reads: Would you like to change medication order? Patient takes aricept 20 mg PO daily. It is ordered as 10 mg PO daily. Changed dosage in home medication list.    Aricept changed to 20 mg PO daily as patient takes at home.
09/26/17 1320   Encounter Summary   Services provided to: Patient and family together   Referral/Consult From: [address]; Children   Place of [address] Religious   Contact Yazidism No   Continue Visiting Yes  (9/26)   Complexity of Encounter Moderate   Length of Encounter 15 minutes   Routine   Type Initial   Assessment Sleeping   Intervention Nurtured hope;Prayer;Explored coping resources; Explored feelings, thoughts, concerns; Active listening   Outcome Coping;Encouraged   Spiritual/Roman Catholic   Type Spiritual support   Spiritual Care Follow Up:     Met patient's  and daughter. Patient is asleep but in talking with her daughter found out that she belongs to [employer]. We all held hands and had prayer for healing. Care Plan:  Continue spiritual and emotional care for patient and family. Including prayers.
10/04/17 0829   Provider Notification   Reason for Communication Review case   Provider Name [facility]   Provider Notification Physician Assistant   Method of Communication Secure Message   Response Other (Comment)   Notification Time 7996      Contacted back stating she would be up soon.
10/04/17 0831   Provider Notification   Reason for Communication Review case   Provider Name [facility]   Provider Notification Physician Assistant   Method of Communication Secure Message   Response See orders   Notification Time [address]    Physician assistant contacted back stating a bladder scan could be done and order was put in.
10/04/17 1200   Provider Notification   Reason for Communication Review case   Provider Name Dr Anabel Izaguirre   Provider Notification Physician   Method of Communication Face to face   Response No new orders   Notification Time 1200     Talked with physician stated medications appropriate from his stand point and can discharge.
10/04/17 1213   Provider Notification   Reason for Communication Review case   Provider Name [facility]   Provider Notification Physician Assistant   Method of Communication Secure Message   Response Waiting for response   Notification Time 26    Dr. Vivian Trevino was on floor stated medication were good from his stand point and that patient was good for discharge.
10/04/17 1225   Provider Notification   Reason for Communication Review case   Provider Name [facility]   Provider Notification Physician Assistant   Method of Communication Secure Message   Response Waiting for response   Notification Time [address]     just reminding you about signing the .Tolu Garcia in disccharge instructions for patient
10/04/17 1226   Provider Notification   Reason for Communication Review case   Provider Name [facility]   Provider Notification Physician Assistant   Method of Communication Secure Message   Response No new orders   Notification Time 1226     I'm waiting to meet with Dr Andrew Henao, then will discharge
10/04/17 7178   Provider Notification   Reason for Communication Review case   Provider Name Dr. Matt Lewis   Provider Notification Physician   Method of Communication Secure Message   Response Waiting for response   Notification Time 2966     Patient had CT of head that came back negative for anything acute. Was wondering if there was anything else neurology wanted from your stand point or if you would be signing off.
10/04/17 9613   Provider Notification   Reason for Communication Review case   Provider Name Dr. Jordan Lerner   Provider Notification Physician   Method of Communication Secure Message   Response No new orders   Notification Time [phone]      Physician contacted back stating \"Signing off.  Follow up in two to 3 weeks as outpatient\"
Bladder contents 450mls, orders for urinary catheter
Daughter voices concern that patient breathing appears labored at times and requests a ct scan to be done. Lung sounds are clear. Pulse ox 94% on room air. No resp distress noted.  Ruben Kelley CNP notified via secure message
Pneumonia teaching sheet given to patient and .  voiced understanding.
Pt lower abd firm, still has not urinated since this a.m.
Pt states right foot is tender, warm and pulses present.  Elevated, pt refused ice or further tx
present at time of fall.  Daughter Aysha Warner  Date:   09/25/17 Time:   0847

## 2017-10-04 NOTE — DISCHARGE SUMMARY
Hospital Medicine Discharge Summary      Patient Identification:   Kenzie Gamboa   : 1940  MRN: [id number]   Account: [de-identified]      Patient's PCP: Kendy Milian DO    Admit Date: 2017     Discharge Date: 10/4/2017  10/4/17    Admitting Physician: Lalo Vides MD     Discharge Physician: Georges Camejo PA-C     Discharge Diagnoses:  1. Possible Aspiration Pneumonia (POA)--resolved. CTA chest : mild atelectasis/pna right posterior costohrenic sulcus. Initially febrile with leukocytosis. Tx IV Azithromycin, IV Levaquin in ED. Procal 0.07, 0.06, 0.05. Repeat CXR : no acute process. Treated IV Zosyn -. No cough, afebrile, WBC 6.0 on 10/4.   2. SIRS (POA)--resolved. In ED WBC 15.0, febrile tmax 100.9. Flu A/B (-), Blood cultures no growth. U/A (-), CXR/CTA as in #1. Procalc 0.07, 0.06, 0.05, lactic acid 1.0. Afebrile, WBC 6.0 on 10/4.   3. Large Hiatal Hernia--noted on CT/EGD. Continue PPI, sitting up after eating for at least 45 minutes. GI followed. 4. Nausea and Vomiting--resolved. Likely due to #5,6.   5. GI Dysmotility--EGD noted old food and liquid in stomach. Likely contributing to #4. continue dental soft diet. 6. Erosive Esophagitis--noted on EGD on  with Dr. Zenia Ellison. Path negative for H. Pylori. Continue ppi, carafate BID. 7. Generalized Weakness--related to dementia, poor appetite. Continue PT/OT  8. Acute Blood Loss Anemia--hgb 10.6, MCV 84.7 on 10/4. Iron low on . Per GI due to erosive esophagitis. No active bleeding on EGD. Continue ppi, ferrous gluconate. 9. Essential Hypertension, controlled-- continue atenolol. 10. Urinary Retention--was going 39 hr without urinating at home. Russell placed on admission. Two trials of removal and had to have russell replaced due to PVR >400 mL. Trial of Flomax did not help during admission. Will follow up with urology in one week after discharge. Discharged with russell in place.    11. Alzheimer's Levaquin and IV Azithromycin in the ED. She was started on Zosyn. She remained afebrile after admission, procalcitonin and lactic acid were checked and normal for three consecutive days. A repeat chest xray on 9/25/17 showed no acute process so IV Zosyn was discontinued. Psychiatry was consulted on admission due to increased combativeness at home and resumed home medications at appropriate intervals with improvement in agitation. There was question that she may have been getting her meds at wrong intervals at home. Due to lower extremity weakness on presentation, a CT of the lumbar spine on 9/22, in ED, showed no acute fractures, subluxations but chronic degenerative changes of L4-S1 with right foraminal stenosis. PT and OT were consulted and followed. Family reported on admission that the patient would go over 39 hours at home without urinating and a russell catheter was placed on admission during urinary retention. On 9/25/17 the catheter was removed and patient voided on her own. On 9/27/17 the catheter had to be replaced due to PVR of 450 mL. On 9/25/17 the patinet had a fall in the bathroom after her  took her on his own, and the patient sustained no injuries. On 9/26/17 patient's family decided to have the patient placed in SNF at discharge. On 7/67/78 a pre-cert was initiated after the family decided on [facility] Mirnaolamide[facility]. On 9/29/17, the patient had an episode of emesis, black appearing stools and was occult positive. GI was consulted and the patient underwent EGD by Dr. Lyndon Tucker on 9/30/17. EGD showed erosive esophagitis, LA grade A probably from retching, large hiatal hernia, old fluid and dilated stomach, gastric erosions. She was started on PPI, carafate and iron supplementation. Pathology of erosion was negative for H. Pylori. Diet was increased to soft diet and conservative management was recommended. Over the weekend, pre certification ran out and was restarted on 10/2/17.  On 10/3/17 the patient was more recognition software. It may contain minor errors which are inherent in voice recognition technology. **      Final report electronically signed by Dr. Hany Bay on 9/22/2017 10:18 PM      CT THORACIC RECONSTRUCTION WO POST PROCESS   Final Result   1. No acute fractures or acute subluxations. 2.  Minimal degenerative changes. 3.  No obvious discogenic acute processes or disc bulges. CT is less sensitive than MRI for disc pathology. **This report has been created using voice recognition software. It may contain minor errors which are inherent in voice recognition technology. **      Final report electronically signed by Dr. Hany Bay on 9/22/2017 10:15 PM      CT ABDOMEN PELVIS W IV CONTRAST Additional Contrast? None   Final Result   1. Very large fluid-filled hiatal hernia. The intra-abdominal portion of the stomach is distended with a large gas fluid level. 2.  No bowel obstruction. 3.  Increase in the size of the left ovarian cyst. In the postmenopausal patient systems this side is almost certainly benign but should be followed annually with ultrasound. 4.  Stable small likely simple cyst in the left kidney inferiorly. 5.  Moderate diverticulosis of the colon which looks unchanged and shows no acute process associated with it. **This report has been created using voice recognition software. It may contain minor errors which are inherent in voice recognition technology. **      Final report electronically signed by Dr. Hany Bay on 9/22/2017 5:12 PM      CTA CHEST [address]   Final Result   1. No pulmonary emboli are seen. . Questionable pulmonary arterial hypertension. 2. Large hiatus hernia. Moderately severe distention of the stomach with fluid and air. 3. Mild infiltrates in both lungs. See comments above. **This report has been created using voice recognition software.   It may contain minor errors which are inherent in voice recognition by mouth 2 times daily       sucralfate 1 GM/10ML suspension   Commonly known as:  CARAFATE   Take 10 mLs by mouth 4 times daily (before meals and nightly)         CHANGE how you take these medications          atenolol 50 MG tablet   Commonly known as:  TENORMIN   Take 1 tablet by mouth daily Hold for systolic blood pressure less than 110, heart rate less than 60   What changed:    - medication strength  - how much to take  - additional instructions       pantoprazole 40 MG tablet   Commonly known as:  PROTONIX   Take 1 tablet by mouth 2 times daily (before meals)   What changed:    - medication strength  - how much to take  - when to take this       venlafaxine 75 MG extended release capsule   Commonly known as:  EFFEXOR XR   Take 1 capsule by mouth daily (with breakfast)   What changed:  when to take this         CONTINUE taking these medications          ALPRAZolam 0.5 MG tablet   Commonly known as:  XANAX   Take 1 tablet by mouth every 8 hours as needed for Anxiety       atorvastatin 40 MG tablet   Commonly known as:  LIPITOR       donepezil 10 MG tablet   Commonly known as:  ARICEPT       loperamide 2 MG capsule   Commonly known as:  IMODIUM         STOP taking these medications          ARIPiprazole 2 MG tablet   Commonly known as:  ABILIFY       aspirin 81 MG tablet       dicyclomine 20 MG tablet   Commonly known as:  BENTYL       glycopyrrolate 1 MG tablet   Commonly known as:  ROBINUL       hydrALAZINE 50 MG tablet   Commonly known as:  APRESOLINE       LISINOPRIL PO       omeprazole 10 MG delayed release capsule   Commonly known as:  PRILOSEC       simvastatin 40 MG tablet   Commonly known as:  ZOCOR            Where to Get Your Medications      You can get these medications from any pharmacy     Bring a paper prescription for each of these medications     ALPRAZolam 0.5 MG tablet         Information about where to get these medications is not yet available     !  Ask your nurse or doctor about these medications     atenolol 50 MG tablet    ferrous gluconate 240 (27 Fe) MG tablet    pantoprazole 40 MG tablet    risperiDONE 0.25 MG tablet    sucralfate 1 GM/10ML suspension    venlafaxine 75 MG extended release capsule               Time Spent on discharge is more than 40 minutes  in the examination, evaluation, counseling and review of medications and discharge plan. Signed: Thank you Kenny Byers DO for the opportunity to be involved in this patient's care.     I have discussed this patient's care and plan with Dr. Dumont Salvage   Electronically signed by Armida Laughlin PA-C on 10/5/2017 at 8:42 AM

## 2017-10-04 NOTE — PROCEDURES
A Bladder scan was performed at 0925 . The patient's last void was unknown . The residual amount was measured to be 412 ML. Report of results was given to [facility].

## 2017-10-04 NOTE — CARE COORDINATION
10/2/17, 9:31 AM    DISCHARGE BARRIERS    Aury from [facility] called, will need recent PT & OT notes to begin pre-cert again. Advised will need to wait until PT & OT has seen patient today and notes are in, will fax when completed. Patient should be ready for discharge today or tomorrow. 11:57 AM  Pt & OT notes from today faxed to [facility].   Pre-cert will be started
10/4/17 7:53 AM     Psych and neuro consulted yesterday due to patient's combativeness and spouse being concerned about a possible stroke. Psych has seen and adjusted meds. Neuro has evaluated, no plans. CT head neg. [facility] can accept patient this a.m. Discussed with Jacqueline Clinical Manager in rounds, she will have RN contact physicians this morning for discharge approval.  IMM reviewed with patient and , state \"ready for discharge today\".
10/4/17, 2:38 PM    Patient discharged today to [facility] by [facility] stretcher. Patients  aware, will be riding with patient in ambulance. Aury at [facility] notified of discharge. Faxed AVS, MARS and prescription. Lalit Nunez RN aware to call report. Discharge plan discussed by  and . Discharge plan reviewed with patient/ family. Patient/ family verbalize understanding of discharge plan and are in agreement with plan. Understanding was demonstrated using the teach back method.        IMM Letter  IMM Letter given to Patient/Family/Significant other/Guardian/POA/by[de-identified] Serjio Duran CM   IMM Letter date given[de-identified] 10/04/17  IMM Letter time given[de-identified] 2758
9/26/17, 9:32 AM    DISCHARGE BARRIERS    Spoke with patient , Kallie Knowles regarding discharge planning. Kallie Knowles stated they decided on an ECF placement. He has toured [facility] and would like to further explore that option. Spoke with Renetta Glaser at [facility], they have female beds available. Faxed facesheet, H&P, prognosis notes, nurses notes, PT/OT eval and notes, and MAR.    1:40 PM  [facility] is out of network. Spoke with patients  and daughter, they would like to see if patient can be skilled at [facility] before starting personal pay. Spoke with [address] [place] [address] ([place] [facility]) at [facility], faxed facesheet. [address] [place] [address] ([place] [facility]) will run insurance. 3:19 PM  Spoke with Lucia at [facility], insurance is out of network however, co-pay and deductible is not much higher than in network. Gave information to patients daughter. Encouraged her to talk with Jeffry.
9/27/17, 2:01 PM      Ree Vernon day: 5  Location: [facility]23/023-A Reason for admit: Chest pain [R07.9]   Treatment Plan of Care: [place] placed for urinary retention. PT/OT. Core Measures: vte, pneumonia complete  PCP: Cathy Garcia DO  Discharge Plan: [facility] assisting with ECF placement.
9/28/17, 8:32 AM    DISCHARGE BARRIERS    Spoke with patients spouse Benji Mckeon at length regarding placement at discharge. He has decided to place patient at [facility]. Spoke with Carole Sims at [facility] to make referral again. Carole Sims wants to skill patient, then transition to personal when needed. Faxed H&P, progress notes, nurses notes, consult, therapy notes, facesheet and PASRR. Carole Sims will work on starting precert.
9/29/17, 9:44 AM    DISCHARGE BARRIERS    Spoke with Marilu Helm from [facility] regarding precert, Gerber Puga wanting any updates with PT. Faxed PT note from yesterday. 10:25 AM  Received call at 9:45am from [facility], precert approved, will move forward with discharge today. Informed by Jaquelin Melton CNP that patient is scheduled for a EGD tomorrow at 9:00am, patient will not be discharged at least until after the EGD. Called [facility], informed that patient is scheduled for a EGD tomorrow, will not be discharged today. RN on unit can will contact over the weekend if patient is discharged.
Assistance Needed:  Home Care  Potential Assistance Purchasing Medications:  No  Does patient want to participate in local refill/ meds to beds program?  No  Type of Home Care Services:  Housekeeping, Nursing Services  Patient expects to be discharged to:  home  Expected Discharge date:  09/29/17  Follow Up Appointment: Best Day/ Time: Tuesday PM    Discharge Plan: Home with . Refugio ELIZABETH assisting with discharge needs.       Evaluation: no

## 2017-10-12 ENCOUNTER — TELEPHONE (OUTPATIENT)
Dept: PSYCHIATRY | Age: 77
End: 2017-10-12

## 2017-10-12 NOTE — TELEPHONE ENCOUNTER
Received a call from the floor on 10/6 regarding this patient. They were wanting to schedule here in the practice [facility]  It is questionable that patient would be appropriate for our office setting and wanted to check with you to see if you had any thoughts regarding this case.

## 2019-01-01 ENCOUNTER — TELEPHONE (OUTPATIENT)
Dept: FAMILY MEDICINE CLINIC | Age: 79
End: 2019-01-01

## 2019-01-01 ENCOUNTER — HOSPITAL ENCOUNTER (INPATIENT)
Age: 79
LOS: 3 days | Discharge: NURSING, REHAB OR LONG-TERM CARE | DRG: 291 | End: 2019-12-23
Attending: INTERNAL MEDICINE | Admitting: INTERNAL MEDICINE
Payer: MEDICARE

## 2019-01-01 ENCOUNTER — APPOINTMENT (OUTPATIENT)
Dept: GENERAL RADIOLOGY | Age: 79
DRG: 291 | End: 2019-01-01
Payer: MEDICARE

## 2019-01-01 VITALS
RESPIRATION RATE: 18 BRPM | HEIGHT: 66 IN | HEART RATE: 76 BPM | DIASTOLIC BLOOD PRESSURE: 63 MMHG | SYSTOLIC BLOOD PRESSURE: 143 MMHG | OXYGEN SATURATION: 93 % | BODY MASS INDEX: 34.06 KG/M2 | WEIGHT: 211.9 LBS | TEMPERATURE: 98.1 F

## 2019-01-01 DIAGNOSIS — R09.02 HYPOXIA: Primary | ICD-10-CM

## 2019-01-01 LAB
ANION GAP SERPL CALCULATED.3IONS-SCNC: 12 MEQ/L (ref 8–16)
ANION GAP SERPL CALCULATED.3IONS-SCNC: 12 MEQ/L (ref 8–16)
ANION GAP SERPL CALCULATED.3IONS-SCNC: 13 MEQ/L (ref 8–16)
ANION GAP SERPL CALCULATED.3IONS-SCNC: 15 MEQ/L (ref 8–16)
BACTERIA: ABNORMAL
BASOPHILS # BLD: 0.4 %
BASOPHILS # BLD: 0.5 %
BASOPHILS ABSOLUTE: 0 THOU/MM3 (ref 0–0.1)
BASOPHILS ABSOLUTE: 0 THOU/MM3 (ref 0–0.1)
BILIRUBIN URINE: NEGATIVE
BLOOD CULTURE, ROUTINE: NORMAL
BLOOD CULTURE, ROUTINE: NORMAL
BLOOD, URINE: ABNORMAL
BUN BLDV-MCNC: 17 MG/DL (ref 7–22)
BUN BLDV-MCNC: 19 MG/DL (ref 7–22)
BUN BLDV-MCNC: 20 MG/DL (ref 7–22)
BUN BLDV-MCNC: 30 MG/DL (ref 7–22)
CALCIUM SERPL-MCNC: 8.7 MG/DL (ref 8.5–10.5)
CALCIUM SERPL-MCNC: 8.7 MG/DL (ref 8.5–10.5)
CALCIUM SERPL-MCNC: 8.8 MG/DL (ref 8.5–10.5)
CALCIUM SERPL-MCNC: 8.9 MG/DL (ref 8.5–10.5)
CASTS: ABNORMAL /LPF
CASTS: ABNORMAL /LPF
CHARACTER, URINE: ABNORMAL
CHLORIDE BLD-SCNC: 95 MEQ/L (ref 98–111)
CHLORIDE BLD-SCNC: 95 MEQ/L (ref 98–111)
CHLORIDE BLD-SCNC: 98 MEQ/L (ref 98–111)
CHLORIDE BLD-SCNC: 99 MEQ/L (ref 98–111)
CO2: 30 MEQ/L (ref 23–33)
CO2: 31 MEQ/L (ref 23–33)
CO2: 31 MEQ/L (ref 23–33)
CO2: 33 MEQ/L (ref 23–33)
COLOR: YELLOW
CREAT SERPL-MCNC: 1.1 MG/DL (ref 0.4–1.2)
CREAT SERPL-MCNC: 1.2 MG/DL (ref 0.4–1.2)
CREAT SERPL-MCNC: 1.2 MG/DL (ref 0.4–1.2)
CREAT SERPL-MCNC: 1.4 MG/DL (ref 0.4–1.2)
CRYSTALS: ABNORMAL
EKG ATRIAL RATE: 76 BPM
EKG P AXIS: 77 DEGREES
EKG P-R INTERVAL: 130 MS
EKG Q-T INTERVAL: 416 MS
EKG QRS DURATION: 80 MS
EKG QTC CALCULATION (BAZETT): 461 MS
EKG R AXIS: 10 DEGREES
EKG T AXIS: -15 DEGREES
EKG VENTRICULAR RATE: 74 BPM
EOSINOPHIL # BLD: 3.6 %
EOSINOPHIL # BLD: 6.6 %
EOSINOPHILS ABSOLUTE: 0.4 THOU/MM3 (ref 0–0.4)
EOSINOPHILS ABSOLUTE: 0.5 THOU/MM3 (ref 0–0.4)
EPITHELIAL CELLS, UA: ABNORMAL /HPF
ERYTHROCYTE [DISTWIDTH] IN BLOOD BY AUTOMATED COUNT: 16.8 % (ref 11.5–14.5)
ERYTHROCYTE [DISTWIDTH] IN BLOOD BY AUTOMATED COUNT: 17.3 % (ref 11.5–14.5)
ERYTHROCYTE [DISTWIDTH] IN BLOOD BY AUTOMATED COUNT: 56.2 FL (ref 35–45)
ERYTHROCYTE [DISTWIDTH] IN BLOOD BY AUTOMATED COUNT: 62.5 FL (ref 35–45)
FLU A ANTIGEN: NEGATIVE
FLU B ANTIGEN: NEGATIVE
GFR SERPL CREATININE-BSD FRML MDRD: 36 ML/MIN/1.73M2
GFR SERPL CREATININE-BSD FRML MDRD: 43 ML/MIN/1.73M2
GFR SERPL CREATININE-BSD FRML MDRD: 43 ML/MIN/1.73M2
GFR SERPL CREATININE-BSD FRML MDRD: 48 ML/MIN/1.73M2
GLUCOSE BLD-MCNC: 101 MG/DL (ref 70–108)
GLUCOSE BLD-MCNC: 106 MG/DL (ref 70–108)
GLUCOSE BLD-MCNC: 112 MG/DL (ref 70–108)
GLUCOSE BLD-MCNC: 112 MG/DL (ref 70–108)
GLUCOSE BLD-MCNC: 149 MG/DL (ref 70–108)
GLUCOSE, URINE: NEGATIVE MG/DL
HCT VFR BLD CALC: 31.2 % (ref 37–47)
HCT VFR BLD CALC: 31.7 % (ref 37–47)
HEMOGLOBIN: 10.2 GM/DL (ref 12–16)
HEMOGLOBIN: 9.6 GM/DL (ref 12–16)
IMMATURE GRANS (ABS): 0.03 THOU/MM3 (ref 0–0.07)
IMMATURE GRANS (ABS): 0.03 THOU/MM3 (ref 0–0.07)
IMMATURE GRANULOCYTES: 0.3 %
IMMATURE GRANULOCYTES: 0.4 %
KETONES, URINE: NEGATIVE
LEUKOCYTE ESTERASE, URINE: ABNORMAL
LV EF: 55 %
LVEF MODALITY: NORMAL
LYMPHOCYTES # BLD: 18.7 %
LYMPHOCYTES # BLD: 22.1 %
LYMPHOCYTES ABSOLUTE: 1.7 THOU/MM3 (ref 1–4.8)
LYMPHOCYTES ABSOLUTE: 2 THOU/MM3 (ref 1–4.8)
MCH RBC QN AUTO: 29.8 PG (ref 26–33)
MCH RBC QN AUTO: 30.1 PG (ref 26–33)
MCHC RBC AUTO-ENTMCNC: 30.8 GM/DL (ref 32.2–35.5)
MCHC RBC AUTO-ENTMCNC: 32.2 GM/DL (ref 32.2–35.5)
MCV RBC AUTO: 92.7 FL (ref 81–99)
MCV RBC AUTO: 97.8 FL (ref 81–99)
MISCELLANEOUS LAB TEST RESULT: ABNORMAL
MONOCYTES # BLD: 7.3 %
MONOCYTES # BLD: 8.2 %
MONOCYTES ABSOLUTE: 0.6 THOU/MM3 (ref 0.4–1.3)
MONOCYTES ABSOLUTE: 0.8 THOU/MM3 (ref 0.4–1.3)
NITRITE, URINE: NEGATIVE
NUCLEATED RED BLOOD CELLS: 0 /100 WBC
NUCLEATED RED BLOOD CELLS: 0 /100 WBC
ORGANISM: ABNORMAL
OSMOLALITY CALCULATION: 290.1 MOSMOL/KG (ref 275–300)
PH UA: 6 (ref 5–9)
PLATELET # BLD: 264 THOU/MM3 (ref 130–400)
PLATELET # BLD: 268 THOU/MM3 (ref 130–400)
PLATELET ESTIMATE: ADEQUATE
PMV BLD AUTO: 9.1 FL (ref 9.4–12.4)
PMV BLD AUTO: 9.4 FL (ref 9.4–12.4)
POTASSIUM SERPL-SCNC: 3.2 MEQ/L (ref 3.5–5.2)
POTASSIUM SERPL-SCNC: 3.6 MEQ/L (ref 3.5–5.2)
POTASSIUM SERPL-SCNC: 3.7 MEQ/L (ref 3.5–5.2)
POTASSIUM SERPL-SCNC: 3.8 MEQ/L (ref 3.5–5.2)
PRO-BNP: 2318 PG/ML (ref 0–1800)
PROCALCITONIN: 0.13 NG/ML (ref 0.01–0.09)
PROTEIN UA: 30 MG/DL
RBC # BLD: 3.19 MILL/MM3 (ref 4.2–5.4)
RBC # BLD: 3.42 MILL/MM3 (ref 4.2–5.4)
RBC URINE: ABNORMAL /HPF
RENAL EPITHELIAL, UA: ABNORMAL
SCAN OF BLOOD SMEAR: NORMAL
SEG NEUTROPHILS: 62.2 %
SEG NEUTROPHILS: 69.7 %
SEGMENTED NEUTROPHILS ABSOLUTE COUNT: 4.7 THOU/MM3 (ref 1.8–7.7)
SEGMENTED NEUTROPHILS ABSOLUTE COUNT: 7.3 THOU/MM3 (ref 1.8–7.7)
SODIUM BLD-SCNC: 138 MEQ/L (ref 135–145)
SODIUM BLD-SCNC: 140 MEQ/L (ref 135–145)
SODIUM BLD-SCNC: 142 MEQ/L (ref 135–145)
SODIUM BLD-SCNC: 144 MEQ/L (ref 135–145)
SPECIFIC GRAVITY UA: 1.02 (ref 1–1.03)
STOMATOCYTES: ABNORMAL
TROPONIN T: 0.02 NG/ML
URINE CULTURE, ROUTINE: ABNORMAL
UROBILINOGEN, URINE: 1 EU/DL (ref 0–1)
WBC # BLD: 10.5 THOU/MM3 (ref 4.8–10.8)
WBC # BLD: 7.6 THOU/MM3 (ref 4.8–10.8)
WBC UA: ABNORMAL /HPF
YEAST: ABNORMAL

## 2019-01-01 PROCEDURE — 1200000003 HC TELEMETRY R&B

## 2019-01-01 PROCEDURE — 87804 INFLUENZA ASSAY W/OPTIC: CPT

## 2019-01-01 PROCEDURE — 2580000003 HC RX 258: Performed by: INTERNAL MEDICINE

## 2019-01-01 PROCEDURE — 87186 SC STD MICRODIL/AGAR DIL: CPT

## 2019-01-01 PROCEDURE — 94761 N-INVAS EAR/PLS OXIMETRY MLT: CPT

## 2019-01-01 PROCEDURE — 94669 MECHANICAL CHEST WALL OSCILL: CPT

## 2019-01-01 PROCEDURE — 82948 REAGENT STRIP/BLOOD GLUCOSE: CPT

## 2019-01-01 PROCEDURE — 96365 THER/PROPH/DIAG IV INF INIT: CPT

## 2019-01-01 PROCEDURE — 6360000002 HC RX W HCPCS: Performed by: STUDENT IN AN ORGANIZED HEALTH CARE EDUCATION/TRAINING PROGRAM

## 2019-01-01 PROCEDURE — 6370000000 HC RX 637 (ALT 250 FOR IP): Performed by: INTERNAL MEDICINE

## 2019-01-01 PROCEDURE — 84484 ASSAY OF TROPONIN QUANT: CPT

## 2019-01-01 PROCEDURE — 80048 BASIC METABOLIC PNL TOTAL CA: CPT

## 2019-01-01 PROCEDURE — 2500000003 HC RX 250 WO HCPCS: Performed by: INTERNAL MEDICINE

## 2019-01-01 PROCEDURE — 2700000000 HC OXYGEN THERAPY PER DAY

## 2019-01-01 PROCEDURE — 71046 X-RAY EXAM CHEST 2 VIEWS: CPT

## 2019-01-01 PROCEDURE — 2580000003 HC RX 258: Performed by: STUDENT IN AN ORGANIZED HEALTH CARE EDUCATION/TRAINING PROGRAM

## 2019-01-01 PROCEDURE — 36415 COLL VENOUS BLD VENIPUNCTURE: CPT

## 2019-01-01 PROCEDURE — 6360000002 HC RX W HCPCS: Performed by: INTERNAL MEDICINE

## 2019-01-01 PROCEDURE — 99232 SBSQ HOSP IP/OBS MODERATE 35: CPT | Performed by: PHYSICIAN ASSISTANT

## 2019-01-01 PROCEDURE — 94640 AIRWAY INHALATION TREATMENT: CPT

## 2019-01-01 PROCEDURE — 87077 CULTURE AEROBIC IDENTIFY: CPT

## 2019-01-01 PROCEDURE — 85025 COMPLETE CBC W/AUTO DIFF WBC: CPT

## 2019-01-01 PROCEDURE — 87040 BLOOD CULTURE FOR BACTERIA: CPT

## 2019-01-01 PROCEDURE — 94760 N-INVAS EAR/PLS OXIMETRY 1: CPT

## 2019-01-01 PROCEDURE — 93005 ELECTROCARDIOGRAM TRACING: CPT | Performed by: STUDENT IN AN ORGANIZED HEALTH CARE EDUCATION/TRAINING PROGRAM

## 2019-01-01 PROCEDURE — 83880 ASSAY OF NATRIURETIC PEPTIDE: CPT

## 2019-01-01 PROCEDURE — 93010 ELECTROCARDIOGRAM REPORT: CPT | Performed by: NUCLEAR MEDICINE

## 2019-01-01 PROCEDURE — 97110 THERAPEUTIC EXERCISES: CPT

## 2019-01-01 PROCEDURE — 99222 1ST HOSP IP/OBS MODERATE 55: CPT | Performed by: INTERNAL MEDICINE

## 2019-01-01 PROCEDURE — 87086 URINE CULTURE/COLONY COUNT: CPT

## 2019-01-01 PROCEDURE — 81001 URINALYSIS AUTO W/SCOPE: CPT

## 2019-01-01 PROCEDURE — 99285 EMERGENCY DEPT VISIT HI MDM: CPT

## 2019-01-01 PROCEDURE — 97165 OT EVAL LOW COMPLEX 30 MIN: CPT

## 2019-01-01 PROCEDURE — 93306 TTE W/DOPPLER COMPLETE: CPT

## 2019-01-01 PROCEDURE — 2500000003 HC RX 250 WO HCPCS: Performed by: STUDENT IN AN ORGANIZED HEALTH CARE EDUCATION/TRAINING PROGRAM

## 2019-01-01 PROCEDURE — 84145 PROCALCITONIN (PCT): CPT

## 2019-01-01 RX ORDER — VITAMIN B COMPLEX
5000 TABLET ORAL DAILY
Status: DISCONTINUED | OUTPATIENT
Start: 2019-01-01 | End: 2019-01-01 | Stop reason: HOSPADM

## 2019-01-01 RX ORDER — ACETAMINOPHEN 325 MG/1
650 TABLET ORAL EVERY 4 HOURS PRN
Status: DISCONTINUED | OUTPATIENT
Start: 2019-01-01 | End: 2019-01-01 | Stop reason: HOSPADM

## 2019-01-01 RX ORDER — DIVALPROEX SODIUM 125 MG/1
125 CAPSULE, COATED PELLETS ORAL 2 TIMES DAILY
Status: DISCONTINUED | OUTPATIENT
Start: 2019-01-01 | End: 2019-01-01 | Stop reason: HOSPADM

## 2019-01-01 RX ORDER — 0.9 % SODIUM CHLORIDE 0.9 %
500 INTRAVENOUS SOLUTION INTRAVENOUS ONCE
Status: COMPLETED | OUTPATIENT
Start: 2019-01-01 | End: 2019-01-01

## 2019-01-01 RX ORDER — SODIUM CHLORIDE 9 MG/ML
INJECTION, SOLUTION INTRAVENOUS CONTINUOUS
Status: DISCONTINUED | OUTPATIENT
Start: 2019-01-01 | End: 2019-01-01

## 2019-01-01 RX ORDER — DONEPEZIL HYDROCHLORIDE 10 MG/1
20 TABLET, FILM COATED ORAL DAILY
Status: DISCONTINUED | OUTPATIENT
Start: 2019-01-01 | End: 2019-01-01 | Stop reason: HOSPADM

## 2019-01-01 RX ORDER — POTASSIUM CHLORIDE 20 MEQ/1
40 TABLET, EXTENDED RELEASE ORAL ONCE
Status: COMPLETED | OUTPATIENT
Start: 2019-01-01 | End: 2019-01-01

## 2019-01-01 RX ORDER — GUAIFENESIN/DEXTROMETHORPHAN 100-10MG/5
5 SYRUP ORAL EVERY 4 HOURS PRN
Status: DISCONTINUED | OUTPATIENT
Start: 2019-01-01 | End: 2019-01-01 | Stop reason: HOSPADM

## 2019-01-01 RX ORDER — AZITHROMYCIN 500 MG/1
500 TABLET, FILM COATED ORAL DAILY
Qty: 1 PACKET | Refills: 0 | DISCHARGE
Start: 2019-01-01 | End: 2019-01-01

## 2019-01-01 RX ORDER — MIDODRINE HYDROCHLORIDE 5 MG/1
5 TABLET ORAL EVERY 8 HOURS PRN
Status: DISCONTINUED | OUTPATIENT
Start: 2019-01-01 | End: 2019-01-01 | Stop reason: HOSPADM

## 2019-01-01 RX ORDER — ATORVASTATIN CALCIUM 10 MG/1
10 TABLET, FILM COATED ORAL NIGHTLY
Status: DISCONTINUED | OUTPATIENT
Start: 2019-01-01 | End: 2019-01-01 | Stop reason: HOSPADM

## 2019-01-01 RX ORDER — LOPERAMIDE HYDROCHLORIDE 2 MG/1
2 CAPSULE ORAL 4 TIMES DAILY PRN
Status: ON HOLD | COMMUNITY
End: 2019-01-01 | Stop reason: HOSPADM

## 2019-01-01 RX ORDER — BUMETANIDE 0.5 MG/1
0.5 TABLET ORAL 2 TIMES DAILY
Status: DISCONTINUED | OUTPATIENT
Start: 2019-01-01 | End: 2019-01-01

## 2019-01-01 RX ORDER — RISPERIDONE 0.25 MG/1
0.12 TABLET, FILM COATED ORAL EVERY MORNING
Status: DISCONTINUED | OUTPATIENT
Start: 2019-01-01 | End: 2019-01-01 | Stop reason: HOSPADM

## 2019-01-01 RX ORDER — ASPIRIN 81 MG/1
81 TABLET ORAL DAILY
Status: DISCONTINUED | OUTPATIENT
Start: 2019-01-01 | End: 2019-01-01 | Stop reason: HOSPADM

## 2019-01-01 RX ORDER — BUMETANIDE 0.25 MG/ML
0.5 INJECTION, SOLUTION INTRAMUSCULAR; INTRAVENOUS 2 TIMES DAILY
Status: DISCONTINUED | OUTPATIENT
Start: 2019-01-01 | End: 2019-01-01 | Stop reason: HOSPADM

## 2019-01-01 RX ORDER — SUCRALFATE ORAL 1 G/10ML
1 SUSPENSION ORAL 4 TIMES DAILY
COMMUNITY

## 2019-01-01 RX ORDER — VENLAFAXINE 37.5 MG/1
37.5 TABLET ORAL 2 TIMES DAILY
Status: DISCONTINUED | OUTPATIENT
Start: 2019-01-01 | End: 2019-01-01 | Stop reason: HOSPADM

## 2019-01-01 RX ORDER — BUMETANIDE 0.25 MG/ML
1 INJECTION, SOLUTION INTRAMUSCULAR; INTRAVENOUS ONCE
Status: COMPLETED | OUTPATIENT
Start: 2019-01-01 | End: 2019-01-01

## 2019-01-01 RX ORDER — LANOLIN ALCOHOL/MO/W.PET/CERES
3 CREAM (GRAM) TOPICAL NIGHTLY PRN
COMMUNITY

## 2019-01-01 RX ORDER — IPRATROPIUM BROMIDE AND ALBUTEROL SULFATE 2.5; .5 MG/3ML; MG/3ML
3 SOLUTION RESPIRATORY (INHALATION)
Status: DISCONTINUED | OUTPATIENT
Start: 2019-01-01 | End: 2019-01-01 | Stop reason: DRUGHIGH

## 2019-01-01 RX ORDER — SODIUM BICARBONATE 650 MG/1
650 TABLET ORAL 3 TIMES DAILY
Status: DISCONTINUED | OUTPATIENT
Start: 2019-01-01 | End: 2019-01-01 | Stop reason: HOSPADM

## 2019-01-01 RX ORDER — CEFUROXIME AXETIL 250 MG/1
250 TABLET ORAL 2 TIMES DAILY
Qty: 10 TABLET | Refills: 0 | DISCHARGE
Start: 2019-01-01 | End: 2019-01-01

## 2019-01-01 RX ORDER — IPRATROPIUM BROMIDE AND ALBUTEROL SULFATE 2.5; .5 MG/3ML; MG/3ML
1 SOLUTION RESPIRATORY (INHALATION) EVERY 4 HOURS PRN
Status: DISCONTINUED | OUTPATIENT
Start: 2019-01-01 | End: 2019-01-01 | Stop reason: HOSPADM

## 2019-01-01 RX ORDER — IPRATROPIUM BROMIDE AND ALBUTEROL SULFATE 2.5; .5 MG/3ML; MG/3ML
1 SOLUTION RESPIRATORY (INHALATION) EVERY 4 HOURS
Status: DISCONTINUED | OUTPATIENT
Start: 2019-01-01 | End: 2019-01-01 | Stop reason: HOSPADM

## 2019-01-01 RX ORDER — ALBUTEROL SULFATE 2.5 MG/3ML
2.5 SOLUTION RESPIRATORY (INHALATION) 4 TIMES DAILY
Status: DISCONTINUED | OUTPATIENT
Start: 2019-01-01 | End: 2019-01-01

## 2019-01-01 RX ORDER — ATORVASTATIN CALCIUM 10 MG/1
10 TABLET, FILM COATED ORAL NIGHTLY
Qty: 30 TABLET | Refills: 3 | DISCHARGE
Start: 2019-01-01

## 2019-01-01 RX ORDER — RISPERIDONE 0.25 MG/1
0.25 TABLET, FILM COATED ORAL NIGHTLY
Status: DISCONTINUED | OUTPATIENT
Start: 2019-01-01 | End: 2019-01-01 | Stop reason: HOSPADM

## 2019-01-01 RX ORDER — ASPIRIN 81 MG/1
81 TABLET ORAL DAILY
Qty: 30 TABLET | Refills: 3 | DISCHARGE
Start: 2019-01-01

## 2019-01-01 RX ORDER — PANTOPRAZOLE SODIUM 40 MG/1
40 TABLET, DELAYED RELEASE ORAL
Status: DISCONTINUED | OUTPATIENT
Start: 2019-01-01 | End: 2019-01-01 | Stop reason: HOSPADM

## 2019-01-01 RX ORDER — ALBUTEROL SULFATE 2.5 MG/3ML
2.5 SOLUTION RESPIRATORY (INHALATION) 4 TIMES DAILY PRN
Status: DISCONTINUED | OUTPATIENT
Start: 2019-01-01 | End: 2019-01-01

## 2019-01-01 RX ORDER — BUMETANIDE 1 MG/1
2 TABLET ORAL DAILY
COMMUNITY

## 2019-01-01 RX ORDER — ATENOLOL 25 MG/1
25 TABLET ORAL DAILY
Status: DISCONTINUED | OUTPATIENT
Start: 2019-01-01 | End: 2019-01-01 | Stop reason: HOSPADM

## 2019-01-01 RX ORDER — POTASSIUM CHLORIDE 20 MEQ/1
20 TABLET, EXTENDED RELEASE ORAL DAILY
COMMUNITY

## 2019-01-01 RX ADMIN — BUMETANIDE 0.5 MG: 0.25 INJECTION INTRAMUSCULAR; INTRAVENOUS at 10:31

## 2019-01-01 RX ADMIN — PANTOPRAZOLE SODIUM 40 MG: 40 TABLET, DELAYED RELEASE ORAL at 06:07

## 2019-01-01 RX ADMIN — IPRATROPIUM BROMIDE AND ALBUTEROL SULFATE 1 AMPULE: .5; 3 SOLUTION RESPIRATORY (INHALATION) at 04:48

## 2019-01-01 RX ADMIN — IPRATROPIUM BROMIDE AND ALBUTEROL SULFATE 1 AMPULE: .5; 3 SOLUTION RESPIRATORY (INHALATION) at 00:40

## 2019-01-01 RX ADMIN — SODIUM BICARBONATE 650 MG: 650 TABLET ORAL at 09:22

## 2019-01-01 RX ADMIN — VITAMIN D, TAB 1000IU (100/BT) 5000 UNITS: 25 TAB at 08:44

## 2019-01-01 RX ADMIN — ALBUTEROL SULFATE 2.5 MG: 2.5 SOLUTION RESPIRATORY (INHALATION) at 13:52

## 2019-01-01 RX ADMIN — GUAIFENESIN AND DEXTROMETHORPHAN 5 ML: 100; 10 SYRUP ORAL at 09:22

## 2019-01-01 RX ADMIN — SODIUM BICARBONATE 650 MG: 650 TABLET ORAL at 15:18

## 2019-01-01 RX ADMIN — CEFTRIAXONE SODIUM 1 G: 1 INJECTION, POWDER, FOR SOLUTION INTRAMUSCULAR; INTRAVENOUS at 22:10

## 2019-01-01 RX ADMIN — VENLAFAXINE 37.5 MG: 37.5 TABLET ORAL at 22:09

## 2019-01-01 RX ADMIN — RISPERIDONE 0.12 MG: 0.25 TABLET ORAL at 09:22

## 2019-01-01 RX ADMIN — SODIUM BICARBONATE 650 MG: 650 TABLET ORAL at 21:19

## 2019-01-01 RX ADMIN — VENLAFAXINE 37.5 MG: 37.5 TABLET ORAL at 07:22

## 2019-01-01 RX ADMIN — RISPERIDONE 0.12 MG: 0.25 TABLET ORAL at 08:46

## 2019-01-01 RX ADMIN — IPRATROPIUM BROMIDE AND ALBUTEROL SULFATE 1 AMPULE: .5; 3 SOLUTION RESPIRATORY (INHALATION) at 12:26

## 2019-01-01 RX ADMIN — ASPIRIN 81 MG: 81 TABLET ORAL at 09:22

## 2019-01-01 RX ADMIN — IPRATROPIUM BROMIDE AND ALBUTEROL SULFATE 1 AMPULE: .5; 3 SOLUTION RESPIRATORY (INHALATION) at 20:51

## 2019-01-01 RX ADMIN — RISPERIDONE 0.12 MG: 0.25 TABLET ORAL at 07:23

## 2019-01-01 RX ADMIN — SODIUM BICARBONATE 650 MG: 650 TABLET ORAL at 08:44

## 2019-01-01 RX ADMIN — DONEPEZIL HYDROCHLORIDE 20 MG: 10 TABLET, FILM COATED ORAL at 07:22

## 2019-01-01 RX ADMIN — VENLAFAXINE 37.5 MG: 37.5 TABLET ORAL at 21:19

## 2019-01-01 RX ADMIN — CEFTRIAXONE SODIUM 1 G: 1 INJECTION, POWDER, FOR SOLUTION INTRAMUSCULAR; INTRAVENOUS at 21:20

## 2019-01-01 RX ADMIN — IPRATROPIUM BROMIDE AND ALBUTEROL SULFATE 1 AMPULE: .5; 3 SOLUTION RESPIRATORY (INHALATION) at 00:44

## 2019-01-01 RX ADMIN — ATORVASTATIN CALCIUM 10 MG: 10 TABLET, FILM COATED ORAL at 22:10

## 2019-01-01 RX ADMIN — SODIUM BICARBONATE 650 MG: 650 TABLET ORAL at 22:09

## 2019-01-01 RX ADMIN — SODIUM BICARBONATE 650 MG: 650 TABLET ORAL at 07:22

## 2019-01-01 RX ADMIN — PANTOPRAZOLE SODIUM 40 MG: 40 TABLET, DELAYED RELEASE ORAL at 04:14

## 2019-01-01 RX ADMIN — POTASSIUM CHLORIDE 40 MEQ: 1500 TABLET, EXTENDED RELEASE ORAL at 08:49

## 2019-01-01 RX ADMIN — GUAIFENESIN AND DEXTROMETHORPHAN 5 ML: 100; 10 SYRUP ORAL at 16:19

## 2019-01-01 RX ADMIN — DONEPEZIL HYDROCHLORIDE 20 MG: 10 TABLET, FILM COATED ORAL at 09:22

## 2019-01-01 RX ADMIN — IPRATROPIUM BROMIDE AND ALBUTEROL SULFATE 1 AMPULE: .5; 3 SOLUTION RESPIRATORY (INHALATION) at 08:49

## 2019-01-01 RX ADMIN — ENOXAPARIN SODIUM 30 MG: 30 INJECTION SUBCUTANEOUS at 09:22

## 2019-01-01 RX ADMIN — GUAIFENESIN AND DEXTROMETHORPHAN 5 ML: 100; 10 SYRUP ORAL at 11:18

## 2019-01-01 RX ADMIN — ATENOLOL 25 MG: 25 TABLET ORAL at 07:22

## 2019-01-01 RX ADMIN — DIVALPROEX SODIUM 125 MG: 125 CAPSULE ORAL at 22:09

## 2019-01-01 RX ADMIN — ATENOLOL 25 MG: 25 TABLET ORAL at 09:22

## 2019-01-01 RX ADMIN — CEFTRIAXONE SODIUM 1 G: 1 INJECTION, POWDER, FOR SOLUTION INTRAMUSCULAR; INTRAVENOUS at 22:02

## 2019-01-01 RX ADMIN — PANTOPRAZOLE SODIUM 40 MG: 40 TABLET, DELAYED RELEASE ORAL at 04:43

## 2019-01-01 RX ADMIN — RISPERIDONE 0.25 MG: 0.25 TABLET ORAL at 22:09

## 2019-01-01 RX ADMIN — ATORVASTATIN CALCIUM 10 MG: 10 TABLET, FILM COATED ORAL at 21:31

## 2019-01-01 RX ADMIN — IPRATROPIUM BROMIDE AND ALBUTEROL SULFATE 1 AMPULE: .5; 3 SOLUTION RESPIRATORY (INHALATION) at 16:46

## 2019-01-01 RX ADMIN — RISPERIDONE 0.25 MG: 0.25 TABLET ORAL at 21:19

## 2019-01-01 RX ADMIN — SODIUM BICARBONATE 650 MG: 650 TABLET ORAL at 13:54

## 2019-01-01 RX ADMIN — IPRATROPIUM BROMIDE AND ALBUTEROL SULFATE 1 AMPULE: .5; 3 SOLUTION RESPIRATORY (INHALATION) at 05:05

## 2019-01-01 RX ADMIN — AZITHROMYCIN DIHYDRATE 500 MG: 500 INJECTION, POWDER, LYOPHILIZED, FOR SOLUTION INTRAVENOUS at 13:55

## 2019-01-01 RX ADMIN — DIVALPROEX SODIUM 125 MG: 125 CAPSULE ORAL at 22:10

## 2019-01-01 RX ADMIN — BUMETANIDE 0.5 MG: 0.5 TABLET ORAL at 09:21

## 2019-01-01 RX ADMIN — SODIUM BICARBONATE 650 MG: 650 TABLET ORAL at 22:10

## 2019-01-01 RX ADMIN — IPRATROPIUM BROMIDE AND ALBUTEROL SULFATE 1 AMPULE: .5; 3 SOLUTION RESPIRATORY (INHALATION) at 12:37

## 2019-01-01 RX ADMIN — VITAMIN D, TAB 1000IU (100/BT) 5000 UNITS: 25 TAB at 09:22

## 2019-01-01 RX ADMIN — AZITHROMYCIN DIHYDRATE 500 MG: 500 INJECTION, POWDER, LYOPHILIZED, FOR SOLUTION INTRAVENOUS at 15:18

## 2019-01-01 RX ADMIN — IPRATROPIUM BROMIDE AND ALBUTEROL SULFATE 1 AMPULE: .5; 3 SOLUTION RESPIRATORY (INHALATION) at 06:14

## 2019-01-01 RX ADMIN — VENLAFAXINE 37.5 MG: 37.5 TABLET ORAL at 22:10

## 2019-01-01 RX ADMIN — DIVALPROEX SODIUM 125 MG: 125 CAPSULE ORAL at 08:44

## 2019-01-01 RX ADMIN — ASPIRIN 81 MG: 81 TABLET ORAL at 08:43

## 2019-01-01 RX ADMIN — ATENOLOL 25 MG: 25 TABLET ORAL at 08:47

## 2019-01-01 RX ADMIN — IPRATROPIUM BROMIDE AND ALBUTEROL SULFATE 1 AMPULE: .5; 3 SOLUTION RESPIRATORY (INHALATION) at 09:38

## 2019-01-01 RX ADMIN — VITAMIN D, TAB 1000IU (100/BT) 5000 UNITS: 25 TAB at 07:22

## 2019-01-01 RX ADMIN — DIVALPROEX SODIUM 125 MG: 125 CAPSULE ORAL at 21:19

## 2019-01-01 RX ADMIN — RISPERIDONE 0.25 MG: 0.25 TABLET ORAL at 22:10

## 2019-01-01 RX ADMIN — IPRATROPIUM BROMIDE AND ALBUTEROL SULFATE 1 AMPULE: .5; 3 SOLUTION RESPIRATORY (INHALATION) at 14:03

## 2019-01-01 RX ADMIN — BUMETANIDE 0.5 MG: 0.25 INJECTION INTRAMUSCULAR; INTRAVENOUS at 07:22

## 2019-01-01 RX ADMIN — BUMETANIDE 0.5 MG: 0.25 INJECTION INTRAMUSCULAR; INTRAVENOUS at 22:07

## 2019-01-01 RX ADMIN — VENLAFAXINE 37.5 MG: 37.5 TABLET ORAL at 08:46

## 2019-01-01 RX ADMIN — SODIUM CHLORIDE 500 ML: 9 INJECTION, SOLUTION INTRAVENOUS at 15:11

## 2019-01-01 RX ADMIN — VENLAFAXINE 37.5 MG: 37.5 TABLET ORAL at 09:22

## 2019-01-01 RX ADMIN — ENOXAPARIN SODIUM 30 MG: 30 INJECTION SUBCUTANEOUS at 08:44

## 2019-01-01 RX ADMIN — DIVALPROEX SODIUM 125 MG: 125 CAPSULE ORAL at 09:22

## 2019-01-01 RX ADMIN — ENOXAPARIN SODIUM 30 MG: 30 INJECTION SUBCUTANEOUS at 07:23

## 2019-01-01 RX ADMIN — IPRATROPIUM BROMIDE AND ALBUTEROL SULFATE 1 AMPULE: .5; 3 SOLUTION RESPIRATORY (INHALATION) at 09:24

## 2019-01-01 RX ADMIN — BUMETANIDE 1 MG: 0.25 INJECTION INTRAMUSCULAR; INTRAVENOUS at 16:38

## 2019-01-01 RX ADMIN — BUMETANIDE 0.5 MG: 0.25 INJECTION INTRAMUSCULAR; INTRAVENOUS at 08:44

## 2019-01-01 RX ADMIN — ASPIRIN 81 MG: 81 TABLET ORAL at 07:22

## 2019-01-01 RX ADMIN — ACETAMINOPHEN 650 MG: 325 TABLET ORAL at 22:06

## 2019-01-01 RX ADMIN — ACETAMINOPHEN 650 MG: 325 TABLET ORAL at 08:44

## 2019-01-01 RX ADMIN — IPRATROPIUM BROMIDE AND ALBUTEROL SULFATE 1 AMPULE: .5; 3 SOLUTION RESPIRATORY (INHALATION) at 20:49

## 2019-01-01 RX ADMIN — IPRATROPIUM BROMIDE AND ALBUTEROL SULFATE 1 AMPULE: .5; 3 SOLUTION RESPIRATORY (INHALATION) at 15:48

## 2019-01-01 RX ADMIN — DONEPEZIL HYDROCHLORIDE 20 MG: 10 TABLET, FILM COATED ORAL at 08:48

## 2019-01-01 RX ADMIN — DIVALPROEX SODIUM 125 MG: 125 CAPSULE ORAL at 07:22

## 2019-01-01 RX ADMIN — AZITHROMYCIN MONOHYDRATE 500 MG: 500 INJECTION, POWDER, LYOPHILIZED, FOR SOLUTION INTRAVENOUS at 15:11

## 2019-01-01 RX ADMIN — BUMETANIDE 0.5 MG: 0.25 INJECTION INTRAMUSCULAR; INTRAVENOUS at 21:16

## 2019-01-01 ASSESSMENT — PAIN DESCRIPTION - FREQUENCY: FREQUENCY: INTERMITTENT

## 2019-01-01 ASSESSMENT — PAIN SCALES - GENERAL
PAINLEVEL_OUTOF10: 0
PAINLEVEL_OUTOF10: 4
PAINLEVEL_OUTOF10: 0

## 2019-01-01 ASSESSMENT — PAIN DESCRIPTION - PROGRESSION: CLINICAL_PROGRESSION: NOT CHANGED

## 2019-01-01 ASSESSMENT — PAIN - FUNCTIONAL ASSESSMENT: PAIN_FUNCTIONAL_ASSESSMENT: ACTIVITIES ARE NOT PREVENTED

## 2019-01-01 ASSESSMENT — PAIN DESCRIPTION - PAIN TYPE: TYPE: CHRONIC PAIN

## 2019-01-01 ASSESSMENT — PAIN DESCRIPTION - LOCATION: LOCATION: OTHER (COMMENT)

## 2019-01-01 ASSESSMENT — PAIN DESCRIPTION - ONSET: ONSET: ON-GOING

## 2019-01-01 ASSESSMENT — PAIN DESCRIPTION - ORIENTATION: ORIENTATION: OTHER (COMMENT)

## 2019-01-01 ASSESSMENT — PAIN DESCRIPTION - DESCRIPTORS: DESCRIPTORS: ACHING

## 2019-05-16 ENCOUNTER — APPOINTMENT (OUTPATIENT)
Dept: GENERAL RADIOLOGY | Age: 79
DRG: 682 | End: 2019-05-16
Payer: MEDICARE

## 2019-05-16 ENCOUNTER — HOSPITAL ENCOUNTER (INPATIENT)
Age: 79
LOS: 8 days | Discharge: NURSING, REHAB OR LONG-TERM CARE | DRG: 682 | End: 2019-05-24
Attending: EMERGENCY MEDICINE | Admitting: INTERNAL MEDICINE
Payer: MEDICARE

## 2019-05-16 ENCOUNTER — APPOINTMENT (OUTPATIENT)
Dept: ULTRASOUND IMAGING | Age: 79
DRG: 682 | End: 2019-05-16
Payer: MEDICARE

## 2019-05-16 DIAGNOSIS — N17.9 ACUTE RENAL FAILURE, UNSPECIFIED ACUTE RENAL FAILURE TYPE (HCC): Primary | ICD-10-CM

## 2019-05-16 LAB
AMORPHOUS: ABNORMAL
ANION GAP SERPL CALCULATED.3IONS-SCNC: 15 MEQ/L (ref 8–16)
BACTERIA: ABNORMAL /HPF
BASOPHILS # BLD: 0.3 %
BASOPHILS ABSOLUTE: 0 THOU/MM3 (ref 0–0.1)
BILIRUBIN URINE: ABNORMAL
BLOOD, URINE: ABNORMAL
BUN BLDV-MCNC: 131 MG/DL (ref 7–22)
CALCIUM SERPL-MCNC: 8.1 MG/DL (ref 8.5–10.5)
CASTS 2: ABNORMAL /LPF
CASTS UA: ABNORMAL /LPF
CHARACTER, URINE: ABNORMAL
CHLORIDE BLD-SCNC: 117 MEQ/L (ref 98–111)
CO2: 15 MEQ/L (ref 23–33)
COLOR: ABNORMAL
CREAT SERPL-MCNC: 6.4 MG/DL (ref 0.4–1.2)
CRYSTALS, UA: ABNORMAL
EOSINOPHIL # BLD: 1 %
EOSINOPHILS ABSOLUTE: 0.1 THOU/MM3 (ref 0–0.4)
EPITHELIAL CELLS, UA: ABNORMAL /HPF
ERYTHROCYTE [DISTWIDTH] IN BLOOD BY AUTOMATED COUNT: 16.4 % (ref 11.5–14.5)
ERYTHROCYTE [DISTWIDTH] IN BLOOD BY AUTOMATED COUNT: 58 FL (ref 35–45)
GFR SERPL CREATININE-BSD FRML MDRD: 6 ML/MIN/1.73M2
GLUCOSE BLD-MCNC: 108 MG/DL (ref 70–108)
GLUCOSE URINE: NEGATIVE MG/DL
HCT VFR BLD CALC: 35.5 % (ref 37–47)
HEMOGLOBIN: 10.2 GM/DL (ref 12–16)
HYPOCHROMIA: PRESENT
ICTOTEST: NEGATIVE
IMMATURE GRANS (ABS): 0.13 THOU/MM3 (ref 0–0.07)
IMMATURE GRANULOCYTES: 1.1 %
KETONES, URINE: ABNORMAL
LEUKOCYTE ESTERASE, URINE: ABNORMAL
LYMPHOCYTES # BLD: 11.5 %
LYMPHOCYTES ABSOLUTE: 1.4 THOU/MM3 (ref 1–4.8)
MCH RBC QN AUTO: 27.6 PG (ref 26–33)
MCHC RBC AUTO-ENTMCNC: 28.7 GM/DL (ref 32.2–35.5)
MCV RBC AUTO: 95.9 FL (ref 81–99)
MISCELLANEOUS 2: ABNORMAL
MONOCYTES # BLD: 9.4 %
MONOCYTES ABSOLUTE: 1.2 THOU/MM3 (ref 0.4–1.3)
MUCUS: ABNORMAL
NITRITE, URINE: POSITIVE
NUCLEATED RED BLOOD CELLS: 0 /100 WBC
OSMOLALITY CALCULATION: 335.2 MOSMOL/KG (ref 275–300)
PH UA: 5 (ref 5–9)
PLATELET # BLD: 293 THOU/MM3 (ref 130–400)
PLATELET ESTIMATE: ADEQUATE
PMV BLD AUTO: 9.9 FL (ref 9.4–12.4)
POTASSIUM REFLEX MAGNESIUM: 6 MEQ/L (ref 3.5–5.2)
PROTEIN UA: 30
RBC # BLD: 3.7 MILL/MM3 (ref 4.2–5.4)
RBC URINE: ABNORMAL /HPF
RENAL EPITHELIAL, UA: ABNORMAL
SCAN OF BLOOD SMEAR: NORMAL
SEG NEUTROPHILS: 76.7 %
SEGMENTED NEUTROPHILS ABSOLUTE COUNT: 9.5 THOU/MM3 (ref 1.8–7.7)
SODIUM BLD-SCNC: 147 MEQ/L (ref 135–145)
SPECIFIC GRAVITY, URINE: 1.02 (ref 1–1.03)
UROBILINOGEN, URINE: 0.2 EU/DL (ref 0–1)
WBC # BLD: 12.4 THOU/MM3 (ref 4.8–10.8)
WBC UA: > 200 /HPF
YEAST: ABNORMAL

## 2019-05-16 PROCEDURE — 87086 URINE CULTURE/COLONY COUNT: CPT

## 2019-05-16 PROCEDURE — 81001 URINALYSIS AUTO W/SCOPE: CPT

## 2019-05-16 PROCEDURE — 87186 SC STD MICRODIL/AGAR DIL: CPT

## 2019-05-16 PROCEDURE — 6360000002 HC RX W HCPCS: Performed by: INTERNAL MEDICINE

## 2019-05-16 PROCEDURE — 99285 EMERGENCY DEPT VISIT HI MDM: CPT

## 2019-05-16 PROCEDURE — 6360000002 HC RX W HCPCS: Performed by: EMERGENCY MEDICINE

## 2019-05-16 PROCEDURE — 71045 X-RAY EXAM CHEST 1 VIEW: CPT

## 2019-05-16 PROCEDURE — 36415 COLL VENOUS BLD VENIPUNCTURE: CPT

## 2019-05-16 PROCEDURE — 85025 COMPLETE CBC W/AUTO DIFF WBC: CPT

## 2019-05-16 PROCEDURE — 87184 SC STD DISK METHOD PER PLATE: CPT

## 2019-05-16 PROCEDURE — 87077 CULTURE AEROBIC IDENTIFY: CPT

## 2019-05-16 PROCEDURE — 96374 THER/PROPH/DIAG INJ IV PUSH: CPT

## 2019-05-16 PROCEDURE — 80048 BASIC METABOLIC PNL TOTAL CA: CPT

## 2019-05-16 PROCEDURE — 2580000003 HC RX 258: Performed by: EMERGENCY MEDICINE

## 2019-05-16 PROCEDURE — 2500000003 HC RX 250 WO HCPCS: Performed by: EMERGENCY MEDICINE

## 2019-05-16 PROCEDURE — 82043 UR ALBUMIN QUANTITATIVE: CPT

## 2019-05-16 PROCEDURE — 6370000000 HC RX 637 (ALT 250 FOR IP): Performed by: INTERNAL MEDICINE

## 2019-05-16 PROCEDURE — 1200000003 HC TELEMETRY R&B

## 2019-05-16 RX ORDER — SODIUM CHLORIDE 0.9 % (FLUSH) 0.9 %
10 SYRINGE (ML) INJECTION EVERY 12 HOURS SCHEDULED
Status: DISCONTINUED | OUTPATIENT
Start: 2019-05-16 | End: 2019-05-24 | Stop reason: HOSPADM

## 2019-05-16 RX ORDER — ATENOLOL 50 MG/1
50 TABLET ORAL DAILY
Status: DISCONTINUED | OUTPATIENT
Start: 2019-05-16 | End: 2019-05-17

## 2019-05-16 RX ORDER — METRONIDAZOLE 500 MG/1
500 TABLET ORAL 3 TIMES DAILY
Status: ON HOLD | COMMUNITY
Start: 2019-05-10 | End: 2019-05-24

## 2019-05-16 RX ORDER — DOCUSATE SODIUM 100 MG/1
100 CAPSULE, LIQUID FILLED ORAL 2 TIMES DAILY
Status: DISCONTINUED | OUTPATIENT
Start: 2019-05-16 | End: 2019-05-24 | Stop reason: HOSPADM

## 2019-05-16 RX ORDER — FUROSEMIDE 20 MG/1
20 TABLET ORAL 2 TIMES DAILY
Status: ON HOLD | COMMUNITY
End: 2019-05-21

## 2019-05-16 RX ORDER — LOSARTAN POTASSIUM AND HYDROCHLOROTHIAZIDE 12.5; 1 MG/1; MG/1
1 TABLET ORAL DAILY
Status: ON HOLD | COMMUNITY
End: 2019-05-24 | Stop reason: HOSPADM

## 2019-05-16 RX ORDER — POTASSIUM CHLORIDE 1.5 G/1.77G
20 POWDER, FOR SOLUTION ORAL 2 TIMES DAILY
Status: ON HOLD | COMMUNITY
End: 2019-05-21

## 2019-05-16 RX ORDER — LORAZEPAM 0.5 MG/1
0.5 TABLET ORAL 2 TIMES DAILY
Status: ON HOLD | COMMUNITY
End: 2019-05-24 | Stop reason: HOSPADM

## 2019-05-16 RX ORDER — VENLAFAXINE HYDROCHLORIDE 75 MG/1
75 CAPSULE, EXTENDED RELEASE ORAL
Status: DISCONTINUED | OUTPATIENT
Start: 2019-05-17 | End: 2019-05-21 | Stop reason: DRUGHIGH

## 2019-05-16 RX ORDER — SODIUM CHLORIDE 0.9 % (FLUSH) 0.9 %
10 SYRINGE (ML) INJECTION PRN
Status: DISCONTINUED | OUTPATIENT
Start: 2019-05-16 | End: 2019-05-24 | Stop reason: HOSPADM

## 2019-05-16 RX ORDER — IPRATROPIUM BROMIDE AND ALBUTEROL SULFATE 2.5; .5 MG/3ML; MG/3ML
1 SOLUTION RESPIRATORY (INHALATION)
Status: DISCONTINUED | OUTPATIENT
Start: 2019-05-17 | End: 2019-05-24 | Stop reason: HOSPADM

## 2019-05-16 RX ORDER — DIVALPROEX SODIUM 125 MG/1
125 CAPSULE, COATED PELLETS ORAL 2 TIMES DAILY
COMMUNITY

## 2019-05-16 RX ORDER — 0.9 % SODIUM CHLORIDE 0.9 %
1000 INTRAVENOUS SOLUTION INTRAVENOUS ONCE
Status: COMPLETED | OUTPATIENT
Start: 2019-05-16 | End: 2019-05-16

## 2019-05-16 RX ORDER — RANITIDINE 15 MG/ML
150 SOLUTION ORAL 2 TIMES DAILY
Status: ON HOLD | COMMUNITY
End: 2019-05-24 | Stop reason: HOSPADM

## 2019-05-16 RX ORDER — HYDRALAZINE HYDROCHLORIDE 10 MG/1
10 TABLET, FILM COATED ORAL 3 TIMES DAILY
Status: ON HOLD | COMMUNITY
End: 2019-05-24 | Stop reason: HOSPADM

## 2019-05-16 RX ORDER — RISPERIDONE 0.25 MG/1
0.25 TABLET, FILM COATED ORAL 2 TIMES DAILY
Status: DISCONTINUED | OUTPATIENT
Start: 2019-05-16 | End: 2019-05-21 | Stop reason: DRUGHIGH

## 2019-05-16 RX ORDER — DONEPEZIL HYDROCHLORIDE 10 MG/1
20 TABLET, FILM COATED ORAL DAILY
Status: DISCONTINUED | OUTPATIENT
Start: 2019-05-17 | End: 2019-05-24 | Stop reason: HOSPADM

## 2019-05-16 RX ORDER — ACETAMINOPHEN 325 MG/1
650 TABLET ORAL EVERY 4 HOURS PRN
COMMUNITY

## 2019-05-16 RX ORDER — ONDANSETRON 2 MG/ML
4 INJECTION INTRAMUSCULAR; INTRAVENOUS EVERY 6 HOURS PRN
Status: DISCONTINUED | OUTPATIENT
Start: 2019-05-16 | End: 2019-05-24 | Stop reason: HOSPADM

## 2019-05-16 RX ORDER — HEPARIN SODIUM 5000 [USP'U]/ML
5000 INJECTION, SOLUTION INTRAVENOUS; SUBCUTANEOUS EVERY 8 HOURS SCHEDULED
Status: DISCONTINUED | OUTPATIENT
Start: 2019-05-16 | End: 2019-05-24 | Stop reason: HOSPADM

## 2019-05-16 RX ORDER — ACETAMINOPHEN 325 MG/1
650 TABLET ORAL EVERY 4 HOURS PRN
Status: DISCONTINUED | OUTPATIENT
Start: 2019-05-16 | End: 2019-05-24 | Stop reason: HOSPADM

## 2019-05-16 RX ORDER — OMEPRAZOLE 40 MG/1
40 CAPSULE, DELAYED RELEASE ORAL DAILY
COMMUNITY

## 2019-05-16 RX ORDER — ALBUTEROL SULFATE 2.5 MG/3ML
2.5 SOLUTION RESPIRATORY (INHALATION) EVERY 6 HOURS PRN
Status: ON HOLD | COMMUNITY
End: 2019-05-21 | Stop reason: DRUGHIGH

## 2019-05-16 RX ADMIN — RISPERIDONE 0.25 MG: 0.25 TABLET ORAL at 22:46

## 2019-05-16 RX ADMIN — HEPARIN SODIUM 5000 UNITS: 5000 INJECTION INTRAVENOUS; SUBCUTANEOUS at 22:47

## 2019-05-16 RX ADMIN — SODIUM CHLORIDE 1000 ML: 9 INJECTION, SOLUTION INTRAVENOUS at 16:26

## 2019-05-16 RX ADMIN — CALCIUM GLUCONATE 1 G: 98 INJECTION, SOLUTION INTRAVENOUS at 18:10

## 2019-05-16 RX ADMIN — Medication 100 MEQ: at 17:32

## 2019-05-16 RX ADMIN — CEFTRIAXONE SODIUM 1 G: 1 INJECTION, POWDER, FOR SOLUTION INTRAMUSCULAR; INTRAVENOUS at 19:19

## 2019-05-16 ASSESSMENT — PAIN SCALES - WONG BAKER
WONGBAKER_NUMERICALRESPONSE: 2
WONGBAKER_NUMERICALRESPONSE: 0
WONGBAKER_NUMERICALRESPONSE: 2

## 2019-05-16 NOTE — ED TRIAGE NOTES
Pt arrived to ED, via squad, with initial c/o dehydration and altered mental status, per squad. Family reporting that she has dementia and does not answer questions and that she is at her \"baseline. \"  [facility] d/t her creat level of 6.8 and family reporting \"for dialysis. \"  RN inquired if pt has ever received dialysis before and family stated no. Pt is a DNR-CC, per the paperwork. Family reporting that the the pt \"hasn't eaten or drank anything for 7 days\" and that she \"isn't making much urine. \"  Family reporting that the pt does have C-diff in which she had a formed stool today. RN wearing proper PPE. Monitor applied and vitals taken. Pt has a 24g IV in place, placed at sending facility. Pt has a bag of 0.9 normal saline in place, this being her second bag of fluid. Pt opened eyes upon command.

## 2019-05-16 NOTE — ED PROVIDER NOTES
her mother is unknown. She indicated that the status of her father is unknown. She indicated that the status of her sister is unknown. She indicated that the status of her brother is unknown.   family history includes Hearing Loss in her brother, father, mother, and sister; High Blood Pressure in her mother; Kidney Disease in her sister; Stroke in her brother and father; Vision Loss in her mother. SOCIAL HISTORY      reports that she has never smoked. She has never used smokeless tobacco. She reports that she does not drink alcohol or use drugs. PHYSICAL EXAM     INITIAL VITALS:  height is 5' 1\" (1.549 m) and weight is 213 lb 9.6 oz (96.9 kg). Her oral temperature is 97.4 °F (36.3 °C). Her blood pressure is 102/55 (abnormal) and her pulse is 71. Her respiration is 18 and oxygen saturation is 92%. Physical Exam   Constitutional: She appears well-developed and well-nourished. Non-toxic appearance. Nasal cannula in place. Patient is fatigued and obese   HENT:   Head: Normocephalic and atraumatic. Right Ear: Tympanic membrane and external ear normal.   Left Ear: Tympanic membrane and external ear normal.   Nose: Nose normal.   Mouth/Throat: Oropharynx is clear and moist and mucous membranes are normal. No oropharyngeal exudate, posterior oropharyngeal edema or posterior oropharyngeal erythema. Eyes: Conjunctivae and EOM are normal.   Neck: Normal range of motion. Neck supple. No JVD present. Cardiovascular: Normal rate, regular rhythm, normal heart sounds, intact distal pulses and normal pulses. Exam reveals no gallop and no friction rub. No murmur heard. Pulmonary/Chest: Effort normal and breath sounds normal. No respiratory distress. She has no decreased breath sounds. She has no wheezes. She has no rhonchi. She has no rales. Abdominal: Soft. Bowel sounds are normal. She exhibits no distension. There is no tenderness. There is no rebound, no guarding and no CVA tenderness.    Musculoskeletal: Normal range of motion. She exhibits no edema. Neurological: She is alert. She exhibits normal muscle tone. Coordination normal.   Skin: Skin is warm and dry. No rash noted. She is not diaphoretic. Nursing note and vitals reviewed. DIFFERENTIALDIAGNOSIS:   Include and are not limited to: dehydration, Cdiff, KUMAR, electrolyte abnormality, infectious process    DIAGNOSTICRESULTS     EKG: All EKG's are interpreted by the Emergency Department Physician who either signs or Co-signs this chart in the absence of acardiologist.  EKG interpreted by Chayo Good DO:    None    RADIOLOGY: non-plain film images(s) such as CT, Ultrasound and MRI are read by the radiologist.    XR ABDOMEN FOR NG/OG/NE TUBE PLACEMENT   Final Result   1. Esophageal route tube looped in the esophagus. The tip is in the distal third of the esophagus. The entire tube should be removed. These findings were telephoned to [facility], the patient's nurse at 2:33 PM on 5/17/2019 . **This report has been created using voice recognition software. It may contain minor errors which are inherent in voice recognition technology. **      Final report electronically signed by Dr. Deandre Mares on 5/17/2019 2:34 PM      XR ABDOMEN FOR NG/OG/NE TUBE PLACEMENT   Final Result   NG tube is now in the distal third of the esophagus. Recommend advancement at least 6 to 8 cm. **This report has been created using voice recognition software. It may contain minor errors which are inherent in voice recognition technology. **      Final report electronically signed by Dr. Fercho Emerson on 5/17/2019 1:29 PM      XR ABDOMEN FOR NG/OG/NE TUBE PLACEMENT   Final Result   NG tube is terminates in the distal esophagus. Recommend advancement. **This report has been created using voice recognition software. It may contain minor errors which are inherent in voice recognition technology. **      Final report electronically signed by  Lorene Hurst on 5/17/2019 12:38 PM      US RENAL COMPLETE   Final Result      Suboptimal visualization of the left kidney. No hydronephrosis. Elevated renal resistive indices bilaterally. Possible debris layering in the urinary bladder. Patient unable to void. Bladder volume 366 cc. **This report has been created using voice recognition software. It may contain minor errors which are inherent in voice recognition technology. **       Final report electronically signed by Dr. Polina Bradley on 5/17/2019 6:16 AM      XR CHEST PORTABLE   Final Result      No acute pneumonia. Minimal mid right lung atelectasis. Mild cardiomegaly. **This report has been created using voice recognition software. It may contain minor errors which are inherent in voice recognition technology. **      Final report electronically signed by Dr. Polina Bradley on 5/17/2019 12:55 AM          LABS:   Labs Reviewed   URINE CULTURE, REFLEXED - Abnormal; Notable for the following components:       Result Value    Urine Culture Reflex   (*)     Value: Current antibiotic therapy ineffective in vitro for  isolate.       Organism Escherichia coli (*)     All other components within normal limits    Narrative:     Source: cath urine       Site:           Current Antibiotics: Metronidazole   BASIC METABOLIC PANEL W/ REFLEX TO MG FOR LOW K - Abnormal; Notable for the following components:    Sodium 147 (*)     Potassium reflex Magnesium 6.0 (*)     Chloride 117 (*)     CO2 15 (*)      (*)     CREATININE 6.4 (*)     Calcium 8.1 (*)     All other components within normal limits   CBC WITH AUTO DIFFERENTIAL - Abnormal; Notable for the following components:    WBC 12.4 (*)     RBC 3.70 (*)     Hemoglobin 10.2 (*)     Hematocrit 35.5 (*)     MCHC 28.7 (*)     RDW-CV 16.4 (*)     RDW-SD 58.0 (*)     Segs Absolute 9.5 (*)     Immature Grans (Abs) 0.13 (*)     All other components within normal limits   URINE WITH REFLEXED MICRO - Abnormal; Notable for the following components:    Bilirubin Urine SMALL (*)     Ketones, Urine TRACE (*)     Blood, Urine SMALL (*)     Protein, UA 30 (*)     Nitrite, Urine POSITIVE (*)     Leukocyte Esterase, Urine LARGE (*)     Color, UA DARK YELLOW (*)     Character, Urine TURBID (*)     All other components within normal limits   GLOMERULAR FILTRATION RATE, ESTIMATED - Abnormal; Notable for the following components:    Est, Glom Filt Rate 6 (*)     All other components within normal limits   OSMOLALITY - Abnormal; Notable for the following components:    Osmolality Calc 335.2 (*)     All other components within normal limits   RENAL FUNCTION PANEL - Abnormal; Notable for the following components:     (*)     CREATININE 6.9 (*)     Sodium 153 (*)     Potassium 5.7 (*)     Chloride 119 (*)     CO2 16 (*)     Phosphorus 8.2 (*)     Alb 2.0 (*)     All other components within normal limits   CBC WITH AUTO DIFFERENTIAL - Abnormal; Notable for the following components:    WBC 11.2 (*)     RBC 3.64 (*)     Hemoglobin 10.2 (*)     Hematocrit 34.6 (*)     MCHC 29.5 (*)     RDW-CV 16.6 (*)     RDW-SD 58.0 (*)     Segs Absolute 8.6 (*)     Immature Grans (Abs) 0.11 (*)     All other components within normal limits   PROTIME-INR - Abnormal; Notable for the following components:    INR 1.32 (*)     All other components within normal limits   MICROALBUMIN / CREATININE URINE RATIO - Abnormal; Notable for the following components:    Microalb/Creat Ratio 205 (*)     All other components within normal limits   ELECTROLYTE PANEL W/ REFLEX TO MG FOR LOW K - Abnormal; Notable for the following components:    Sodium 151 (*)     Potassium reflex Magnesium 5.6 (*)     Chloride 118 (*)     CO2 15 (*)     All other components within normal limits   PH VENOUS - Abnormal; Notable for the following components:    pH, Benoit 7.28 (*)     All other components within normal limits   ANION GAP - Abnormal; Notable for the following components:    Anion Gap 18.0 (*)     All other components within normal limits   GLOMERULAR FILTRATION RATE, ESTIMATED - Abnormal; Notable for the following components:    Est, Glom Filt Rate 6 (*)     All other components within normal limits   ELECTROLYTE PANEL W/ REFLEX TO MG FOR LOW K - Abnormal; Notable for the following components:    Sodium 150 (*)     Potassium reflex Magnesium 5.9 (*)     Chloride 118 (*)     CO2 16 (*)     All other components within normal limits   ANION GAP - Abnormal; Notable for the following components:    Anion Gap 18.0 (*)     All other components within normal limits   ELECTROLYTE PANEL W/ REFLEX TO MG FOR LOW K - Abnormal; Notable for the following components:    Sodium 150 (*)     Chloride 114 (*)     CO2 19 (*)     All other components within normal limits   BASIC METABOLIC PANEL - Abnormal; Notable for the following components:    Sodium 151 (*)     Potassium 5.5 (*)     Chloride 116 (*)     CO2 18 (*)     Glucose 174 (*)      (*)     CREATININE 6.8 (*)     Calcium 8.2 (*)     All other components within normal limits   BASIC METABOLIC PANEL - Abnormal; Notable for the following components:    Sodium 150 (*)     Chloride 114 (*)     CO2 21 (*)     Glucose 138 (*)      (*)     CREATININE 6.5 (*)     Calcium 8.0 (*)     All other components within normal limits   ANION GAP - Abnormal; Notable for the following components:    Anion Gap 17.0 (*)     All other components within normal limits   GLOMERULAR FILTRATION RATE, ESTIMATED - Abnormal; Notable for the following components:    Est, Glom Filt Rate 6 (*)     All other components within normal limits   MAGNESIUM - Abnormal; Notable for the following components:    Magnesium 2.5 (*)     All other components within normal limits   ANION GAP - Abnormal; Notable for the following components:    Anion Gap 17.0 (*)     All other components within normal limits   ELECTROLYTE PANEL W/ REFLEX TO MG FOR LOW K - Abnormal; of dehydration. Patient was fatigued and it was reported that the patient has not had any oral intake in the past 7 days. Na is 147, K is 6/0 and creatinine and BUN are elevated at 6.4 and 131. WBC is 12.4. Osmolarity is 335. UA reveals a large amount of leukocytosis and is positive for nitrates. The patient was treated with calcium gluconate, Rocephin, sodium bicarbonate, and IV fluids while in the ED. I observed the patient's condition to remain stable during the duration of their stay. Due to the patient's lab work and presenting consition I recommended admission and the patient's family was amenable to my decision. The case was discussed with Dr. Angela Mendoza (internal medicine) who graciously accepts the patient for admission and further evaluation. CRITICAL CARE:   None     CONSULTS:  Dr. Angela Mendoza (internal medicine) - graciously accepts the patient for admission and further evaluation/work-up. PROCEDURES:  None     FINAL IMPRESSION      1. Acute renal failure, unspecified acute renal failure type [facility])          DISPOSITION/PLAN   Admission    PATIENT REFERRED TO:  Pauline Qureshi MD  [address] [place] [id number]  [phone]          [address]  [phone]          DISCHARGE MEDICATIONS:  Current Discharge Medication List          (Please note that portions of this note were completed with a voice recognition program.  Efforts weremade to edit the dictations but occasionally words are mis-transcribed.)    Scribe:  Maryanne Lucero 5/16/19 4:45 PM Scribing for and in thepresence of Lala Martinez DO. Signed by: Alysha Molina 05/19/19 9:52 AM    Provider:  I personally performed the services described in the documentation, reviewed and edited the documentation which was dictated to the scribe in my presence, and it accurately records my words andactions.     Verbarry Nose, DO 5/16/19 9:52 AM       Verbarry Nose, DO  05/19/19 4354

## 2019-05-16 NOTE — ED NOTES
Pt resting on cot, with easy respirations. Monitor remains in place. Antibiotics started at thist [facility]aro.       [address] [place] [address], RN  05/16/19 4053

## 2019-05-16 NOTE — ED NOTES
Bed: 015A  Expected date: 5/16/19  Expected time: 3:51 PM  Means of arrival:   Comments:     Shirley Flores RN  05/16/19 1243

## 2019-05-16 NOTE — ED NOTES
RN obtained urine specimen via straight cath and placed an external russell. Dr. Jalen Banks verified DNR-CC status with family.       [address] [place] [address], MARIANELA  05/16/19 8320

## 2019-05-16 NOTE — ED NOTES
Pt resting on cot, with easy respirations. Monitor remains in place and external catheter remains in place. IV fluids infusing at this time.       [facility] MARIANELA Jamison  05/16/19 1847

## 2019-05-16 NOTE — ED NOTES
In to hang calcium for primary nurse, patient resting on cot with eyes closed and respirations easy. Family at bedside given directions to cafeteria. No further needs voiced.       Nadine Kraus RN  05/16/19 4523

## 2019-05-17 ENCOUNTER — APPOINTMENT (OUTPATIENT)
Dept: GENERAL RADIOLOGY | Age: 79
DRG: 682 | End: 2019-05-17
Payer: MEDICARE

## 2019-05-17 ENCOUNTER — APPOINTMENT (OUTPATIENT)
Dept: ULTRASOUND IMAGING | Age: 79
DRG: 682 | End: 2019-05-17
Payer: MEDICARE

## 2019-05-17 PROBLEM — E44.0 MODERATE MALNUTRITION (HCC): Status: ACTIVE | Noted: 2019-05-17

## 2019-05-17 LAB
ALBUMIN SERPL-MCNC: 2 G/DL (ref 3.5–5.1)
ANION GAP SERPL CALCULATED.3IONS-SCNC: 16 MEQ/L (ref 8–16)
ANION GAP SERPL CALCULATED.3IONS-SCNC: 17 MEQ/L (ref 8–16)
ANION GAP SERPL CALCULATED.3IONS-SCNC: 18 MEQ/L (ref 8–16)
ANION GAP SERPL CALCULATED.3IONS-SCNC: 18 MEQ/L (ref 8–16)
BASOPHILS # BLD: 0.4 %
BASOPHILS ABSOLUTE: 0 THOU/MM3 (ref 0–0.1)
BUN BLDV-MCNC: 129 MG/DL (ref 7–22)
BUN BLDV-MCNC: 132 MG/DL (ref 7–22)
CALCIUM SERPL-MCNC: 8.2 MG/DL (ref 8.5–10.5)
CALCIUM SERPL-MCNC: 8.5 MG/DL (ref 8.5–10.5)
CHLORIDE BLD-SCNC: 116 MEQ/L (ref 98–111)
CHLORIDE BLD-SCNC: 118 MEQ/L (ref 98–111)
CHLORIDE BLD-SCNC: 118 MEQ/L (ref 98–111)
CHLORIDE BLD-SCNC: 119 MEQ/L (ref 98–111)
CHLORIDE, URINE: 33 MEQ/L
CO2: 15 MEQ/L (ref 23–33)
CO2: 16 MEQ/L (ref 23–33)
CO2: 16 MEQ/L (ref 23–33)
CO2: 18 MEQ/L (ref 23–33)
CREAT SERPL-MCNC: 6.8 MG/DL (ref 0.4–1.2)
CREAT SERPL-MCNC: 6.9 MG/DL (ref 0.4–1.2)
CREATININE URINE: 98.3 MG/DL
CREATININE, URINE: 98.3 MG/DL
EOSINOPHIL # BLD: 1.2 %
EOSINOPHIL SMEAR: NORMAL
EOSINOPHILS ABSOLUTE: 0.1 THOU/MM3 (ref 0–0.4)
ERYTHROCYTE [DISTWIDTH] IN BLOOD BY AUTOMATED COUNT: 16.6 % (ref 11.5–14.5)
ERYTHROCYTE [DISTWIDTH] IN BLOOD BY AUTOMATED COUNT: 58 FL (ref 35–45)
GFR SERPL CREATININE-BSD FRML MDRD: 6 ML/MIN/1.73M2
GFR SERPL CREATININE-BSD FRML MDRD: 6 ML/MIN/1.73M2
GLUCOSE BLD-MCNC: 142 MG/DL (ref 70–108)
GLUCOSE BLD-MCNC: 174 MG/DL (ref 70–108)
GLUCOSE BLD-MCNC: 86 MG/DL (ref 70–108)
HCT VFR BLD CALC: 34.6 % (ref 37–47)
HEMOGLOBIN: 10.2 GM/DL (ref 12–16)
IMMATURE GRANS (ABS): 0.11 THOU/MM3 (ref 0–0.07)
IMMATURE GRANULOCYTES: 1 %
INR BLD: 1.32 (ref 0.85–1.13)
LYMPHOCYTES # BLD: 11.5 %
LYMPHOCYTES ABSOLUTE: 1.3 THOU/MM3 (ref 1–4.8)
MAGNESIUM: 2.5 MG/DL (ref 1.6–2.4)
MCH RBC QN AUTO: 28 PG (ref 26–33)
MCHC RBC AUTO-ENTMCNC: 29.5 GM/DL (ref 32.2–35.5)
MCV RBC AUTO: 95.1 FL (ref 81–99)
MICROALBUMIN UR-MCNC: 20.16 MG/DL
MICROALBUMIN/CREAT UR-RTO: 205 MG/G (ref 0–30)
MONOCYTES # BLD: 8.9 %
MONOCYTES ABSOLUTE: 1 THOU/MM3 (ref 0.4–1.3)
NUCLEATED RED BLOOD CELLS: 0 /100 WBC
OSMOLALITY URINE: 376 MOSMOL/KG (ref 250–750)
PH VENOUS: 7.28 (ref 7.31–7.41)
PHOSPHORUS: 8.2 MG/DL (ref 2.4–4.7)
PLATELET # BLD: 266 THOU/MM3 (ref 130–400)
PMV BLD AUTO: 10 FL (ref 9.4–12.4)
POTASSIUM REFLEX MAGNESIUM: 5.6 MEQ/L (ref 3.5–5.2)
POTASSIUM REFLEX MAGNESIUM: 5.9 MEQ/L (ref 3.5–5.2)
POTASSIUM SERPL-SCNC: 5.5 MEQ/L (ref 3.5–5.2)
POTASSIUM SERPL-SCNC: 5.7 MEQ/L (ref 3.5–5.2)
POTASSIUM, URINE: 45.9 MEQ/L
RBC # BLD: 3.64 MILL/MM3 (ref 4.2–5.4)
SEG NEUTROPHILS: 77 %
SEGMENTED NEUTROPHILS ABSOLUTE COUNT: 8.6 THOU/MM3 (ref 1.8–7.7)
SODIUM BLD-SCNC: 150 MEQ/L (ref 135–145)
SODIUM BLD-SCNC: 151 MEQ/L (ref 135–145)
SODIUM BLD-SCNC: 151 MEQ/L (ref 135–145)
SODIUM BLD-SCNC: 153 MEQ/L (ref 135–145)
SODIUM URINE: 42 MEQ/L
SODIUM URINE: 43 MEQ/L
SPECIMEN: NORMAL
TOTAL CK: 30 U/L (ref 30–135)
WBC # BLD: 11.2 THOU/MM3 (ref 4.8–10.8)

## 2019-05-17 PROCEDURE — 83935 ASSAY OF URINE OSMOLALITY: CPT

## 2019-05-17 PROCEDURE — 6370000000 HC RX 637 (ALT 250 FOR IP): Performed by: INTERNAL MEDICINE

## 2019-05-17 PROCEDURE — 84133 ASSAY OF URINE POTASSIUM: CPT

## 2019-05-17 PROCEDURE — 94761 N-INVAS EAR/PLS OXIMETRY MLT: CPT

## 2019-05-17 PROCEDURE — 82436 ASSAY OF URINE CHLORIDE: CPT

## 2019-05-17 PROCEDURE — 2580000003 HC RX 258: Performed by: INTERNAL MEDICINE

## 2019-05-17 PROCEDURE — 82948 REAGENT STRIP/BLOOD GLUCOSE: CPT

## 2019-05-17 PROCEDURE — 85025 COMPLETE CBC W/AUTO DIFF WBC: CPT

## 2019-05-17 PROCEDURE — 94640 AIRWAY INHALATION TREATMENT: CPT

## 2019-05-17 PROCEDURE — 82570 ASSAY OF URINE CREATININE: CPT

## 2019-05-17 PROCEDURE — 74018 RADEX ABDOMEN 1 VIEW: CPT

## 2019-05-17 PROCEDURE — 6360000002 HC RX W HCPCS: Performed by: INTERNAL MEDICINE

## 2019-05-17 PROCEDURE — 80051 ELECTROLYTE PANEL: CPT

## 2019-05-17 PROCEDURE — 2700000000 HC OXYGEN THERAPY PER DAY

## 2019-05-17 PROCEDURE — 82550 ASSAY OF CK (CPK): CPT

## 2019-05-17 PROCEDURE — 51798 US URINE CAPACITY MEASURE: CPT

## 2019-05-17 PROCEDURE — 94668 MNPJ CHEST WALL SBSQ: CPT

## 2019-05-17 PROCEDURE — 84300 ASSAY OF URINE SODIUM: CPT

## 2019-05-17 PROCEDURE — 51702 INSERT TEMP BLADDER CATH: CPT

## 2019-05-17 PROCEDURE — 94667 MNPJ CHEST WALL 1ST: CPT

## 2019-05-17 PROCEDURE — 76770 US EXAM ABDO BACK WALL COMP: CPT

## 2019-05-17 PROCEDURE — 1200000003 HC TELEMETRY R&B

## 2019-05-17 PROCEDURE — 99222 1ST HOSP IP/OBS MODERATE 55: CPT | Performed by: INTERNAL MEDICINE

## 2019-05-17 PROCEDURE — 94760 N-INVAS EAR/PLS OXIMETRY 1: CPT

## 2019-05-17 PROCEDURE — 82800 BLOOD PH: CPT

## 2019-05-17 PROCEDURE — 2500000003 HC RX 250 WO HCPCS: Performed by: INTERNAL MEDICINE

## 2019-05-17 PROCEDURE — 80069 RENAL FUNCTION PANEL: CPT

## 2019-05-17 PROCEDURE — 83735 ASSAY OF MAGNESIUM: CPT

## 2019-05-17 PROCEDURE — 36415 COLL VENOUS BLD VENIPUNCTURE: CPT

## 2019-05-17 PROCEDURE — 85610 PROTHROMBIN TIME: CPT

## 2019-05-17 PROCEDURE — 80048 BASIC METABOLIC PNL TOTAL CA: CPT

## 2019-05-17 PROCEDURE — 89190 NASAL SMEAR FOR EOSINOPHILS: CPT

## 2019-05-17 RX ORDER — SODIUM POLYSTYRENE SULFONATE 4.1 MEQ/G
15 POWDER, FOR SUSPENSION ORAL; RECTAL ONCE
Status: COMPLETED | OUTPATIENT
Start: 2019-05-17 | End: 2019-05-17

## 2019-05-17 RX ORDER — DEXTROSE MONOHYDRATE 25 G/50ML
25 INJECTION, SOLUTION INTRAVENOUS ONCE
Status: COMPLETED | OUTPATIENT
Start: 2019-05-17 | End: 2019-05-17

## 2019-05-17 RX ORDER — ALBUTEROL SULFATE 2.5 MG/3ML
10 SOLUTION RESPIRATORY (INHALATION) ONCE
Status: COMPLETED | OUTPATIENT
Start: 2019-05-17 | End: 2019-05-17

## 2019-05-17 RX ORDER — NICOTINE POLACRILEX 4 MG
15 LOZENGE BUCCAL PRN
Status: DISCONTINUED | OUTPATIENT
Start: 2019-05-17 | End: 2019-05-24 | Stop reason: HOSPADM

## 2019-05-17 RX ORDER — DEXTROSE MONOHYDRATE 25 G/50ML
12.5 INJECTION, SOLUTION INTRAVENOUS PRN
Status: DISCONTINUED | OUTPATIENT
Start: 2019-05-17 | End: 2019-05-24 | Stop reason: HOSPADM

## 2019-05-17 RX ORDER — SODIUM POLYSTYRENE SULFONATE 15 G/60ML
15 SUSPENSION ORAL; RECTAL ONCE
Status: DISCONTINUED | OUTPATIENT
Start: 2019-05-17 | End: 2019-05-17

## 2019-05-17 RX ORDER — DEXTROSE MONOHYDRATE 25 G/50ML
25 INJECTION, SOLUTION INTRAVENOUS ONCE
Status: DISCONTINUED | OUTPATIENT
Start: 2019-05-17 | End: 2019-05-24 | Stop reason: HOSPADM

## 2019-05-17 RX ORDER — ATENOLOL 25 MG/1
25 TABLET ORAL DAILY
Status: DISCONTINUED | OUTPATIENT
Start: 2019-05-17 | End: 2019-05-24 | Stop reason: HOSPADM

## 2019-05-17 RX ORDER — DEXTROSE MONOHYDRATE 50 MG/ML
100 INJECTION, SOLUTION INTRAVENOUS PRN
Status: DISCONTINUED | OUTPATIENT
Start: 2019-05-17 | End: 2019-05-24 | Stop reason: HOSPADM

## 2019-05-17 RX ADMIN — Medication: at 10:27

## 2019-05-17 RX ADMIN — HEPARIN SODIUM 5000 UNITS: 5000 INJECTION INTRAVENOUS; SUBCUTANEOUS at 14:48

## 2019-05-17 RX ADMIN — IPRATROPIUM BROMIDE AND ALBUTEROL SULFATE 1 AMPULE: .5; 3 SOLUTION RESPIRATORY (INHALATION) at 20:23

## 2019-05-17 RX ADMIN — SODIUM POLYSTYRENE SULFONATE 15 G: 1 POWDER ORAL; RECTAL at 08:52

## 2019-05-17 RX ADMIN — HEPARIN SODIUM 5000 UNITS: 5000 INJECTION INTRAVENOUS; SUBCUTANEOUS at 10:27

## 2019-05-17 RX ADMIN — DEXTROSE MONOHYDRATE 25 G: 25 INJECTION, SOLUTION INTRAVENOUS at 16:08

## 2019-05-17 RX ADMIN — ALBUTEROL SULFATE 10 MG: 2.5 SOLUTION RESPIRATORY (INHALATION) at 15:56

## 2019-05-17 RX ADMIN — Medication: at 20:55

## 2019-05-17 RX ADMIN — IPRATROPIUM BROMIDE AND ALBUTEROL SULFATE 1 AMPULE: .5; 3 SOLUTION RESPIRATORY (INHALATION) at 12:37

## 2019-05-17 RX ADMIN — HEPARIN SODIUM 5000 UNITS: 5000 INJECTION INTRAVENOUS; SUBCUTANEOUS at 21:08

## 2019-05-17 RX ADMIN — IPRATROPIUM BROMIDE AND ALBUTEROL SULFATE 1 AMPULE: .5; 3 SOLUTION RESPIRATORY (INHALATION) at 09:35

## 2019-05-17 RX ADMIN — Medication: at 09:05

## 2019-05-17 RX ADMIN — SODIUM BICARBONATE 50 MEQ: 84 INJECTION, SOLUTION INTRAVENOUS at 16:08

## 2019-05-17 RX ADMIN — INSULIN HUMAN 10 UNITS: 100 INJECTION, SOLUTION PARENTERAL at 16:08

## 2019-05-17 ASSESSMENT — PAIN SCALES - WONG BAKER
WONGBAKER_NUMERICALRESPONSE: 2
WONGBAKER_NUMERICALRESPONSE: 0
WONGBAKER_NUMERICALRESPONSE: 2

## 2019-05-17 NOTE — PROGRESS NOTES
Nutrition Assessment    Type and Reason for Visit: Initial    Nutrition Recommendations:   Diet initiation when/if appropriate, pending SLP evaluation. Plan ONS start when diet full liquids or greater. Recommend consider renal multivitamin, folbee plus, when able to take. Nutrition Assessment:   Pt. moderately malnourished AEB reports of poor oral intake less than 50% for 9 days, mild subcutaneous fat loss and mild muscle mass loss . At risk for further nutrition compromise r/t lethargy, NPO status, history of dementia, and need for nutrition support. Will plan to start ONS when diet intiated. Diet recommendations per SLP. Unable to place NGT due to [place] after 3 attempts per RN. Recommend consider renal multivitamin, folbee plus, when able to safely take. Malnutrition Assessment:  · Malnutrition Status: Meets the criteria for moderate malnutrition  · Context: Acute illness or injury  · Findings of the 6 clinical characteristics of malnutrition (Minimum of 2 out of 6 clinical characteristics is required to make the diagnosis of moderate or severe Protein Calorie Malnutrition based on AND/ASPEN Guidelines):  1. Energy Intake-Less than or equal to 50% of estimated energy requirement, Greater than or equal to 5 days (9 days)   2. Fat Loss-Mild subcutaneous fat loss, Orbital  3. Muscle Loss-Mild muscle mass loss, Temples (temporalis muscle)    Nutrition Risk Level: High    Nutrient Needs:  · Estimated Daily Total Kcal: ~ 1302-9350 kcals (15-18 kcals/kg/day based on 97 kg)  · Estimated Daily Protein (g): 38-48 grams (0.8-1 gram/kg/day based on 48 kg IBW) increase as renal status allows    Nutrition Diagnosis:   · Problem:  Moderate malnutrition, In context of acute illness or injury  · Etiology: related to Insufficient energy/nutrient consumption, Diarrhea, Cognitive or neurological impairment     Signs and symptoms:  as evidenced by Diet history of poor intake, Mild muscle loss, Mild loss of subcutaneous fat    Objective Information:  · Nutrition-Focused Physical Findings: Diagnosed with c-diff ~ 1 week ago. Increased lethargy, very poor oral intake food and drink per family. Pt with dementia, resides at [facility]. Sodium 151, K+ 5.6,  , Creatinine 6.9. NGT attempted x 3 for medication administration, unsuccessful due to [facility]. No bowel movement recorded since admit. SLP to see for dysphagia, too lethargic for evaluation today, plan re-attempt tommorrow if appropriate. Prior to 9 days ago on regular diet with thin liquid diet ate ~ 50% of meals (large meals) per daughter. On IVF: sodium chloride in D5 as 125 ml/hr, insulin, aricept. · Wound Type: None  · Current Nutrition Therapies:  · Oral Diet Orders: Renal(however staff keeping NPO due to lethargy, aspirated on water last night, SLP eval pending)   · Oral Diet intake: 0%  · Oral Nutrition Supplement (ONS) Orders: Renal Oral Supplement(When diet resumed plan to send Nepro TID)  · Anthropometric Measures:  · Ht: 5' 1\" (154.9 cm)   · Current Body Wt: 212 lb 14.4 oz (96.6 kg)(5/17/19 trace LE edema)  · Admission Body Wt: 212 lb 14.4 oz (96.6 kg)(5/17/19 trace LE edema)  · Usual Body Wt: (~ 219# per daughter. Per ECF records: 3/19/19: 211#, 3/28/19: 215.8#, 4/5/19: 219. 2#)  · % Weight Change:  ,  2.9% loss in 1 month  · Ideal Body Wt: 105 lb (47.6 kg),   · BMI Classification: BMI > or equal to 40.0 Obese Class III    Nutrition Interventions:   (When diet resumed plan ONS start.)  Continued Inpatient Monitoring, Education Initiated, Coordination of Care(Discussed nutrition ONS plan pending SLP evaluation with pt's daughter and her sister, pt lethargic/dementia hx.)    Nutrition Evaluation:   · Evaluation: Goals set   · Goals: Pt will receive adequate nutrition in 1-4 days.     · Monitoring: Nutrition Progression, Mental Status/Confusion, Pertinent Labs, Chewing/Swallowing, Diarrhea      Electronically signed by Leila Waller RD, LD on 5/17/19 at 2:55 PM    Contact Number: [phone]

## 2019-05-17 NOTE — PROGRESS NOTES
[address] [address]  SPEECH THERAPY MISSED TREATMENT NOTE  [facility] RENAL TELEMETRY 6K      Date: 2019  Patient Name: Heather Mackenzie        MRN: [id number]    : 1940  (77 y.o.)    REASON FOR MISSED TREATMENT:      Order for swallowing eval.  RN reports acute aspiration last evening with drop in oxygen sats.   Pt currently with minimal responsiveness with RN requesting hold on eval.  Deisy Cook check back on  to determine if pt is appropriate for eval.      Tito TERRELL/[id number]

## 2019-05-17 NOTE — PLAN OF CARE
Problem: Nutrition  Goal: Optimal nutrition therapy  Outcome: Ongoing   Nutrition Problem: Moderate malnutrition, In context of acute illness or injury  Intervention: Food and/or Nutrient Delivery: (When diet resumed plan ONS start.)  Nutritional Goals: Pt will receive adequate nutrition in 1-4 days.

## 2019-05-17 NOTE — CARE COORDINATION
19, 7:25 AM      Aroldo Jacobs       Admitted from: ED   2019/ 1600 [address] day: 1   Location: [address] [place] [address]-A Reason for admit: Acute renal failure (ARF) ([facility] [address] [N17.9] Status: Inpatient   Admit order signed?: yes  PMH:  has a past medical history of Aspiration pneumonia ([facility] [address]  bilateral  from large hiatus hernia, Dementia, Depression, GERD (gastroesophageal reflux disease), Hyperlipidemia, and Hypertension. Procedure: na  Pertinent abnormal Imagin/17/19  CXR    No acute pneumonia. Minimal mid right lung atelectasis. Mild cardiomegaly. 19  Renal US  Suboptimal visualization of the left kidney. No hydronephrosis. Elevated renal resistive indices bilaterally. Possible debris layering in the urinary bladder. Patient unable to void. Bladder volume 366 cc. Medications:  Scheduled Meds:   insulin regular  5 Units Intravenous Once    dextrose  25 g Intravenous Once    sodium polystyrene  15 g Rectal Once    atenolol  50 mg Oral Daily    donepezil  20 mg Oral Daily    risperiDONE  0.25 mg Oral BID    venlafaxine  75 mg Oral Daily with breakfast    sodium chloride flush  10 mL Intravenous 2 times per day    docusate sodium  100 mg Oral BID    heparin (porcine)  5,000 Units Subcutaneous 3 times per day    ipratropium-albuterol  1 ampule Inhalation Q4H WA     Continuous Infusions:   Pertinent Info/Orders/Treatment Plan:  Telemetry, accu checks as ordered, Heparin SQ, creat. 6.4 , duo nebs WBC 11.2, K+ 5.6 (was 6.0), Kaexalate, external urinary catheter, insert N/G tube for meds, flush N/G as ordered, neuro checks, contact isolation for c-diff, NPO, aspiration precautions, Bicarb drip      Diet: DIET RENAL;   Smoking status:  reports that she has never smoked.  She has never used smokeless tobacco.   PCP: Garo Hand MD  Readmission: no  Readmission Risk Score: 17%    Discharge Planning  Current Residence:  Nursing Home  Living Arrangements:  Other (Comment)(ECF)   Support Systems:  Spouse/Significant Other, Children, ECF/Assisted Living  Current Services PTA:     Potential Assistance Needed:  Extended Care Facility  Potential Assistance Purchasing Medications:  No  Does patient want to participate in local refill/ meds to beds program?  No  Type of Home Care Services:  None  Patient expects to be discharged to: The [address] [place] [address]  Expected Discharge date:  05/19/19  Follow Up Appointment: Best Day/ Time: Thursday AM    Discharge Plan: Radha Merida is from [address] [place] [address] assisting with discharge planning.       Evaluation: yes

## 2019-05-17 NOTE — PROGRESS NOTES
Pt admitted to  6K26 from ED. Complaints: acute renal failure. IV into the wrist left, condition patent and no redness. IV site free of s/s of infection or infiltration. Vital signs obtained. Assessment and data collection initiated. Two nurse skin assessment performed by Felicity Garcia RN and Demetra Rosas RN. Oriented to room. Policies and procedures for 6K explained. All questions answered with no further questions at this time. Fall prevention and safety brochure discussed with patient. Bed alarm on. Call light in reach. The best day to schedule a follow up Dr appointment is:  Thursday a.m.

## 2019-05-17 NOTE — PLAN OF CARE
Problem: Falls - Risk of:  Goal: Will remain free from falls  Description  Will remain free from falls  5/17/2019 1037 by Stefanie Baker RN  Outcome: Ongoing  Note:   No falls noted this shift. Continue falling star program. Bed alarm on, bed in low position. Call light and personal belongings in reach. Patient uses call light appropriately. Problem: Risk for Impaired Skin Integrity  Goal: Tissue integrity - skin and mucous membranes  Description  Structural intactness and normal physiological function of skin and  mucous membranes. 5/17/2019 1037 by Stefanie Baker RN  Outcome: Ongoing  Note:   Redness to buttocks and heels, turning and repositioning q2h and prn. No new signs or symptoms of skin breakdown noted this shift, encouraging patient to turn and reposition self in bed q2h       Problem: Discharge Planning:  Goal: Discharged to appropriate level of care  Description  Discharged to appropriate level of care  5/17/2019 1037 by Stefanie Baker RN  Outcome: Ongoing  Note:   Return to [facility] at d/c     Problem: Pain:  Goal: Pain level will decrease  Description  Pain level will decrease  5/17/2019 1037 by Stefanie Baker RN  Outcome: Ongoing  Note:   JOVITA     Problem: Diarrhea:  Goal: Bowel elimination is within specified parameters  Description  Bowel elimination is within specified parameters  Outcome: Ongoing  Note:   In isolation for Cdiff, [facility]      Problem:  Bowel/Gastric:  Goal: Occurrences of diarrhea will decrease  Description  Occurrences of diarrhea will decrease  Outcome: Ongoing  Note:   Has had one BM after given Kayexalate enema      Problem: Fluid Volume:  Goal: Will show no signs and symptoms of electrolyte imbalance  Description  Will show no signs and symptoms of electrolyte imbalance  Outcome: Ongoing  Note:   Started on NaBicarb gtt per nephro for ARF     Problem: Tissue Perfusion - Renal, Altered:  Goal: Electrolytes within specified

## 2019-05-17 NOTE — DISCHARGE INSTR - COC
with left leg weakness and urinary retention M48.061    Weakness of left lower extremity  spinal stenosis  or cva R29.898    Urinary retention  neurogenic bladder R33.9    Alzheimer's dementia with behavioral disturbance G30.9, F02.81    Aspiration pneumonia (HCC)  bilateral  from large hiatus hernia J69.0    Fever R50.9    Esophagitis K20.9    Dysmotility of stomach W90.77    Metabolic encephalopathy S86.41    Acute renal failure (ARF) (HCC) N17.9       Isolation/Infection:   Isolation          No Isolation            Nurse Assessment:  Last Vital Signs: BP (!) 110/50   Pulse 78   Temp 98.9 °F (37.2 °C) (Axillary)   Resp 22   Ht 5' 1\" (1.549 m)   Wt 212 lb 14.4 oz (96.6 kg)   SpO2 94%   BMI 40.23 kg/m²     Last documented pain score (0-10 scale):    Last Weight:   Wt Readings from Last 1 Encounters:   05/17/19 212 lb 14.4 oz (96.6 kg)     Mental Status:  disoriented    IV Access:  - None    Nursing Mobility/ADLs:  Walking   Dependent  Transfer  Dependent  Bathing  Dependent  Dressing  Dependent  Toileting  Dependent  Feeding  Dependent  Med Admin  Assisted  Med Delivery   crushed and prefers mixed with chocolate ice cream    Wound Care Documentation and Therapy:        Elimination:  Continence:   · Bowel: No  · Bladder: No  Urinary Catheter: None   Colostomy/Ileostomy/Ileal Conduit: No       Date of Last BM: 5/24/19    Intake/Output Summary (Last 24 hours) at 5/17/2019 1039  Last data filed at 5/17/2019 0502  Gross per 24 hour   Intake 1100 ml   Output 200 ml   Net 900 ml     I/O last 3 completed shifts:   In: 1100 [IV Piggyback:1100]  Out: 200 [Urine:200]    Safety Concerns:     Aspiration Risk    Impairments/Disabilities:      bedbound    Nutrition Therapy:  Current Nutrition Therapy:   - Oral Diet:  Dental Soft    Routes of Feeding: Oral  Liquids: No Restrictions  Daily Fluid Restriction: no  Last Modified Barium Swallow with Video (Video Swallowing Test): not done    Treatments at the Time of Hospital Discharge:   Respiratory Treatments: see MAR  Oxygen Therapy:  is on oxygen at 2 L/min per nasal cannula. Ventilator:    - No ventilator support    Rehab Therapies: none  Weight Bearing Status/Restrictions: No weight bearing restirctions  Other Medical Equipment (for information only, NOT a DME order):  wheelchair  Other Treatments: turn every two hours    Patient's personal belongings (please select all that are sent with patient):  None    RN SIGNATURE:  Electronically signed by Stefania Rudolph RN on 5/24/19 at 2:17 PM    CASE MANAGEMENT/SOCIAL WORK SECTION    Inpatient Status Date: 5/16/19    Readmission Risk Assessment Score:  Readmission Risk              Risk of Unplanned Readmission:        19           Discharging to Facility/ Agency   · Name: [address] [place] [address]  Address:    [address] [place] [address] Dr. Frank Ortiz, [address] [address]        Phone: [phone]       Fax: [phone]        ·   ·   ·     Dialysis Facility (if applicable)   · Name:  · Address:  · Dialysis Schedule:  · Phone:  · Fax:    / signature: Electronically signed by DAGOBERTO Velazco, ANAHY on 5/17/19 at 10:39 AM    PHYSICIAN SECTION    Prognosis: {Prognosis:6232514194}    Condition at Discharge: [address] Patient Condition:[id number]}    Rehab Potential (if transferring to Rehab): {Prognosis:7726128018}    Recommended Labs or Other Treatments After Discharge: ***    Physician Certification: I certify the above information and transfer of Jessy Monae  is necessary for the continuing treatment of the diagnosis listed and that she requires {Admit to Appropriate Level of Care:08921} for {GREATER/LESS:934486760} 30 days.      Update Admission H&P: No change in H&P    PHYSICIAN SIGNATURE:  Electronically signed by Sabine Roy MD on 5/24/19 at 1:48 PM

## 2019-05-17 NOTE — H&P
INTERNAL MEDICINE SPECIALTIES    History & Physical    Patient:  Kirk Glez  YOB: 1940  Date of Service: 5/17/2019  MRN: [id number]   Acct:  [de-identified]   Primary Care Physician: Alma Denton MD    Chief Complaint: Altered mentation    History of Present Illness:   History obtained from [facility] staff and chart review. The patient is a 77 y.o. female [facility] of altered mental status. Context: Pt is a pleasantly demented elderly female resident at [facility] who was sent in from NH on account of abnormal lab values. She had been on treatment with Metronidazole for C. difficile diarrhea for a week and had also been requiring fluid boluses per covering MD over the last couple of days due to decreased intake. Despite reports from the [facility] hospital about pt's mental status, family members think she is at baseline. Significant findings on admission include: Na 153, K 6.0, , Cr 6.4, Se OSm 335, WBC 12.4, venous pH 7.28     Past Medical History:        Diagnosis Date    Aspiration pneumonia (HCC)  bilateral  from large hiatus hernia 9/22/2017    Dementia     Depression     GERD (gastroesophageal reflux disease)     Hyperlipidemia     Hypertension        Past Surgical History:        Procedure Laterality Date    CHOLECYSTECTOMY      COLONOSCOPY      [place] ESOPHAGOGASTRODUODENOSCOPY TRANSORAL DIAGNOSTIC N/A 9/30/2017    EGD ESOPHAGOGASTRODUODENOSCOPY performed by Tyra Zarco MD at [address] Medications:   No current facility-administered medications on file prior to encounter.       Current Outpatient Medications on File Prior to Encounter   Medication Sig Dispense Refill    acetaminophen (TYLENOL) 325 MG tablet Take 650 mg by mouth every 6 hours as needed for Pain      albuterol (PROVENTIL) (2.5 MG/3ML) 0.083% nebulizer solution Take 2.5 mg by nebulization every 6 hours as needed for Wheezing      LORazepam (ATIVAN) 0.5 MG tablet Take 0.5 mg by mouth every 6 hours as needed for Anxiety.  divalproex (DEPAKOTE SPRINKLE) 125 MG capsule Take 125 mg by mouth 2 times daily      metroNIDAZOLE (FLAGYL) 500 MG tablet Take 500 mg by mouth 3 times daily      hydrALAZINE (APRESOLINE) 10 MG tablet Take 10 mg by mouth 3 times daily      losartan-hydrochlorothiazide (HYZAAR) 100-12.5 MG per tablet Take 1 tablet by mouth daily      omeprazole (PRILOSEC) 40 MG delayed release capsule Take 40 mg by mouth daily      ranitidine (ZANTAC) 15 MG/ML syrup Take by mouth 2 times daily      furosemide (LASIX) 20 MG tablet Take 20 mg by mouth 2 times daily      potassium chloride (KLOR-CON) 20 MEQ packet Take 20 mEq by mouth 2 times daily      venlafaxine (EFFEXOR XR) 75 MG extended release capsule Take 1 capsule by mouth daily (with breakfast) 30 capsule 0    risperiDONE (RISPERDAL) 0.25 MG tablet Take 1 tablet by mouth 2 times daily 60 tablet 0    atenolol (TENORMIN) 50 MG tablet Take 1 tablet by mouth daily Hold for systolic blood pressure less than 110, heart rate less than 60 30 tablet 0    ferrous gluconate (FERGON) 240 (27 Fe) MG tablet Take 1.5 tablets by mouth 3 times daily (with meals)      sucralfate (CARAFATE) 1 GM/10ML suspension Take 10 mLs by mouth 4 times daily (before meals and nightly) 1200 mL 1    atorvastatin (LIPITOR) 40 MG tablet Take 40 mg by mouth daily      donepezil (ARICEPT) 10 MG tablet Take 20 mg by mouth daily       ALPRAZolam (XANAX) 0.5 MG tablet Take 1 tablet by mouth every 8 hours as needed for Anxiety 6 tablet 0    pantoprazole (PROTONIX) 40 MG tablet Take 1 tablet by mouth 2 times daily (before meals) 60 tablet 0    loperamide (IMODIUM) 2 MG capsule Take 2 mg by mouth nightly. Allergies:  Patient has no known allergies. Social History:    reports that she has never smoked. She has never used smokeless tobacco. She reports that she does not drink alcohol or use drugs.     Family History:       Problem Relation Age of Onset    Hearing Loss Mother     High Blood Pressure Mother    Richie Guanamp Vision Loss Mother     Hearing Loss Father     Stroke Father     Hearing Loss Sister     Kidney Disease Sister     Hearing Loss Brother     Stroke Brother        Review of systems:  Pertinent positives and negatives as contained in HPI. All other systems reviewed with no clinically significant findings. 10 point review of systems completed, all other than noted above are negative. Vitals:   Vitals:    05/17/19 0820   BP: (!) 110/50   Pulse: 78   Resp: 22   Temp: 98.9 °F (37.2 °C)   SpO2: 97%      BMI: Body mass index is 40.23 kg/m².                 Exam:  Physical Examination:   General appearance: appears stated age, morbidly obese and lethargic elderly female  Neck: no carotid bruit, no JVD and supple, symmetrical, trachea midline  Lungs: clear to auscultation bilaterally  Heart: regular rate and rhythm, S1, S2 normal, no murmur, click, rub or gallop  Abdomen: soft, non-tender; bowel sounds normal; no masses,  no organomegaly  Extremities: extremities normal, atraumatic, no cyanosis or edema  Pulses: 2+ and symmetric  Skin: Skin color, texture, turgor normal. No rashes or lesions  Neurologic: Mental status: alertness: lethargic    Review of Labs and Diagnostic Testing:    Recent Results (from the past 24 hour(s))   Urine with Reflexed Micro    Collection Time: 05/16/19  4:30 PM   Result Value Ref Range    Glucose, Ur NEGATIVE NEGATIVE mg/dl    Bilirubin Urine SMALL (A) NEGATIVE    Ketones, Urine TRACE (A) NEGATIVE    Specific Gravity, Urine 1.017 1.002 - 1.03    Blood, Urine SMALL (A) NEGATIVE    pH, UA 5.0 5.0 - 9.0    Protein, UA 30 (A) NEGATIVE    Urobilinogen, Urine 0.2 0.0 - 1.0 eu/dl    Nitrite, Urine POSITIVE (A) NEGATIVE    Leukocyte Esterase, Urine LARGE (A) NEGATIVE    Color, UA DARK YELLOW (A) STRAW-YELL    Character, Urine TURBID (A) CLEAR-SL C    RBC, UA 25-50 0-2/hpf /hpf    WBC, UA > 200 0-4/hpf /hpf    Epi Cells 0-2 3-5/hpf /hpf    Amorphous, UA DEBRIS NONE SEEN    Mucus, UA NONE SEEN NONE SEEN/    Bacteria, UA NONE FEW/NONE S /hpf    Casts UA 8-15 HYALINE NONE SEEN /lpf    Crystals NONE SEEN NONE SEEN    Renal Epithelial, Urine NONE SEEN NONE SEEN    Yeast, UA NONE SEEN NONE SEEN    CASTS 2 NONE SEEN NONE SEEN /lpf    MISCELLANEOUS 2 NONE SEEN    Bile Acids, Total    Collection Time: 05/16/19  4:30 PM   Result Value Ref Range    Ictotest NEGATIVE NEGATIVE   Urine Culture, Reflexed    Collection Time: 05/16/19  4:45 PM   Result Value Ref Range    Organism enteric gram negative bacilli (A)     Urine Culture Reflex [place] count: >100,000 CFU/mL    Basic Metabolic Panel w/ Reflex to MG    Collection Time: 05/16/19  5:03 PM   Result Value Ref Range    Sodium 147 (H) 135 - 145 meq/L    Potassium reflex Magnesium 6.0 (HH) 3.5 - 5.2 meq/L    Chloride 117 (H) 98 - 111 meq/L    CO2 15 (L) 23 - 33 meq/L    Glucose 108 70 - 108 mg/dL     (H) 7 - 22 mg/dL    CREATININE 6.4 (HH) 0.4 - 1.2 mg/dL    Calcium 8.1 (L) 8.5 - 10.5 mg/dL   CBC Auto Differential    Collection Time: 05/16/19  5:03 PM   Result Value Ref Range    WBC 12.4 (H) 4.8 - 10.8 thou/mm3    RBC 3.70 (L) 4.20 - 5.40 mill/mm3    Hemoglobin 10.2 (L) 12.0 - 16.0 gm/dl    Hematocrit 35.5 (L) 37.0 - 47.0 %    MCV 95.9 81.0 - 99.0 fL    MCH 27.6 26.0 - 33.0 pg    MCHC 28.7 (L) 32.2 - 35.5 gm/dl    RDW-CV 16.4 (H) 11.5 - 14.5 %    RDW-SD 58.0 (H) 35.0 - 45.0 fL    Platelets 409 499 - 954 thou/mm3    MPV 9.9 9.4 - 12.4 fL    Seg Neutrophils 76.7 %    Lymphocytes 11.5 %    Monocytes 9.4 %    Eosinophils 1.0 %    Basophils 0.3 %    Immature Granulocytes 1.1 %    Platelet Estimate ADEQUATE Adequate    Segs Absolute 9.5 (H) 1.8 - 7.7 thou/mm3    Lymphocytes # 1.4 1.0 - 4.8 thou/mm3    Monocytes # 1.2 0.4 - 1.3 thou/mm3    Eosinophils # 0.1 0.0 - 0.4 thou/mm3    Basophils # 0.0 0.0 - 0.1 thou/mm3    Immature Grans (Abs) 0.13 (H) 0.00 - 0.07 thou/mm3    nRBC 0 /100 wbc    Hypochromia PRESENT Absent   Anion Gap    Collection Time: 05/16/19  5:03 PM   Result Value Ref Range    Anion Gap 15.0 8.0 - 16.0 meq/L   Glomerular Filtration Rate, Estimated    Collection Time: 05/16/19  5:03 PM   Result Value Ref Range    Est, Glom Filt Rate 6 (A) ml/min/1.73m2   Osmolality    Collection Time: 05/16/19  5:03 PM   Result Value Ref Range    Osmolality Calc 335.2 (H) 275.0 - 300 mOsmol/kg   Scan of Blood Smear    Collection Time: 05/16/19  5:03 PM   Result Value Ref Range    SCAN OF BLOOD SMEAR see below    Renal Function Panel    Collection Time: 05/17/19  6:31 AM   Result Value Ref Range    Glucose 86 70 - 108 mg/dL     (H) 7 - 22 mg/dL    CREATININE 6.9 (HH) 0.4 - 1.2 mg/dL    Sodium 153 (H) 135 - 145 meq/L    Potassium 5.7 (H) 3.5 - 5.2 meq/L    Chloride 119 (H) 98 - 111 meq/L    CO2 16 (L) 23 - 33 meq/L    Calcium 8.5 8.5 - 10.5 mg/dL    Phosphorus 8.2 (H) 2.4 - 4.7 mg/dL    Alb 2.0 (L) 3.5 - 5.1 g/dL   CK    Collection Time: 05/17/19  6:31 AM   Result Value Ref Range    Total CK 30 30 - 135 U/L   CBC auto differential    Collection Time: 05/17/19  6:31 AM   Result Value Ref Range    WBC 11.2 (H) 4.8 - 10.8 thou/mm3    RBC 3.64 (L) 4.20 - 5.40 mill/mm3    Hemoglobin 10.2 (L) 12.0 - 16.0 gm/dl    Hematocrit 34.6 (L) 37.0 - 47.0 %    MCV 95.1 81.0 - 99.0 fL    MCH 28.0 26.0 - 33.0 pg    MCHC 29.5 (L) 32.2 - 35.5 gm/dl    RDW-CV 16.6 (H) 11.5 - 14.5 %    RDW-SD 58.0 (H) 35.0 - 45.0 fL    Platelets 291 731 - 523 thou/mm3    MPV 10.0 9.4 - 12.4 fL    Seg Neutrophils 77.0 %    Lymphocytes 11.5 %    Monocytes 8.9 %    Eosinophils 1.2 %    Basophils 0.4 %    Immature Granulocytes 1.0 %    Segs Absolute 8.6 (H) 1.8 - 7.7 thou/mm3    Lymphocytes # 1.3 1.0 - 4.8 thou/mm3    Monocytes # 1.0 0.4 - 1.3 thou/mm3    Eosinophils # 0.1 0.0 - 0.4 thou/mm3    Basophils # 0.0 0.0 - 0.1 thou/mm3    Immature Grans (Abs) 0.11 (H) 0.00 - 0.07 thou/mm3    nRBC 0 /100 wbc Protime-INR    Collection Time: 05/17/19  6:31 AM   Result Value Ref Range    INR 1.32 (H) 0.85 - 1.13   Anion Gap    Collection Time: 05/17/19  6:31 AM   Result Value Ref Range    Anion Gap 18.0 (H) 8.0 - 16.0 meq/L   Glomerular Filtration Rate, Estimated    Collection Time: 05/17/19  6:31 AM   Result Value Ref Range    Est, Glom Filt Rate 6 (A) ml/min/1.73m2   Ph Venous    Collection Time: 05/17/19  7:38 AM   Result Value Ref Range    pH, Benoit 7.28 (L) 7.31 - 7.41       Radiology:     Us Renal Complete    Result Date: 5/17/2019  PROCEDURE: US RENAL COMPLETE CLINICAL INFORMATION: RENAL FAILURE, ACUTE (KIDNEY INJURY). COMPARISON: Abdomen pelvis CT with contrast dated 9/22/2017 TECHNIQUE:Real-time and color flow ultrasound of the kidneys and bladder were performed. FINDINGS: Kidneys: Left kidney was very poorly visualized. Morphology: Normal echogenicity. Mass/cyst: none visualized Hydronephrosis: There is no right hydronephrosis. There is no definite left hydronephrosis. Calculi: none visualized RIGHT KIDNEY - 10.5 x 4.9 x 4.7 cm Resistive Index - 0.80 Cortical Thickness - 1.2 cm LEFT KIDNEY - 10.1 x 4.2 x 3.7 cm Resistive Index - 0.80 Cortical Thickness - 1.2 cm URINARY BLADDER: No bladder wall thickening or obvious mass. Bilateral urine bladder jets were visualized. There are low level internal echoes in the dependent portion of the bladder which may represent debris Pre-Void - 366 mL Post-Void - pt unable to void     Suboptimal visualization of the left kidney. No hydronephrosis. Elevated renal resistive indices bilaterally. Possible debris layering in the urinary bladder. Patient unable to void. Bladder volume 366 cc. **This report has been created using voice recognition software. It may contain minor errors which are inherent in voice recognition technology. ** Final report electronically signed by Dr. Braden Ordoñez on 5/17/2019 6:16 AM    Xr Chest Portable    Result Date: 5/17/2019  PROCEDURE: XR CHEST PORTABLE CLINICAL INFORMATION: Aspiration. COMPARISON: Chest x-ray dated 9/25/2017 TECHNIQUE: AP Portable chest xray FINDINGS: Lungs/pleura:  No pneumonia, pulmonary edema, or obvious mass. There is minimal mid right lung atelectasis. No pleural effusion. No pneumothorax. Heart: There is mild cardiomegaly. Mediastinum/gely: No obvious mass or adenopathy. Skeleton: No significant bone or joint abnormality. Lines/tubes/devices: none     No acute pneumonia. Minimal mid right lung atelectasis. Mild cardiomegaly. **This report has been created using voice recognition software. It may contain minor errors which are inherent in voice recognition technology. ** Final report electronically signed by Dr. Pepper Vergara on 5/17/2019 12:55 AM        EKG: normal sinus rhythm, no blocks or conduction defects, no ischemic changes    Impression:  Principal Problem:    Acute renal failure (ARF) ([address]  Resolved Problems:    * No resolved hospital problems. *          Assessment & Plan:    1. Admit for further evaluation and management  2. Nephrology consult  3. Correct K; IV rehydration and monitor renal profile  4. Renal US and urine lytes  5. Hold diuretics and ACEi  6. NGT for feeding and medication  7.  Further treatment will be guided by evolution of clinical course          DVT prophylaxis: [] Lovenox                                 [] SCDs                                 [x] SQ Heparin                                 [] Encourage ambulation, low risk for DVT, no chemical or mechanical prophylaxis necessary              [] Already on Anticoagulation                Anticipated Disposition upon discharge: [] Home                                                                         [] Home with Home Health                                                                         [x] [facility] Assisted Living facility                                                                         [] [address] [place] [address] [facility]          Electronically signed by Gerry Glass MD on 5/17/2019 at 9:41 AM

## 2019-05-17 NOTE — CONSULTS
[facility]    [address]  [facility]  5/17/2019 8:07 AM    Pt Name:    Seda Shaw  MRN:     [id number]   [id number]  YOB: 1940  Admit Date:    5/16/2019  3:55 PM  Primary Care Physician:  Tamra Khan MD        Reason for Consult:  Acute kidney injury    History:   The patient is a 77 y.o. female with history of dementia, HTN, GERD, aspiration, hyperlipidemia presented from [address] [place] [address] with KUMAR. Labs drawn at [facility] showed a creatinine of 6.8, K of 6.1, and bicarb of 13. Last labs to review from 2017 showed a creatinine of 0.9. She has never seen a nephrologist before.  at bedside and provided history. He states she has not had anything to eat or drink in 7 days. She was recently diagnosed with C. Diff last Friday and has been receiving Flagyl. He states she had been having diarrhea for 3-4 weeks too many stools to count. Over the last week she hasn't eaten so they decided to do bloodwork which showed the above results. In the ED patient received 1 Liter IV fluid bolus. She was found to have gram negative UTI and urinary retention on renal US. She has external russell cath, bladder scan showing ~300 cc in urine. Blood pressures stable. At home was on Lasix, Klor con, Prilosec, and Hyzaar. Patient has also been coughing with sips of water. She was hypoxic overnight after this and had to be placed on 5 L NC. This is improving.     Past Medical History:  Past Medical History:   Diagnosis Date    Aspiration pneumonia (HCC)  bilateral  from large hiatus hernia 9/22/2017    Dementia     Depression     GERD (gastroesophageal reflux disease)     Hyperlipidemia     Hypertension        Past Surgical History:  Past Surgical History:   Procedure Laterality Date    CHOLECYSTECTOMY      COLONOSCOPY      [place] ESOPHAGOGASTRODUODENOSCOPY TRANSORAL DIAGNOSTIC N/A 9/30/2017    EGD ESOPHAGOGASTRODUODENOSCOPY Yes Historical Provider, MD   donepezil (ARICEPT) 10 MG tablet Take 20 mg by mouth daily    Yes Historical Provider, MD   ALPRAZolam Radha Bard) 0.5 MG tablet Take 1 tablet by mouth every 8 hours as needed for Anxiety 10/4/17   Monalisa Bryan PA-C   pantoprazole (PROTONIX) 40 MG tablet Take 1 tablet by mouth 2 times daily (before meals) 10/4/17   Monalisa Bryan PA-C   loperamide (IMODIUM) 2 MG capsule Take 2 mg by mouth nightly. Historical Provider, MD       Review of Systems:  Unable to obtain    Current Meds:  Infusion:    sodium bicarbonate infusion       Meds:    insulin regular  5 Units Intravenous Once    dextrose  25 g Intravenous Once    sodium polystyrene  15 g Rectal Once    atenolol  25 mg Oral Daily    donepezil  20 mg Oral Daily    risperiDONE  0.25 mg Oral BID    venlafaxine  75 mg Oral Daily with breakfast    sodium chloride flush  10 mL Intravenous 2 times per day    docusate sodium  100 mg Oral BID    heparin (porcine)  5,000 Units Subcutaneous 3 times per day    ipratropium-albuterol  1 ampule Inhalation Q4H WA     Meds prn: sodium chloride flush, ondansetron, acetaminophen     Allergies/Intolerances: ALLERGIES: Patient has no known allergies. 24HR INTAKE/OUTPUT:      Intake/Output Summary (Last 24 hours) at 5/17/2019 0807  Last data filed at 5/17/2019 0502  Gross per 24 hour   Intake 1100 ml   Output 200 ml   Net 900 ml     I/O last 3 completed shifts: In: 1100 [IV Piggyback:1100]  Out: 200 [Urine:200]  No intake/output data recorded. Admission weight: 207 lb (93.9 kg)  Wt Readings from Last 3 Encounters:   05/17/19 212 lb 14.4 oz (96.6 kg)   10/04/17 207 lb 11.2 oz (94.2 kg)   08/25/16 189 lb (85.7 kg)     Body mass index is 40.23 kg/m².     Physical Examination:  VITALS:  BP (!) 100/52   Pulse 79   Temp 98.8 °F (37.1 °C) (Axillary)   Resp 24   Ht 5' 1\" (1.549 m)   Wt 212 lb 14.4 oz (96.6 kg)   SpO2 97%   BMI 40.23 kg/m²   Weight:   Wt Readings from Last 3 catheter  F/u urine electrolytes  Patient is DNR- [facility] but  at bedside says they would want RRT if indicated. Will monitor closely. Thank you for allowing me to participate in the care of this patient. Please feel free to call me if you have any questions.      Electronically signed by Nitesh Kohli DO on 5/17/19 at 8:07 AM    [facility]

## 2019-05-17 NOTE — CARE COORDINATION
5/17/19, 10:38 AM    DISCHARGE BARRIERS    SW consult received Patient is from [facility]. GLENN spoke with [facility] and she is in agreement with Patient returning to [address] [place] [address] but has spoke with administration regarding some concerns. GLENN spoke with [facility] and Patient is a medicaid bedhold at this time. Melody indicated that Patient does have managed medicare if skilled days are needed at discharge. Full SW consult deferred.

## 2019-05-17 NOTE — PROGRESS NOTES
Update:  I spoke extensively with patient's daughter, Luca Valdez, and her  as well as sister. They are aware kidney function is not improving and she remains hyperkalemic and is at risk for sudden cardiac death. They are not sure if they want to proceed with dialysis at this point but they do not want to make a decision tonight. They will discuss amongst themselves and make a decision tomorrow morning. She is DNR.

## 2019-05-17 NOTE — PLAN OF CARE
Problem: DISCHARGE BARRIERS  Goal: Patient's continuum of care needs are met  Outcome: Ongoing  Note:   Patient return to [facility]. See  notes 5/17/19.

## 2019-05-17 NOTE — PLAN OF CARE
Problem: Impaired respiratory status  Goal: Clear lung sounds  Outcome: Ongoing  Note:   Pt started on aerosols with Duoneb and PD&P to clear lung sounds/improve aeration and to help clear secretions and atelectasis.

## 2019-05-18 LAB
ANION GAP SERPL CALCULATED.3IONS-SCNC: 14 MEQ/L (ref 8–16)
ANION GAP SERPL CALCULATED.3IONS-SCNC: 15 MEQ/L (ref 8–16)
ANION GAP SERPL CALCULATED.3IONS-SCNC: 17 MEQ/L (ref 8–16)
BUN BLDV-MCNC: 130 MG/DL (ref 7–22)
CALCIUM SERPL-MCNC: 8 MG/DL (ref 8.5–10.5)
CHLORIDE BLD-SCNC: 110 MEQ/L (ref 98–111)
CHLORIDE BLD-SCNC: 114 MEQ/L (ref 98–111)
CHLORIDE BLD-SCNC: 114 MEQ/L (ref 98–111)
CO2: 19 MEQ/L (ref 23–33)
CO2: 21 MEQ/L (ref 23–33)
CO2: 24 MEQ/L (ref 23–33)
CREAT SERPL-MCNC: 6.5 MG/DL (ref 0.4–1.2)
GFR SERPL CREATININE-BSD FRML MDRD: 6 ML/MIN/1.73M2
GLUCOSE BLD-MCNC: 138 MG/DL (ref 70–108)
ORGANISM: ABNORMAL
POTASSIUM REFLEX MAGNESIUM: 4.3 MEQ/L (ref 3.5–5.2)
POTASSIUM REFLEX MAGNESIUM: 4.8 MEQ/L (ref 3.5–5.2)
POTASSIUM SERPL-SCNC: 4.5 MEQ/L (ref 3.5–5.2)
SODIUM BLD-SCNC: 148 MEQ/L (ref 135–145)
SODIUM BLD-SCNC: 150 MEQ/L (ref 135–145)
SODIUM BLD-SCNC: 150 MEQ/L (ref 135–145)
URINE CULTURE REFLEX: ABNORMAL
URINE CULTURE REFLEX: ABNORMAL

## 2019-05-18 PROCEDURE — 1200000003 HC TELEMETRY R&B

## 2019-05-18 PROCEDURE — 2500000003 HC RX 250 WO HCPCS: Performed by: INTERNAL MEDICINE

## 2019-05-18 PROCEDURE — 94761 N-INVAS EAR/PLS OXIMETRY MLT: CPT

## 2019-05-18 PROCEDURE — 80051 ELECTROLYTE PANEL: CPT

## 2019-05-18 PROCEDURE — 6370000000 HC RX 637 (ALT 250 FOR IP): Performed by: INTERNAL MEDICINE

## 2019-05-18 PROCEDURE — 2700000000 HC OXYGEN THERAPY PER DAY

## 2019-05-18 PROCEDURE — 2580000003 HC RX 258: Performed by: INTERNAL MEDICINE

## 2019-05-18 PROCEDURE — 99232 SBSQ HOSP IP/OBS MODERATE 35: CPT | Performed by: INTERNAL MEDICINE

## 2019-05-18 PROCEDURE — 94640 AIRWAY INHALATION TREATMENT: CPT

## 2019-05-18 PROCEDURE — 80048 BASIC METABOLIC PNL TOTAL CA: CPT

## 2019-05-18 PROCEDURE — 36415 COLL VENOUS BLD VENIPUNCTURE: CPT

## 2019-05-18 PROCEDURE — 6360000002 HC RX W HCPCS: Performed by: INTERNAL MEDICINE

## 2019-05-18 RX ADMIN — Medication: at 05:54

## 2019-05-18 RX ADMIN — IPRATROPIUM BROMIDE AND ALBUTEROL SULFATE 1 AMPULE: .5; 3 SOLUTION RESPIRATORY (INHALATION) at 17:53

## 2019-05-18 RX ADMIN — IPRATROPIUM BROMIDE AND ALBUTEROL SULFATE 1 AMPULE: .5; 3 SOLUTION RESPIRATORY (INHALATION) at 10:11

## 2019-05-18 RX ADMIN — HEPARIN SODIUM 5000 UNITS: 5000 INJECTION INTRAVENOUS; SUBCUTANEOUS at 14:00

## 2019-05-18 RX ADMIN — HEPARIN SODIUM 5000 UNITS: 5000 INJECTION INTRAVENOUS; SUBCUTANEOUS at 21:19

## 2019-05-18 RX ADMIN — METRONIDAZOLE 500 MG: 500 INJECTION, SOLUTION INTRAVENOUS at 21:19

## 2019-05-18 RX ADMIN — HEPARIN SODIUM 5000 UNITS: 5000 INJECTION INTRAVENOUS; SUBCUTANEOUS at 05:54

## 2019-05-18 RX ADMIN — METRONIDAZOLE 500 MG: 500 INJECTION, SOLUTION INTRAVENOUS at 14:24

## 2019-05-18 ASSESSMENT — PAIN SCALES - WONG BAKER
WONGBAKER_NUMERICALRESPONSE: 2

## 2019-05-18 ASSESSMENT — PAIN SCALES - GENERAL
PAINLEVEL_OUTOF10: 0

## 2019-05-18 NOTE — PLAN OF CARE
Keep lungs clear of any wheezes throughout  Problem: Impaired respiratory status  Goal: Clear lung sounds  5/[id number]/[id number] by Pam Romero RCP  Outcome: Ongoing  5/17/2019 0941 by Sundar Cabrera RCP  Outcome: Ongoing  Note:   Pt started on aerosols with Duoneb and PD&P to clear lung sounds/improve aeration and to help clear secretions and atelectasis.

## 2019-05-18 NOTE — PLAN OF CARE
Problem: Falls - Risk of:  Goal: Will remain free from falls  Description  Will remain free from falls  Outcome: Ongoing  Note:   No falls this shift. Falling star protocol in place. Call light in reach. Bed in lowest position. Side rails up x2. Nonskid footwear on. Bed alarm set. Patient visually checked on hourly rounds. Goal: Absence of physical injury  Description  Absence of physical injury  Outcome: Ongoing     Problem: Risk for Impaired Skin Integrity  Goal: Tissue integrity - skin and mucous membranes  Description  Structural intactness and normal physiological function of skin and  mucous membranes. Outcome: Ongoing  Note:   No new skin breakdown noted this shift. Pt repositioned q2h and PRN. Heels elevated off of bed with pillows. Moisture barrier cream applied to buttocks. Problem: Discharge Planning:  Goal: Discharged to appropriate level of care  Description  Discharged to appropriate level of care  Outcome: Ongoing  Note:   Pt from [address] [place] [address]. Discharge planning continues. Problem: Pain:  Goal: Pain level will decrease  Description  Pain level will decrease  Outcome: Ongoing  Note:   Pt unable to verbalize pain. No facial grimacing noted. Problem: Impaired respiratory status  Goal: Clear lung sounds  8/83/3120 [id number] by Syl Marcelo RCP  Outcome: Ongoing     Problem: Diarrhea:  Goal: Bowel elimination is within specified parameters  Description  Bowel elimination is within specified parameters  Outcome: Ongoing  Note:   Bowel sounds active. Pt has had 2 watery BM this shift. Goal: Passage of soft, formed stool  Description  Passage of soft, formed stool  Outcome: Ongoing  Note:   Pt continues to have watery BM. Goal: Establishment of normal bowel function will improve to within specified parameters  Description  Establishment of normal bowel function will improve to within specified parameters  Outcome: Ongoing     Problem:  Bowel/Gastric:  Goal: Occurrences of diarrhea

## 2019-05-18 NOTE — PROGRESS NOTES
[address] [address]  SPEECH THERAPY MISSED TREATMENT NOTE  [facility] RENAL TELEMETRY 6K      Date: 2019  Patient Name: Marianne Velasquez        MRN: [id number]    : 1940  (77 y.o.)    REASON FOR MISSED TREATMENT:  Attempted to see pt at 80 for completion of BSE. Upon arrival, pt with significant lethargy, unable to open eyes or follow commands despite MAX cues provided; not appropriate to assess swallow function at date. Will check back on  to complete BSE, pending appropriateness.     Kelly Henry M.A., [address] [place] [address]

## 2019-05-18 NOTE — PROGRESS NOTES
[facility]   Nephrology progress note  5/18/2019, 11:41 AM      Pt Name:    Jen Carrillo  MRN:     [id number]     [place]:    1940  Admit Date:    5/16/2019  3:55 PM  Primary Care Physician:  Darron Mendes MD   Room number  4D-53/949-B    Chief Complaint: Nephrology following for KUMAR    Subjective:  Patient seen and examined  Unable to obtain any information from patient  Family including  and daughter in room  Urine output noted  Family spoke with Dr. Lugo Credit this morning    Objective:  24HR INTAKE/OUTPUT:      Intake/Output Summary (Last 24 hours) at 5/18/2019 1141  Last data filed at 5/18/2019 0414  Gross per 24 hour   Intake 1838.08 ml   Output 550 ml   Net 1288.08 ml     I/O last 3 completed shifts: In: 1838.1 [I.V.:1838.1]  Out: 550 [Urine:550]  No intake/output data recorded. Admission weight: 207 lb (93.9 kg)  Wt Readings from Last 3 Encounters:   05/18/19 209 lb 11.2 oz (95.1 kg)   10/04/17 207 lb 11.2 oz (94.2 kg)   08/25/16 189 lb (85.7 kg)     Body mass index is 39.62 kg/m².     Physical examination  VITALS:     Vitals:    05/18/19 1115   BP: (!) 111/40   Pulse: 65   Resp: 18   Temp: 97.6 °F (36.4 °C)   SpO2:      General Appearance: ill appearing, not responsive  Mouth/Throat: Oral mucosa dry  Neck: No JVD  Lungs: Air entry B/L, no rales, poor effort  Heart:  S1, S2 heard  GI: soft, non-tender, no guarding      Lab Data  CBC:   Recent Labs     05/16/19  1703 05/17/19  0631   WBC 12.4* 11.2*   HGB 10.2* 10.2*   HCT 35.5* 34.6*    266     BMP:  Recent Labs     05/17/19  0631  05/17/19  1900 05/18/19  0036 05/18/19  0616   *   < > 151* 150* 150*   K 5.7*   < > 5.5* 4.8 4.5   *   < > 116* 114* 114*   CO2 16*   < > 18* 19* 21*   *  --  129*  --  130*   CREATININE 6.9*  --  6.8*  --  6.5*   GLUCOSE 86  --  174*  --  138*   CALCIUM 8.5  --  8.2*  --  8.0*   MG  --   --  2.5*  --   --    PHOS 8.2*  --   --   --   --     < > = values in this interval not displayed. Hepatic:   Recent Labs     05/17/19  0631   LABALBU 2.0*         Meds:  Infusion:    sodium bicarbonate infusion 125 mL/hr at 05/18/19 0554    dextrose       Meds:    insulin regular  5 Units Intravenous Once    dextrose  25 g Intravenous Once    atenolol  25 mg Oral Daily    donepezil  20 mg Oral Daily    risperiDONE  0.25 mg Oral BID    venlafaxine  75 mg Oral Daily with breakfast    sodium chloride flush  10 mL Intravenous 2 times per day    docusate sodium  100 mg Oral BID    heparin (porcine)  5,000 Units Subcutaneous 3 times per day    ipratropium-albuterol  1 ampule Inhalation Q4H WA     Meds prn: glucose, dextrose, glucagon (rDNA), dextrose, sodium chloride flush, ondansetron, acetaminophen       Impression and Plan:  1. KUMAR: pre-renal vs ATN  - only slight improvement in creatinine, BUN stays high  - family reports hx dementia but daughter reports mentation declined in last 7 days  - cannot rule out uremia. Potassium has normalized at this time  - D/W  and daughter: they remain uncertain about dialysis at this time  - Family is discussing and they will let us know. - had long discussion with them    2. Hyperkalemia: improved  3. Met acidosis: improving with bicarbonate drip  4. Hypernatremia: continue with hypotonic fluids, may need to change to all D5W if bicarbonate and potassium remain stable    5.  Anemia    Long discussion with  and daughter    Loly Pickett MD  [facility]

## 2019-05-18 NOTE — PLAN OF CARE
Problem: Impaired respiratory status  Goal: Clear lung sounds  5/18/2019 1015 by Camila Chavez RCP  Outcome: Ongoing  Note:   Continue with aerosols to aid in bronchodilation.

## 2019-05-18 NOTE — PROGRESS NOTES
IM Progress Note  Dr. Edgardo Coello  5/18/2019 12:06 PM      Patient name Seda Shaw  WAT[birth year]  PCP: Tamra Khan MD  Admit Date: 5/16/2019  Acct No. [de-identified]    Subjective: Interval History:   Pt sleeping   D/w daughter at bedside    Diet: Diet NPO Effective Now    I/O last 3 completed shifts: In: 1838.1 [I.V.:1838.1]  Out: 550 [Urine:550]  No intake/output data recorded. Admission weight: 207 lb (93.9 kg) as of 5/16/2019  3:55 PM  Wt Readings from Last 3 Encounters:   05/18/19 209 lb 11.2 oz (95.1 kg)   10/04/17 207 lb 11.2 oz (94.2 kg)   08/25/16 189 lb (85.7 kg)     Body mass index is 39.62 kg/m². ROS   CVS;  no cp or palpitation  Resp: no SOB or cough  Neuro:  No numbness or weakness or dizziness  Abd: no nausea or vomiting or abd pain      Medications:   Scheduled Meds:   metroNIDAZOLE  500 mg Intravenous Q8H    insulin regular  5 Units Intravenous Once    dextrose  25 g Intravenous Once    atenolol  25 mg Oral Daily    donepezil  20 mg Oral Daily    risperiDONE  0.25 mg Oral BID    venlafaxine  75 mg Oral Daily with breakfast    sodium chloride flush  10 mL Intravenous 2 times per day    docusate sodium  100 mg Oral BID    heparin (porcine)  5,000 Units Subcutaneous 3 times per day    ipratropium-albuterol  1 ampule Inhalation Q4H WA     Continuous Infusions:   sodium bicarbonate infusion 125 mL/hr at 05/18/19 0554    dextrose         Labs :     CBC:   Recent Labs     05/16/19  1703 05/17/19  0631   WBC 12.4* 11.2*   HGB 10.2* 10.2*    266     BMP:    Recent Labs     05/17/19  0631  05/17/19  1900 05/18/19  0036 05/18/19  0616   *   < > 151* 150* 150*   K 5.7*   < > 5.5* 4.8 4.5   *   < > 116* 114* 114*   CO2 16*   < > 18* 19* 21*   *  --  129*  --  130*   CREATININE 6.9*  --  6.8*  --  6.5*   GLUCOSE 86  --  174*  --  138*    < > = values in this interval not displayed.      Hepatic: No results for input(s): AST, ALT, ALB, BILITOT, ALKPHOS in the last 72 hours. Troponin: No results for input(s): TROPONINI in the last 72 hours. BNP: No results for input(s): BNP in the last 72 hours. Lipids: No results for input(s): CHOL, HDL in the last 72 hours.     Invalid input(s): LDLCALCU  INR:   Recent Labs     05/17/19  0631   INR 1.32*       Radiology    Objective:   Vitals: BP (!) 111/40   Pulse 65   Temp 97.6 °F (36.4 °C) (Oral)   Resp 18   Ht 5' 1\" (1.549 m)   Wt 209 lb 11.2 oz (95.1 kg)   SpO2 (!) 89%   BMI 39.62 kg/m²   HEENT: Head:pupils react  Neck: supple  Lungs: clear to auscultation  Heart: regular rate and rhythm   Abdomen: soft BS heard   Extremities: warm   Neurologic:  Alsleep did wake up to name and fell asleep    Impression:   :   KUMAR  Recent C diff  hyperkalemia improved  HTN  Hyperlipidemia  Dementia      Plan:    Had extensive discussion with daughter who will d/w pts  and then decide about dialysis  palliative care also consulted   Cannot have NGT inserted sec to coiling   Change flagyl to Tierney Dejesus MD

## 2019-05-18 NOTE — PLAN OF CARE
Problem: Falls - Risk of:  Goal: Will remain free from falls  Description  Will remain free from falls  5/18/2019 1521 by Ismael Paulino RN  Note:   No falls this shift. Pt chronically immobile     Problem: Falls - Risk of:  Goal: Will remain free from falls  Description  Will remain free from falls  5/18/2019 1507 by Ismael Paulino RN  Outcome: Ongoing  Note:   No falls this shift. Call light in reach. Bed alarm on. Falling star protocol in place. Pt unable to ambulate at this time. Problem: Risk for Impaired Skin Integrity  Goal: Tissue integrity - skin and mucous membranes  Description  Structural intactness and normal physiological function of skin and  mucous membranes. 5/18/2019 1521 by Ismael Paulino RN  Outcome: Ongoing  Note:   No evidence of new skin breakdown at this time. Pt on [employer] mattress. Pt being turned and repositioned q2h and prn by nursing staff. Heels and elbows elevated off the bed. Barrier cream to affected skin. Problem: Risk for Impaired Skin Integrity  Goal: Tissue integrity - skin and mucous membranes  Description  Structural intactness and normal physiological function of skin and  mucous membranes. 5/18/2019 1507 by Ismael Paulino RN  Note:   No evidence of new skin breakdown at this time. Pt being turned and repositioned every 2 hours and prn by nursing staff. Pt frequently having Cdiff diarrhea so very often is incontinent. Barrier cream being applied to bottom in copious amounts. [employer] bed in use. Heels and elbows floated off bed.       05/18/19 0850   Gastrointestinal   Abdominal (WDL) X   Abdomen Inspection Soft;Rounded   Tenderness Soft   RUQ Bowel Sounds Active   LUQ Bowel Sounds Active   RLQ Bowel Sounds Active   LLQ Bowel Sounds Active   Peripheral Vascular   Peripheral Vascular (WDL) X   Edema Right lower extremity; Left lower extremity   RLE Edema Trace   LLE Edema Trace   Skin Color/Condition   Skin Color/Condition (WDL) WDL   Skin Integrity   Skin Integrity (WDL) X   Skin Integrity Abrasion;Bruising   Location scattered, lt leg   Multiple Skin Integrity Sites Yes   Skin Integrity Site 2   Skin Integrity Location 2 Redness  (blanchable)   Location 2 buttocks, bilat heels-floated   Skin Integrity Site 3   Skin Integrity Location 3 Excoriation    Location 3 groin        05/18/19 0850   Wound 05/18/19 Groin MASD   Date First Assessed/Time First Assessed: 05/18/19 0900   Primary Wound Type:  Other (comment)  Location: Groin  Wound Description (Comments): MASD   Wound Other  (MASD)   Wound Assessment Non-blanchable erythema  (redness/purple )        Problem: Discharge Planning:  Goal: Discharged to appropriate level of care  Description  Discharged to appropriate level of care  5/18/2019 1521 by Jv Nayak RN  Outcome: Ongoing  Note:   Plans for pt to be discharged to the [facility] when medically stable

## 2019-05-19 LAB
ANION GAP SERPL CALCULATED.3IONS-SCNC: 14 MEQ/L (ref 8–16)
ANION GAP SERPL CALCULATED.3IONS-SCNC: 14 MEQ/L (ref 8–16)
ANION GAP SERPL CALCULATED.3IONS-SCNC: 15 MEQ/L (ref 8–16)
BUN BLDV-MCNC: 108 MG/DL (ref 7–22)
CALCIUM SERPL-MCNC: 8.2 MG/DL (ref 8.5–10.5)
CHLORIDE BLD-SCNC: 104 MEQ/L (ref 98–111)
CHLORIDE BLD-SCNC: 108 MEQ/L (ref 98–111)
CHLORIDE BLD-SCNC: 111 MEQ/L (ref 98–111)
CO2: 25 MEQ/L (ref 23–33)
CREAT SERPL-MCNC: 5.3 MG/DL (ref 0.4–1.2)
GFR SERPL CREATININE-BSD FRML MDRD: 8 ML/MIN/1.73M2
GLUCOSE BLD-MCNC: 108 MG/DL (ref 70–108)
MAGNESIUM: 2.2 MG/DL (ref 1.6–2.4)
MAGNESIUM: 2.5 MG/DL (ref 1.6–2.4)
POTASSIUM REFLEX MAGNESIUM: 3.5 MEQ/L (ref 3.5–5.2)
POTASSIUM REFLEX MAGNESIUM: 4.2 MEQ/L (ref 3.5–5.2)
POTASSIUM SERPL-SCNC: 4.4 MEQ/L (ref 3.5–5.2)
REASON FOR REJECTION: NORMAL
REJECTED TEST: NORMAL
SODIUM BLD-SCNC: 144 MEQ/L (ref 135–145)
SODIUM BLD-SCNC: 147 MEQ/L (ref 135–145)
SODIUM BLD-SCNC: 150 MEQ/L (ref 135–145)

## 2019-05-19 PROCEDURE — 1200000003 HC TELEMETRY R&B

## 2019-05-19 PROCEDURE — 80051 ELECTROLYTE PANEL: CPT

## 2019-05-19 PROCEDURE — 99232 SBSQ HOSP IP/OBS MODERATE 35: CPT | Performed by: INTERNAL MEDICINE

## 2019-05-19 PROCEDURE — 2580000003 HC RX 258: Performed by: INTERNAL MEDICINE

## 2019-05-19 PROCEDURE — 94761 N-INVAS EAR/PLS OXIMETRY MLT: CPT

## 2019-05-19 PROCEDURE — 2500000003 HC RX 250 WO HCPCS: Performed by: INTERNAL MEDICINE

## 2019-05-19 PROCEDURE — 80048 BASIC METABOLIC PNL TOTAL CA: CPT

## 2019-05-19 PROCEDURE — 36415 COLL VENOUS BLD VENIPUNCTURE: CPT

## 2019-05-19 PROCEDURE — 83735 ASSAY OF MAGNESIUM: CPT

## 2019-05-19 PROCEDURE — 94640 AIRWAY INHALATION TREATMENT: CPT

## 2019-05-19 PROCEDURE — 6360000002 HC RX W HCPCS: Performed by: INTERNAL MEDICINE

## 2019-05-19 PROCEDURE — 2709999900 HC NON-CHARGEABLE SUPPLY

## 2019-05-19 PROCEDURE — 2700000000 HC OXYGEN THERAPY PER DAY

## 2019-05-19 PROCEDURE — 6370000000 HC RX 637 (ALT 250 FOR IP): Performed by: INTERNAL MEDICINE

## 2019-05-19 RX ORDER — DEXTROSE MONOHYDRATE 50 MG/ML
INJECTION, SOLUTION INTRAVENOUS CONTINUOUS
Status: DISCONTINUED | OUTPATIENT
Start: 2019-05-19 | End: 2019-05-20

## 2019-05-19 RX ADMIN — IPRATROPIUM BROMIDE AND ALBUTEROL SULFATE 1 AMPULE: .5; 3 SOLUTION RESPIRATORY (INHALATION) at 08:51

## 2019-05-19 RX ADMIN — Medication: at 02:52

## 2019-05-19 RX ADMIN — DEXTROSE MONOHYDRATE: 50 INJECTION, SOLUTION INTRAVENOUS at 15:47

## 2019-05-19 RX ADMIN — HEPARIN SODIUM 5000 UNITS: 5000 INJECTION INTRAVENOUS; SUBCUTANEOUS at 21:40

## 2019-05-19 RX ADMIN — METRONIDAZOLE 500 MG: 500 INJECTION, SOLUTION INTRAVENOUS at 13:06

## 2019-05-19 RX ADMIN — METRONIDAZOLE 500 MG: 500 INJECTION, SOLUTION INTRAVENOUS at 21:38

## 2019-05-19 RX ADMIN — IPRATROPIUM BROMIDE AND ALBUTEROL SULFATE 1 AMPULE: .5; 3 SOLUTION RESPIRATORY (INHALATION) at 22:51

## 2019-05-19 RX ADMIN — METRONIDAZOLE 500 MG: 500 INJECTION, SOLUTION INTRAVENOUS at 04:53

## 2019-05-19 RX ADMIN — HEPARIN SODIUM 5000 UNITS: 5000 INJECTION INTRAVENOUS; SUBCUTANEOUS at 15:47

## 2019-05-19 RX ADMIN — HEPARIN SODIUM 5000 UNITS: 5000 INJECTION INTRAVENOUS; SUBCUTANEOUS at 04:58

## 2019-05-19 RX ADMIN — IPRATROPIUM BROMIDE AND ALBUTEROL SULFATE 1 AMPULE: .5; 3 SOLUTION RESPIRATORY (INHALATION) at 17:00

## 2019-05-19 RX ADMIN — IPRATROPIUM BROMIDE AND ALBUTEROL SULFATE 1 AMPULE: .5; 3 SOLUTION RESPIRATORY (INHALATION) at 12:36

## 2019-05-19 ASSESSMENT — PAIN SCALES - GENERAL: PAINLEVEL_OUTOF10: 0

## 2019-05-19 ASSESSMENT — PAIN SCALES - WONG BAKER: WONGBAKER_NUMERICALRESPONSE: 0

## 2019-05-19 NOTE — PROGRESS NOTES
[facility]   Nephrology progress note  5/19/2019, 3:03 PM      Pt Name:    Brantley Prader  MRN:     [id number]     [place]:    1940  Admit Date:    5/16/2019  3:55 PM  Primary Care Physician:  Mee Soto MD   Room number  7X-49/710-K    Chief Complaint: Nephrology following for KUMAR    Subjective:  Patient seen and examined earlier during rounds  Doing better  Awake today  Daughter in room  Urine output is improving    Objective:  24HR INTAKE/OUTPUT:      Intake/Output Summary (Last 24 hours) at 5/19/2019 1503  Last data filed at 5/19/2019 0445  Gross per 24 hour   Intake 1585.83 ml   Output 475 ml   Net 1110.83 ml     I/O last 3 completed shifts: In: 1585.8 [I.V.:1585.8]  Out: 475 [Urine:475]  No intake/output data recorded. Admission weight: 207 lb (93.9 kg)  Wt Readings from Last 3 Encounters:   05/19/19 213 lb 9.6 oz (96.9 kg)   10/04/17 207 lb 11.2 oz (94.2 kg)   08/25/16 189 lb (85.7 kg)     Body mass index is 40.36 kg/m².     Physical examination  VITALS:     Vitals:    05/18/19 1115   BP: (!) 111/40   Pulse: 65   Resp: 18   Temp: 97.6 °F (36.4 °C)   SpO2:      General Appearance: ill appearing, but more awake today  Mouth/Throat: Oral mucosa dry  Neck: No JVD  Lungs: Air entry B/L, no rales, poor effort  Heart:  S1, S2 heard  GI: soft, non-tender, no guarding      Lab Data  CBC:   Recent Labs     05/16/19  1703 05/17/19  0631   WBC 12.4* 11.2*   HGB 10.2* 10.2*   HCT 35.5* 34.6*    266     BMP:  Recent Labs     05/17/19  0631  05/17/19  1900  05/18/19  0616 05/18/19  1613 05/19/19  0024 05/19/19  0619   *   < > 151*   < > 150* 148* 147* 150*   K 5.7*   < > 5.5*   < > 4.5 4.3 4.2 4.4   *   < > 116*   < > 114* 110 108 111   CO2 16*   < > 18*   < > 21* 24 25 25   *  --  129*  --  130*  --   --  108*   CREATININE 6.9*  --  6.8*  --  6.5*  --   --  5.3*   GLUCOSE 86  --  174*  --  138*  --   --  108   CALCIUM 8.5  --  8.2*  --  8.0*  --   --  8.2*   MG --   --  2.5*  --   --   --   --  2.5*   PHOS 8.2*  --   --   --   --   --   --   --     < > = values in this interval not displayed. Hepatic:   Recent Labs     05/17/19  0631   LABALBU 2.0*         Meds:  Infusion:    sodium bicarbonate infusion 125 mL/hr at 05/19/19 0252    dextrose       Meds:    metroNIDAZOLE  500 mg Intravenous Q8H    insulin regular  5 Units Intravenous Once    dextrose  25 g Intravenous Once    atenolol  25 mg Oral Daily    donepezil  20 mg Oral Daily    risperiDONE  0.25 mg Oral BID    venlafaxine  75 mg Oral Daily with breakfast    sodium chloride flush  10 mL Intravenous 2 times per day    docusate sodium  100 mg Oral BID    heparin (porcine)  5,000 Units Subcutaneous 3 times per day    ipratropium-albuterol  1 ampule Inhalation Q4H WA     Meds prn: glucose, dextrose, glucagon (rDNA), dextrose, sodium chloride flush, ondansetron, acetaminophen       Impression and Plan:  1. KUMAR: pre-renal vs ATN  - creatinine is improving, creat today is down to low 5s  - continue with IVF, change drip to D5W  - no acute indication for dialysis at this time    2. Hypernatremia: change IVF to D5W for now  3. Hyperkalemia: improved/resolved  4. Met acidosis: improved. Okay to stop bicarbonate drip  5.  Anemia    D/W daughter and RN    Loly Pickett MD  [facility]

## 2019-05-19 NOTE — PLAN OF CARE
Problem: Falls - Risk of:  Goal: Will remain free from falls  Description  Will remain free from falls  5/18/2019 2241 by Hernandez Bustillo RN  Outcome: Ongoing  Note:   No falls this shift. Falling star protocol in place. Bed alarm set. Bed in lowest position. Side rails up x2. Nonskid footwear on. Call light in reach of patient and family. Patient visually checked on hourly rounds. Problem: Falls - Risk of:  Goal: Absence of physical injury  Description  Absence of physical injury  Outcome: Ongoing     Problem: Risk for Impaired Skin Integrity  Goal: Tissue integrity - skin and mucous membranes  Description  Structural intactness and normal physiological function of skin and  mucous membranes. 5/18/2019 2241 by Hernandez Bustillo RN  Outcome: Ongoing  Note:   No new skin breakdown noted this shift. Pt being repositioned q2h and PRN. Heels elevated off of bed with pillow support. Problem: Discharge Planning:  Goal: Discharged to appropriate level of care  Description  Discharged to appropriate level of care  5/18/2019 2241 by Hernandez Bustillo RN  Outcome: Ongoing  Note:   Discharge planning continues. Pt from [facility]. Problem: Pain:  Goal: Pain level will decrease  Description  Pain level will decrease  5/18/2019 2241 by Hernandez Bustillo RN  Outcome: Ongoing  Note:   No facial grimacing note this shift. Pt unable to verbalize if she is in pain as she does not follow commands and answer questions appropriately. Problem: Diarrhea:  Goal: Bowel elimination is within specified parameters  Description  Bowel elimination is within specified parameters  5/18/2019 2241 by Hernandez Bustillo RN  Outcome: Ongoing  Note:   Pt continues to have frequent bowel movements. IV flagyl started today.        Problem: Diarrhea:  Goal: Passage of soft, formed stool  Description  Passage of soft, formed stool  Outcome: Ongoing     Problem: Diarrhea:  Goal: Establishment of normal bowel function will improve to within specified parameters  Description  Establishment of normal bowel function will improve to within specified parameters  Outcome: Ongoing     Problem: Bowel/Gastric:  Goal: Occurrences of diarrhea will decrease  Description  Occurrences of diarrhea will decrease  5/18/2019 2241 by Vee Whitehead RN  Outcome: Ongoing  Note:   Pt continues to be incontinent and have a bowel movement with every repositioning. Problem: Fluid Volume:  Goal: Will show no signs and symptoms of electrolyte imbalance  Description  Will show no signs and symptoms of electrolyte imbalance  Outcome: Ongoing  Note:   Results for Matthias Mtz (MRN [id number]) as of 5/18/2019 22:37   Ref. Range 5/18/2019 06:16   Sodium Latest Ref Range: 135 - 145 meq/L 150 (H)   Potassium Latest Ref Range: 3.5 - 5.2 meq/L 4.5   Chloride Latest Ref Range: 98 - 111 meq/L 114 (H)   CO2 Latest Ref Range: 23 - 33 meq/L 21 (L)   BUN Latest Ref Range: 7 - 22 mg/dL 130 (H)   Creatinine Latest Ref Range: 0.4 - 1.2 mg/dL 6.5 (HH)   Anion Gap Latest Ref Range: 8.0 - 16.0 meq/L 15.0   Est, Glom Filt Rate Latest Units: ml/min/1.73m2 6 (A)   Glucose Latest Ref Range: 70 - 108 mg/dL 138 (H)   Calcium Latest Ref Range: 8.5 - 10.5 mg/dL 8.0 (L)     Monitoring labs. Nephrology following. [place] in place. Pt continues to have low urine output.      Problem: Tissue Perfusion - Renal, Altered:  Goal: Electrolytes within specified parameters  Description  Electrolytes within specified parameters  Outcome: Ongoing     Problem: Tissue Perfusion - Renal, Altered:  Goal: Urine creatinine clearance will be within specified parameters  Description  Urine creatinine clearance will be within specified parameters  5/18/2019 2241 by Vee Whitehead RN  Outcome: Ongoing     Problem: Tissue Perfusion - Renal, Altered:  Goal: Serum creatinine will be within specified parameters  Description  Serum creatinine will be within specified parameters  Outcome: Ongoing  Note:   Creatinine continues to be elevated. Family discussing if they would like the patient to start hemodialysis. Problem: Tissue Perfusion - Renal, Altered:  Goal: Ability to achieve a balanced intake and output will improve  Description  Ability to achieve a balanced intake and output will improve  Outcome: Ongoing     Problem: DISCHARGE BARRIERS  Goal: Patient's continuum of care needs are met  Outcome: Ongoing     Problem: Nutrition  Goal: Optimal nutrition therapy  Outcome: Ongoing  Note:   Pt currently NPO. Unable to place an NG tube d/t hiatal hernia. Care plan reviewed with patient. Patient verbalizes understanding of the plan of care and contribute to goal setting.

## 2019-05-19 NOTE — PLAN OF CARE
Problem: Falls - Risk of:  Goal: Will remain free from falls  Description  Will remain free from falls  Outcome: Ongoing  Note:   No falls this shift. Call light in reach. Bed alarm on. Falling star protocol in place. Pt chronically on bedrest.      Problem: Risk for Impaired Skin Integrity  Goal: Tissue integrity - skin and mucous membranes  Description  Structural intactness and normal physiological function of skin and  mucous membranes. Outcome: Ongoing  Note:   No evidence of new skin breakdown at this time. Pt being turned every 2 hours and prn with staff. Most of the time, the pt being turned much more often because of frequent watery diarrhea. Barrier cream being heavily applied. [place] bed in use. Heels and elbows being elevated off bed on pillows. 05/19/19 0915   Skin Integrity   Skin Integrity (WDL) X   Skin Integrity Abrasion;Bruising   Location scattered   Skin Integrity Site 2   Skin Integrity Location 2 Redness  (blanchable)   Location 2 bilat heels   Skin Integrity Site 3   Skin Integrity Location 3 Excoriation    Location 3 groin   Skin Integrity Site 4   Skin Integrity Location 4 Redness  (red line)   Location 4 rt thigh/lt thigh      05/19/19 0915   Wound 05/18/19 Groin MASD   Date First Assessed/Time First Assessed: 05/18/19 0900   Primary Wound Type:  Other (comment)  Location: Groin  Wound Description (Comments): MASD   Wound Other  (MASD)   Dressing/Treatment Moisture barrier   Wound Assessment Non-blanchable erythema  (redness/purple)          Problem: Discharge Planning:  Goal: Discharged to appropriate level of care  Description  Discharged to appropriate level of care  Outcome: Ongoing  Note:   Pt plans on being d/c back to the [facility] when medically stable      Problem: Diarrhea:  Goal: Bowel elimination is within specified parameters  Description  Bowel elimination is within specified parameters  Outcome: Ongoing  Note:   Pt in Cdiff precautions and being treated with IV

## 2019-05-20 LAB
ANION GAP SERPL CALCULATED.3IONS-SCNC: 14 MEQ/L (ref 8–16)
ANION GAP SERPL CALCULATED.3IONS-SCNC: 14 MEQ/L (ref 8–16)
ANION GAP SERPL CALCULATED.3IONS-SCNC: 15 MEQ/L (ref 8–16)
BUN BLDV-MCNC: 91 MG/DL (ref 7–22)
CALCIUM SERPL-MCNC: 7.7 MG/DL (ref 8.5–10.5)
CHLORIDE BLD-SCNC: 104 MEQ/L (ref 98–111)
CHLORIDE BLD-SCNC: 105 MEQ/L (ref 98–111)
CHLORIDE BLD-SCNC: 106 MEQ/L (ref 98–111)
CO2: 24 MEQ/L (ref 23–33)
CO2: 24 MEQ/L (ref 23–33)
CO2: 25 MEQ/L (ref 23–33)
CREAT SERPL-MCNC: 4.3 MG/DL (ref 0.4–1.2)
EKG ATRIAL RATE: 147 BPM
EKG ATRIAL RATE: 90 BPM
EKG Q-T INTERVAL: 320 MS
EKG Q-T INTERVAL: 364 MS
EKG QRS DURATION: 84 MS
EKG QRS DURATION: 88 MS
EKG QTC CALCULATION (BAZETT): 435 MS
EKG QTC CALCULATION (BAZETT): 454 MS
EKG R AXIS: 13 DEGREES
EKG R AXIS: 21 DEGREES
EKG T AXIS: -118 DEGREES
EKG T AXIS: -31 DEGREES
EKG VENTRICULAR RATE: 121 BPM
EKG VENTRICULAR RATE: 86 BPM
GFR SERPL CREATININE-BSD FRML MDRD: 10 ML/MIN/1.73M2
GLUCOSE BLD-MCNC: 121 MG/DL (ref 70–108)
MAGNESIUM: 2.1 MG/DL (ref 1.6–2.4)
POTASSIUM REFLEX MAGNESIUM: 3.6 MEQ/L (ref 3.5–5.2)
POTASSIUM REFLEX MAGNESIUM: 3.7 MEQ/L (ref 3.5–5.2)
POTASSIUM SERPL-SCNC: 3.4 MEQ/L (ref 3.5–5.2)
SODIUM BLD-SCNC: 143 MEQ/L (ref 135–145)
SODIUM BLD-SCNC: 144 MEQ/L (ref 135–145)
SODIUM BLD-SCNC: 144 MEQ/L (ref 135–145)
TROPONIN T: 0.05 NG/ML
TSH SERPL DL<=0.05 MIU/L-ACNC: 2.42 UIU/ML (ref 0.4–4.2)

## 2019-05-20 PROCEDURE — 94761 N-INVAS EAR/PLS OXIMETRY MLT: CPT

## 2019-05-20 PROCEDURE — 93005 ELECTROCARDIOGRAM TRACING: CPT | Performed by: INTERNAL MEDICINE

## 2019-05-20 PROCEDURE — 94668 MNPJ CHEST WALL SBSQ: CPT

## 2019-05-20 PROCEDURE — 36415 COLL VENOUS BLD VENIPUNCTURE: CPT

## 2019-05-20 PROCEDURE — 6370000000 HC RX 637 (ALT 250 FOR IP): Performed by: INTERNAL MEDICINE

## 2019-05-20 PROCEDURE — 2709999900 HC NON-CHARGEABLE SUPPLY

## 2019-05-20 PROCEDURE — 6360000002 HC RX W HCPCS: Performed by: INTERNAL MEDICINE

## 2019-05-20 PROCEDURE — 93010 ELECTROCARDIOGRAM REPORT: CPT | Performed by: INTERNAL MEDICINE

## 2019-05-20 PROCEDURE — 2500000003 HC RX 250 WO HCPCS: Performed by: INTERNAL MEDICINE

## 2019-05-20 PROCEDURE — 2580000003 HC RX 258: Performed by: INTERNAL MEDICINE

## 2019-05-20 PROCEDURE — 2700000000 HC OXYGEN THERAPY PER DAY

## 2019-05-20 PROCEDURE — 84484 ASSAY OF TROPONIN QUANT: CPT

## 2019-05-20 PROCEDURE — 80048 BASIC METABOLIC PNL TOTAL CA: CPT

## 2019-05-20 PROCEDURE — 1200000003 HC TELEMETRY R&B

## 2019-05-20 PROCEDURE — 84443 ASSAY THYROID STIM HORMONE: CPT

## 2019-05-20 PROCEDURE — 83735 ASSAY OF MAGNESIUM: CPT

## 2019-05-20 PROCEDURE — 92610 EVALUATE SWALLOWING FUNCTION: CPT

## 2019-05-20 PROCEDURE — 94640 AIRWAY INHALATION TREATMENT: CPT

## 2019-05-20 PROCEDURE — 99232 SBSQ HOSP IP/OBS MODERATE 35: CPT | Performed by: INTERNAL MEDICINE

## 2019-05-20 PROCEDURE — 80051 ELECTROLYTE PANEL: CPT

## 2019-05-20 RX ORDER — POTASSIUM CHLORIDE 7.45 MG/ML
10 INJECTION INTRAVENOUS
Status: DISCONTINUED | OUTPATIENT
Start: 2019-05-20 | End: 2019-05-20

## 2019-05-20 RX ORDER — METOPROLOL TARTRATE 5 MG/5ML
2.5 INJECTION INTRAVENOUS ONCE
Status: COMPLETED | OUTPATIENT
Start: 2019-05-20 | End: 2019-05-20

## 2019-05-20 RX ORDER — SODIUM CHLORIDE 450 MG/100ML
INJECTION, SOLUTION INTRAVENOUS CONTINUOUS
Status: DISCONTINUED | OUTPATIENT
Start: 2019-05-20 | End: 2019-05-23

## 2019-05-20 RX ADMIN — METRONIDAZOLE 500 MG: 500 INJECTION, SOLUTION INTRAVENOUS at 04:18

## 2019-05-20 RX ADMIN — HEPARIN SODIUM 5000 UNITS: 5000 INJECTION INTRAVENOUS; SUBCUTANEOUS at 13:19

## 2019-05-20 RX ADMIN — IPRATROPIUM BROMIDE AND ALBUTEROL SULFATE 1 AMPULE: .5; 3 SOLUTION RESPIRATORY (INHALATION) at 08:15

## 2019-05-20 RX ADMIN — IPRATROPIUM BROMIDE AND ALBUTEROL SULFATE 1 AMPULE: .5; 3 SOLUTION RESPIRATORY (INHALATION) at 21:58

## 2019-05-20 RX ADMIN — ATENOLOL 25 MG: 25 TABLET ORAL at 09:03

## 2019-05-20 RX ADMIN — POTASSIUM BICARBONATE 20 MEQ: 782 TABLET, EFFERVESCENT ORAL at 16:36

## 2019-05-20 RX ADMIN — SODIUM CHLORIDE: 4.5 INJECTION, SOLUTION INTRAVENOUS at 19:27

## 2019-05-20 RX ADMIN — SODIUM CHLORIDE: 4.5 INJECTION, SOLUTION INTRAVENOUS at 09:28

## 2019-05-20 RX ADMIN — METOPROLOL TARTRATE 2.5 MG: 5 INJECTION INTRAVENOUS at 02:47

## 2019-05-20 RX ADMIN — METRONIDAZOLE 500 MG: 500 INJECTION, SOLUTION INTRAVENOUS at 13:19

## 2019-05-20 RX ADMIN — HEPARIN SODIUM 5000 UNITS: 5000 INJECTION INTRAVENOUS; SUBCUTANEOUS at 04:19

## 2019-05-20 RX ADMIN — POTASSIUM BICARBONATE 20 MEQ: 782 TABLET, EFFERVESCENT ORAL at 11:55

## 2019-05-20 RX ADMIN — VENLAFAXINE HYDROCHLORIDE 75 MG: 75 CAPSULE, EXTENDED RELEASE ORAL at 09:05

## 2019-05-20 RX ADMIN — DEXTROSE MONOHYDRATE: 50 INJECTION, SOLUTION INTRAVENOUS at 00:03

## 2019-05-20 RX ADMIN — RISPERIDONE 0.25 MG: 0.25 TABLET ORAL at 09:05

## 2019-05-20 RX ADMIN — METRONIDAZOLE 500 MG: 500 INJECTION, SOLUTION INTRAVENOUS at 20:56

## 2019-05-20 RX ADMIN — DONEPEZIL HYDROCHLORIDE 20 MG: 10 TABLET, FILM COATED ORAL at 09:05

## 2019-05-20 RX ADMIN — IPRATROPIUM BROMIDE AND ALBUTEROL SULFATE 1 AMPULE: .5; 3 SOLUTION RESPIRATORY (INHALATION) at 15:45

## 2019-05-20 RX ADMIN — HEPARIN SODIUM 5000 UNITS: 5000 INJECTION INTRAVENOUS; SUBCUTANEOUS at 20:56

## 2019-05-20 RX ADMIN — IPRATROPIUM BROMIDE AND ALBUTEROL SULFATE 1 AMPULE: .5; 3 SOLUTION RESPIRATORY (INHALATION) at 12:11

## 2019-05-20 ASSESSMENT — PAIN SCALES - WONG BAKER
WONGBAKER_NUMERICALRESPONSE: 0

## 2019-05-20 NOTE — PROGRESS NOTES
[facility]      Progress Note  [facility] Renal Telemetry 6K       Patient: Reginia Bernheim  Unit/Bed: 9N-48/674-L  YOB: 1940  MRN: [id number]  Acct: [de-identified]   Admitting Diagnosis: Acute renal failure (ARF) ([address] [N17.9]  Admit Date:  5/16/2019  Hospital Day: 4    Interval History:    Patient is on admission for acute renal failure and currently being treated with supportive measures and medication adjustment. The following Services are consulting: Nephrology     Chart, Labs, Radiology studies, and Consults reviewed. Subjective:  Patient seen and examined. Events over the weekend noted. Suly Wheeler is doing much better, has been able to avoid RRT. There are no new complaints today.     Objective:   BP (!) 108/52   Pulse 77   Temp 98.2 °F (36.8 °C) (Axillary)   Resp 16   Ht 5' 1\" (1.549 m)   Wt 213 lb 9.6 oz (96.9 kg)   SpO2 98%   BMI 40.36 kg/m²       Intake/Output Summary (Last 24 hours) at 5/20/2019 1641  Last data filed at 5/20/2019 1518  Gross per 24 hour   Intake 4394 ml   Output 1175 ml   Net 3219 ml        General appearance: alert, appears stated age and cooperative  Lungs: clear to auscultation bilaterally  Heart: regular rate and rhythm, S1, S2 normal, no murmur, click, rub or gallop  Abdomen: soft, non-tender; bowel sounds normal; no masses,  no organomegaly  Extremities: extremities normal, atraumatic, no cyanosis or edema  Pulses: 2+ and symmetric  Skin: Skin color, texture, turgor normal. No rashes or lesions  Neurologic: Grossly normal    [place]:  Drains:  Central Line/port:   EKG:  Imaging:      Scheduled Meds:   metroNIDAZOLE  500 mg Intravenous Q8H    insulin regular  5 Units Intravenous Once    dextrose  25 g Intravenous Once    atenolol  25 mg Oral Daily    donepezil  20 mg Oral Daily    risperiDONE  0.25 mg Oral BID    venlafaxine  75 mg Oral Daily with breakfast    sodium chloride flush  10 mL Intravenous 2 times per day    docusate sodium  100 mg Oral BID    heparin (porcine)  5,000 Units Subcutaneous 3 times per day    ipratropium-albuterol  1 ampule Inhalation Q4H WA     Continuous Infusions:   sodium chloride 100 mL/hr at 05/20/19 0928    dextrose       PRN Meds:glucose, dextrose, glucagon (rDNA), dextrose, sodium chloride flush, ondansetron, acetaminophen    Diet:  DIET GENERAL;  Dietary Nutrition Supplements: Renal Oral Supplement    DVT Prophylaxis:    Data:  CBC: No results for input(s): WBC, RBC, HGB, HCT, MCV, RDW, PLT in the last 72 hours. BMP:   Recent Labs     05/18/19  0616  05/19/19  0619  05/20/19  0038 05/20/19  0342 05/20/19  1433   *   < > 150*   < > 144 144 143   K 4.5   < > 4.4   < > 3.7 3.4* 3.6   *   < > 111   < > 106 105 104   CO2 21*   < > 25   < > 24 25 24   *  --  108*  --   --  91*  --    CREATININE 6.5*  --  5.3*  --   --  4.3*  --     < > = values in this interval not displayed. BNP: No results for input(s): BNP in the last 72 hours. PT/INR: No results for input(s): PROTIME, INR in the last 72 hours. APTT: No results for input(s): APTT in the last 72 hours. CARDIAC ENZYMES:   Recent Labs     05/20/19  0342   TROPONINT 0.054*           Principal Problem:    Acute renal failure (ARF) ([facility])  Active Problems: Moderate malnutrition ([facility]  Resolved Problems:    * No resolved hospital problems. *      Active Issues    Assessment & Plan:     1. Acute Renal Failure  - etiology unclear  - ongoing; much improved  - Cr down from 6.4 to 4.3  - hyperkalemia and metabolic acidosis resolved  - Nephrology following; continue current mgt    2. Acute Metabolic Encephalopathy  - clinically much improved  - now awake and alert; tolerating PO  - continue to address underlying causes    3. New-onset AF  - paroxysmal; converted with Metoprolol IV  - will monitor closely    4. E. coli UTI  - will restart Ceftriaxone per sensitivity    5.  Continue supportive measures and other current management    Electronically signed by Emperatriz Marmolejo MD on 5/20/2019 at 4:41 PM    Attending Physician

## 2019-05-20 NOTE — PLAN OF CARE
Problem: Impaired respiratory status  Goal: Clear lung sounds  Outcome: Ongoing  Note:   Continue with aerosols to aid in bronchodilation.

## 2019-05-20 NOTE — PLAN OF CARE
Latest Ref Range: 1.6 - 2.4 mg/dL 2.2        Problem: Tissue Perfusion - Renal, Altered:  Goal: Serum creatinine will be within specified parameters  Description  Serum creatinine will be within specified parameters  Outcome: Ongoing  Note:   Creatinine remains elevated. BMP for AM. Nephrology following. Problem: Tissue Perfusion - Renal, Altered:  Goal: Urine creatinine clearance will be within specified parameters  Description  Urine creatinine clearance will be within specified parameters  Outcome: Ongoing     Problem: Tissue Perfusion - Renal, Altered:  Goal: Ability to achieve a balanced intake and output will improve  Description  Ability to achieve a balanced intake and output will improve  Outcome: Ongoing  Note:   Monitoring I&O and daily weights. Urine output increasing. Fluids running at 125 ml/hr. Problem: Nutrition  Goal: Optimal nutrition therapy  Outcome: Ongoing  Note:   Pt NPO. Swallow eval to be completed. Problem: Physical Regulation:  Goal: Prevent transmision of infection  Description  Prevent transmision of infection  Outcome: Ongoing  Note:   Pt in contact plus precautions. Care plan reviewed with patient. Patient verbalizes understanding of the plan of care and contribute to goal setting.

## 2019-05-20 NOTE — FLOWSHEET NOTE
05/20/19 0144   Provider Notification   Reason for Communication Evaluate  (new onset afib with RVR)   Provider Name Dr. Jacky Escoto   Provider Notification Physician   Method of Communication Secure Message   Response Waiting for response     0220-Spoke with Dr. Jacky Escoto about patient being in new onset afib with RVR. Order received for one time dose of IV lopressor. She stated wait 10-15 minutes and if the HR is not sustaining less than 110, to start a cardizem gtt. If the cardizem gtt needs to be started, then stop SQ heparin and start a high dose heparin gtt with pharmacy to dose. 0222-Updated Dr. Jacky Escoto on patient current code status being DNR-CC. This RN and Apollo Ricketts RN spoke with the patient's , who is her POA. Explained current code status vs. Limited no x4. Patient's  stated he wants her to be a limited no x4. Order received for code status change to limited no x4.

## 2019-05-20 NOTE — PROGRESS NOTES
[address] [address]  SPEECH THERAPY  [facility] RENAL TELEMETRY 6K  Bedside Swallowing Evaluation      SLP Individual Minutes  Time In: 0745  Time Out: 9082  Minutes: 10  Timed Code Treatment Minutes: 0 Minutes       Date: 2019  Patient Name: Seda Shaw      CSN: [id number]   : 1940  (77 y.o.)  Gender: female   Referring Physician:  Dr Ramona Taylor  Diagnosis: AMS  Secondary Diagnosis:  dysphagia  History of Present Illness/Injury: Pt admit with the above diagnosis. See physician H&P for details. Acute aspiriation episode upon admission with pt made NPO. Now with improved alertness and mental status and apprppriate to complete eval to determine safety for resuming oral diet  Past Medical History:   Diagnosis Date    Aspiration pneumonia ([address]  bilateral  from large hiatus hernia 2017    Dementia     Depression     GERD (gastroesophageal reflux disease)     Hyperlipidemia     Hypertension        Pain:  Did not state    Current Diet: NPO     Respiratory Status:  [x] Nasal Cannula     Behavioral Observation: [x] Alert [] Oriented [x] Confused [] Lethargic      [] Dysarthric [x] Limited Direction Following [] Agitated      [] Other:    ORAL MECHANISM EVALUATION:         Comments:  Facial / Labial [x]WFL [] Impaired []DNT    Lingual [x]WFL [] Impaired []DNT    Dentition [x]WFL [] Impaired []DNT    Velum [x]WFL [] Impaired []DNT    Vocal Quality [x]WFL [] Impaired []DNT    Sensation []WFL [] Impaired [x]DNT    Cough []WFL [] Impaired [x]DNT    Other: []WFL [] Impaired []DNT    Other: []WFL [] Impaired []DNT        PATIENT WAS EVALUATED USING:  Ice chips, thin liquids by straw, pudding, and jaren cracker    ORAL PHASE: [x] WFL      PHARYNGEAL PHASE: [x] WFL: Pharyngeal phase appears WFLs, but cannot completely rule out pharyngeal phase deficits from a bedside swallow evaluation alone.     SIGNS AND SYMPTOMS OF LARYNGEAL PENETRATION / ASPIRATION:  [x] NO sign/symptoms of aspiration evident with this evaluation, but cannot rule out silent aspiration. IMPRESSIONS: Pt presents with essentially normal oral and pharyngeal function with no overt s/s of aspiration with solids or liquids. Recommend regular and thin diet with assistance for feeding due to decreased mentation. Will follow up 1-2 times to ensure tolerance of oral diet. RECOMMENDATIONS:     Modified Barium Swallow: [] Is indicated to further assess    [x] Is NOT indicated at this time; Will recommend as appropriate. DIET RECOMMENDATIONS:  Regular and thin    STRATEGIES: [] Strategies pending MBS results. [x] Full upright position  [x] Small bite/sip [] No Straw [] Multiple Swallow  [] Chin tuck [] Head turn [] Pulmonary monitoring [] Oral care after all meals  [] Supervision  [x] Medication in applesauce []Direct 1:1 Supervision  [] Spoon all liquids [] Alternate solid / liquid [] Limit distractions [] Monitor for fatigue  [] PMV in place for all po [] OTHER:      EDUCATION:   Learner: [x]Patient [x] Significant other [] Son/Daughter [] Parent     [] Other:   Education: [x] Reviewed results and recommendations of this evaluation     [x] Reviewed diet and strategies     [] Reviewed signs, symptoms and risk of aspiration     [] Demonstrated how to thick liquid appropriately. [] Reviewed goals and Plan of Care     [] OTHER:   Method: [x] Discussion [] Demonstration [] Hand-out     [] OTHER:   Evaluation of Education:     [x] Verbalizes understanding: spouse  [] Demonstrates with assistance     [] Demonstrates without assistance  [x]Needs further instruction     [x] No evidence of learning: patient  [] Family not present    PATIENT GOALS: [] Pt did not state. Will further assess during treatment.      [] Return to the least restricted diet possible     [] Return to previous level of function     [] OTHER:    PLAN / TREATMENT RECOMMENDATIONS:  [x] Skilled SLP intervention on acute care 3-5 x per week or until goals met and/or pt plateaus in function. Specific interventions for next session may include: diet monitor    SHORT TERM GOALS:  Short-term Goals  Timeframe for Short-term Goals: 2 weeks  Goal 1: Pt will tolerate regular and thin diet without s/s of aspiration to ensure safe and adequate nutrition and hydration.     LONG TERM GOALS:  No LTG due to short ELOS       Reyes [id number]

## 2019-05-20 NOTE — PROGRESS NOTES
Renal Progress Note  [facility]    Patient :  Sarwat Dowling; 77 y.o. MRN# [id number]  Location:  [facility]  Attending:  Darwin Ulloa MD  Admit Date:  5/16/2019   Hospital Day: 4      Subjective:     Nephrology is following the patient for KUMAR, electrolyte abnormalities. Patient much more awake. Urine output improving. She went into Afib with RVR last night with HR in the 140s . She was given IV Lopressor. HR in 80s now.  at bedside. Outpatient Medications:     Medications Prior to Admission: acetaminophen (TYLENOL) 325 MG tablet, Take 650 mg by mouth every 6 hours as needed for Pain  albuterol (PROVENTIL) (2.5 MG/3ML) 0.083% nebulizer solution, Take 2.5 mg by nebulization every 6 hours as needed for Wheezing  LORazepam (ATIVAN) 0.5 MG tablet, Take 0.5 mg by mouth every 6 hours as needed for Anxiety.   divalproex (DEPAKOTE SPRINKLE) 125 MG capsule, Take 125 mg by mouth 2 times daily  metroNIDAZOLE (FLAGYL) 500 MG tablet, Take 500 mg by mouth 3 times daily  hydrALAZINE (APRESOLINE) 10 MG tablet, Take 10 mg by mouth 3 times daily  losartan-hydrochlorothiazide (HYZAAR) 100-12.5 MG per tablet, Take 1 tablet by mouth daily  omeprazole (PRILOSEC) 40 MG delayed release capsule, Take 40 mg by mouth daily  ranitidine (ZANTAC) 15 MG/ML syrup, Take by mouth 2 times daily  furosemide (LASIX) 20 MG tablet, Take 20 mg by mouth 2 times daily  potassium chloride (KLOR-CON) 20 MEQ packet, Take 20 mEq by mouth 2 times daily  venlafaxine (EFFEXOR XR) 75 MG extended release capsule, Take 1 capsule by mouth daily (with breakfast)  risperiDONE (RISPERDAL) 0.25 MG tablet, Take 1 tablet by mouth 2 times daily  atenolol (TENORMIN) 50 MG tablet, Take 1 tablet by mouth daily Hold for systolic blood pressure less than 110, heart rate less than 60  ferrous gluconate (FERGON) 240 (27 Fe) MG tablet, Take 1.5 tablets by mouth 3 times daily (with meals)  sucralfate (CARAFATE) 1 GM/10ML suspension, Take 10 mLs by mouth 4 times daily (before meals and nightly)  atorvastatin (LIPITOR) 40 MG tablet, Take 40 mg by mouth daily  donepezil (ARICEPT) 10 MG tablet, Take 20 mg by mouth daily   ALPRAZolam (XANAX) 0.5 MG tablet, Take 1 tablet by mouth every 8 hours as needed for Anxiety  pantoprazole (PROTONIX) 40 MG tablet, Take 1 tablet by mouth 2 times daily (before meals)  loperamide (IMODIUM) 2 MG capsule, Take 2 mg by mouth nightly. Current Medications:     Scheduled Meds:    potassium chloride  10 mEq Intravenous Q1H    metroNIDAZOLE  500 mg Intravenous Q8H    insulin regular  5 Units Intravenous Once    dextrose  25 g Intravenous Once    atenolol  25 mg Oral Daily    donepezil  20 mg Oral Daily    risperiDONE  0.25 mg Oral BID    venlafaxine  75 mg Oral Daily with breakfast    sodium chloride flush  10 mL Intravenous 2 times per day    docusate sodium  100 mg Oral BID    heparin (porcine)  5,000 Units Subcutaneous 3 times per day    ipratropium-albuterol  1 ampule Inhalation Q4H WA     Continuous Infusions:    sodium chloride      dextrose       PRN Meds:  glucose, dextrose, glucagon (rDNA), dextrose, sodium chloride flush, ondansetron, acetaminophen    Input/Output:       I/O last 3 completed shifts: In: 6257 [I.V.:2835]  Out: 925 [Urine:925].       Patient Vitals for the past 96 hrs (Last 3 readings):   Weight   19 0445 213 lb 9.6 oz (96.9 kg)   19 0414 209 lb 11.2 oz (95.1 kg)   19 0502 212 lb 14.4 oz (96.6 kg)       Vital Signs:   Temperature:  Temp: 98.5 °F (36.9 °C)  TMax:   Temp (24hrs), Av.3 °F (36.8 °C), Min:97.4 °F (36.3 °C), Max:98.6 °F (37 °C)    Respirations:  Resp: 18  Pulse:   Pulse: 80  BP:    BP: (!) 105/59  BP Range: Systolic (04NGY), KALEB:449 , Min:102 , NUV:245       Diastolic (80LQT), CYF:76, Min:42, Max:95      Physical Examination:     General:  Awake, no acute distress, demented  HEENT: NC/AT/ MMM  Chest:               Clear anteriorly  Cardiac:  Irregularly irregular  Abdomen: Soft, non-tender,  Neuro:  No facial droop, No Asterixis  SKIN:  No rashes, good skin turgor. Extremities:  No edema, no cyanosis    Labs:     No results for input(s): WBC, RBC, HGB, HCT, MCV, MCH, MCHC, RDW, PLT, MPV in the last 72 hours. BMP:   Recent Labs     05/18/19  0616  05/19/19  0619 05/19/19  1815 05/20/19  0038 05/20/19  0342   *   < > 150* 144 144 144   K 4.5   < > 4.4 3.5 3.7 3.4*   *   < > 111 104 106 105   CO2 21*   < > 25 25 24 25   *  --  108*  --   --  91*   CREATININE 6.5*  --  5.3*  --   --  4.3*   GLUCOSE 138*  --  108  --   --  121*   CALCIUM 8.0*  --  8.2*  --   --  7.7*    < > = values in this interval not displayed. Phosphorus:   No results for input(s): PHOS in the last 72 hours. Magnesium:    Recent Labs     05/19/19  0619 05/19/19 1815 05/20/19  0342   MG 2.5* 2.2 2.1     Albumin:  No results for input(s): LABALBU in the last 72 hours.   BNP:    No results found for: BNP  DIEGO:    No results found for: DIEGO  SPEP:  Lab Results   Component Value Date    PROT 5.9 09/27/2017     UPEP:   No results found for: LABPE  C3:   No results found for: C3  C4:   No results found for: C4  MPO ANCA:   No results found for: MPO  PR3 ANCA:   No results found for: PR3  Anti-GBM:   No results found for: GBMABIGG  Hep BsAg:       No results found for: HEPBSAG  Hep C AB:        No results found for: HEPCAB    Urinalysis/Chemistries:      Lab Results   Component Value Date    NITRU POSITIVE 05/16/2019    COLORU DARK YELLOW 05/16/2019    PHUR 5.0 05/16/2019    LABCAST 4-8 HYALINE 09/22/2017    LABCAST NONE SEEN 09/22/2017    WBCUA > 200 05/16/2019    RBCUA 25-50 05/16/2019    MUCUS NONE SEEN 05/16/2019    YEAST NONE SEEN 05/16/2019    BACTERIA NONE 05/16/2019    SPECGRAV 1.028 09/22/2017    LEUKOCYTESUR LARGE 05/16/2019    UROBILINOGEN 0.2 05/16/2019    BILIRUBINUR SMALL 05/16/2019    BLOODU SMALL 05/16/2019    GLUCOSEU NEGATIVE 05/16/2019    KETUA TRACE 05/16/2019

## 2019-05-20 NOTE — PLAN OF CARE
Problem: Falls - Risk of:  Goal: Will remain free from falls  Description  Will remain free from falls  5/20/2019 0948 by Bita Barrios RN  Outcome: Ongoing  Note:   No falls noted this shift. Continue falling star program. Bed alarm on, bed in low position. Call light and personal belongings in reach. Patient uses call light appropriately. Problem: Risk for Impaired Skin Integrity  Goal: Tissue integrity - skin and mucous membranes  Description  Structural intactness and normal physiological function of skin and  mucous membranes. 5/20/2019 0948 by Bita Barrios RN  Outcome: Ongoing  Note:   Redness to buttocks and heels. No new signs or symptoms of skin breakdown noted this shift, encouraging patient to turn and reposition self in bed q2h. Problem: Discharge Planning:  Goal: Discharged to appropriate level of care  Description  Discharged to appropriate level of care  5/20/2019 0948 by Bita Barrios RN  Outcome: Ongoing  Note:   [facility] to [facility]     Problem: Pain:  Goal: Pain level will decrease  Description  Pain level will decrease  Outcome: Ongoing  Note:   JOVITA     Problem: Diarrhea:  Goal: Bowel elimination is within specified parameters  Description  Bowel elimination is within specified parameters  5/20/2019 0948 by Bita Barrios RN  Outcome: Ongoing  Note:   On Flagyl for Cdiff      Problem: Bowel/Gastric:  Goal: Occurrences of diarrhea will decrease  Description  Occurrences of diarrhea will decrease  Outcome: Ongoing  Note:   On Flagyl for cdiff, stools decreasing      Problem: Tissue Perfusion - Renal, Altered:  Goal: Electrolytes within specified parameters  Description  Electrolytes within specified parameters  5/20/2019 0948 by Bita Barrios RN  Outcome: Ongoing  Note:   Creatinine improving. Down to 4.3 today. Nephrology following.       Problem: DISCHARGE BARRIERS  Goal: Patient's continuum of care needs are met  Outcome: Ongoing  Note:   Return to [place] [facility] at d/c      Problem: Nutrition  Goal: Optimal nutrition therapy  5/20/2019 0948 by All Youngblood RN  Outcome: Ongoing  Note:   Speech eval, advanced to general diet. Problem: Physical Regulation:  Goal: Prevent transmision of infection  Description  Prevent transmision of infection  5/20/2019 0948 by All Youngblood RN  Outcome: Ongoing  Note:   Remains in contact plus isolation      Care plan reviewed with patient. Patient verbalize understanding of the plan of care and contribute to goal setting.

## 2019-05-21 LAB
ANION GAP SERPL CALCULATED.3IONS-SCNC: 14 MEQ/L (ref 8–16)
BUN BLDV-MCNC: 76 MG/DL (ref 7–22)
CALCIUM SERPL-MCNC: 8 MG/DL (ref 8.5–10.5)
CHLORIDE BLD-SCNC: 103 MEQ/L (ref 98–111)
CO2: 23 MEQ/L (ref 23–33)
CREAT SERPL-MCNC: 3.5 MG/DL (ref 0.4–1.2)
GFR SERPL CREATININE-BSD FRML MDRD: 13 ML/MIN/1.73M2
GLUCOSE BLD-MCNC: 83 MG/DL (ref 70–108)
POTASSIUM SERPL-SCNC: 3.5 MEQ/L (ref 3.5–5.2)
POTASSIUM SERPL-SCNC: 3.9 MEQ/L (ref 3.5–5.2)
SODIUM BLD-SCNC: 140 MEQ/L (ref 135–145)

## 2019-05-21 PROCEDURE — 97110 THERAPEUTIC EXERCISES: CPT

## 2019-05-21 PROCEDURE — 6360000002 HC RX W HCPCS: Performed by: INTERNAL MEDICINE

## 2019-05-21 PROCEDURE — 2580000003 HC RX 258: Performed by: INTERNAL MEDICINE

## 2019-05-21 PROCEDURE — 97530 THERAPEUTIC ACTIVITIES: CPT

## 2019-05-21 PROCEDURE — 6370000000 HC RX 637 (ALT 250 FOR IP): Performed by: INTERNAL MEDICINE

## 2019-05-21 PROCEDURE — 94668 MNPJ CHEST WALL SBSQ: CPT

## 2019-05-21 PROCEDURE — 99232 SBSQ HOSP IP/OBS MODERATE 35: CPT | Performed by: INTERNAL MEDICINE

## 2019-05-21 PROCEDURE — 94761 N-INVAS EAR/PLS OXIMETRY MLT: CPT

## 2019-05-21 PROCEDURE — 84132 ASSAY OF SERUM POTASSIUM: CPT

## 2019-05-21 PROCEDURE — 94640 AIRWAY INHALATION TREATMENT: CPT

## 2019-05-21 PROCEDURE — 97163 PT EVAL HIGH COMPLEX 45 MIN: CPT

## 2019-05-21 PROCEDURE — 36415 COLL VENOUS BLD VENIPUNCTURE: CPT

## 2019-05-21 PROCEDURE — 2500000003 HC RX 250 WO HCPCS: Performed by: INTERNAL MEDICINE

## 2019-05-21 PROCEDURE — 1200000003 HC TELEMETRY R&B

## 2019-05-21 PROCEDURE — 80048 BASIC METABOLIC PNL TOTAL CA: CPT

## 2019-05-21 RX ORDER — FENOPROFEN CALCIUM 200 MG
CAPSULE ORAL 3 TIMES DAILY PRN
Status: ON HOLD | COMMUNITY
End: 2019-05-24 | Stop reason: HOSPADM

## 2019-05-21 RX ORDER — RISPERIDONE 0.25 MG/1
0.12 TABLET, FILM COATED ORAL EVERY MORNING
Status: DISCONTINUED | OUTPATIENT
Start: 2019-05-22 | End: 2019-05-24 | Stop reason: HOSPADM

## 2019-05-21 RX ORDER — LORAZEPAM 0.5 MG/1
0.5 TABLET ORAL 3 TIMES DAILY PRN
Status: ON HOLD | COMMUNITY
End: 2019-05-24 | Stop reason: HOSPADM

## 2019-05-21 RX ORDER — SUCRALFATE ORAL 1 G/10ML
1 SUSPENSION ORAL
Status: ON HOLD | COMMUNITY
End: 2019-05-24 | Stop reason: HOSPADM

## 2019-05-21 RX ORDER — VENLAFAXINE 37.5 MG/1
37.5 TABLET ORAL 2 TIMES DAILY
COMMUNITY

## 2019-05-21 RX ORDER — RISPERIDONE 0.25 MG/1
0.12 TABLET, FILM COATED ORAL EVERY MORNING
COMMUNITY

## 2019-05-21 RX ORDER — RISPERIDONE 0.25 MG/1
0.25 TABLET, FILM COATED ORAL NIGHTLY
COMMUNITY

## 2019-05-21 RX ORDER — POTASSIUM CHLORIDE 7.45 MG/ML
10 INJECTION INTRAVENOUS
Status: COMPLETED | OUTPATIENT
Start: 2019-05-21 | End: 2019-05-21

## 2019-05-21 RX ORDER — MIDODRINE HYDROCHLORIDE 5 MG/1
5 TABLET ORAL EVERY 8 HOURS PRN
Status: DISCONTINUED | OUTPATIENT
Start: 2019-05-21 | End: 2019-05-24 | Stop reason: HOSPADM

## 2019-05-21 RX ORDER — VENLAFAXINE 37.5 MG/1
37.5 TABLET ORAL 2 TIMES DAILY
Status: DISCONTINUED | OUTPATIENT
Start: 2019-05-22 | End: 2019-05-24 | Stop reason: HOSPADM

## 2019-05-21 RX ORDER — RISPERIDONE 0.25 MG/1
0.25 TABLET, FILM COATED ORAL NIGHTLY
Status: DISCONTINUED | OUTPATIENT
Start: 2019-05-21 | End: 2019-05-24 | Stop reason: HOSPADM

## 2019-05-21 RX ADMIN — METRONIDAZOLE 500 MG: 500 INJECTION, SOLUTION INTRAVENOUS at 05:09

## 2019-05-21 RX ADMIN — RISPERIDONE 0.25 MG: 0.25 TABLET ORAL at 08:21

## 2019-05-21 RX ADMIN — HEPARIN SODIUM 5000 UNITS: 5000 INJECTION INTRAVENOUS; SUBCUTANEOUS at 05:06

## 2019-05-21 RX ADMIN — POTASSIUM CHLORIDE 10 MEQ: 7.46 INJECTION, SOLUTION INTRAVENOUS at 16:20

## 2019-05-21 RX ADMIN — POTASSIUM CHLORIDE 10 MEQ: 7.46 INJECTION, SOLUTION INTRAVENOUS at 18:35

## 2019-05-21 RX ADMIN — SODIUM CHLORIDE: 4.5 INJECTION, SOLUTION INTRAVENOUS at 16:20

## 2019-05-21 RX ADMIN — HEPARIN SODIUM 5000 UNITS: 5000 INJECTION INTRAVENOUS; SUBCUTANEOUS at 21:35

## 2019-05-21 RX ADMIN — IPRATROPIUM BROMIDE AND ALBUTEROL SULFATE 1 AMPULE: .5; 3 SOLUTION RESPIRATORY (INHALATION) at 21:13

## 2019-05-21 RX ADMIN — HEPARIN SODIUM 5000 UNITS: 5000 INJECTION INTRAVENOUS; SUBCUTANEOUS at 15:15

## 2019-05-21 RX ADMIN — POTASSIUM CHLORIDE 10 MEQ: 7.46 INJECTION, SOLUTION INTRAVENOUS at 15:15

## 2019-05-21 RX ADMIN — VENLAFAXINE HYDROCHLORIDE 75 MG: 75 CAPSULE, EXTENDED RELEASE ORAL at 08:21

## 2019-05-21 RX ADMIN — IPRATROPIUM BROMIDE AND ALBUTEROL SULFATE 1 AMPULE: .5; 3 SOLUTION RESPIRATORY (INHALATION) at 17:43

## 2019-05-21 RX ADMIN — DONEPEZIL HYDROCHLORIDE 20 MG: 10 TABLET, FILM COATED ORAL at 08:21

## 2019-05-21 RX ADMIN — CEFTRIAXONE SODIUM 1 G: 1 INJECTION, POWDER, FOR SOLUTION INTRAMUSCULAR; INTRAVENOUS at 14:29

## 2019-05-21 RX ADMIN — RISPERIDONE 0.25 MG: 0.25 TABLET ORAL at 21:35

## 2019-05-21 RX ADMIN — IPRATROPIUM BROMIDE AND ALBUTEROL SULFATE 1 AMPULE: .5; 3 SOLUTION RESPIRATORY (INHALATION) at 08:24

## 2019-05-21 RX ADMIN — IPRATROPIUM BROMIDE AND ALBUTEROL SULFATE 1 AMPULE: .5; 3 SOLUTION RESPIRATORY (INHALATION) at 12:24

## 2019-05-21 RX ADMIN — SODIUM CHLORIDE: 4.5 INJECTION, SOLUTION INTRAVENOUS at 05:06

## 2019-05-21 RX ADMIN — ATENOLOL 25 MG: 25 TABLET ORAL at 08:21

## 2019-05-21 RX ADMIN — POTASSIUM CHLORIDE 10 MEQ: 7.46 INJECTION, SOLUTION INTRAVENOUS at 17:23

## 2019-05-21 ASSESSMENT — PAIN SCALES - WONG BAKER: WONGBAKER_NUMERICALRESPONSE: 0

## 2019-05-21 NOTE — PLAN OF CARE
parameters  Description  Establishment of normal bowel function will improve to within specified parameters  5/20/2019 2347 by Aziza Mitchell RN  Outcome: Ongoing     Problem: Bowel/Gastric:  Goal: Occurrences of diarrhea will decrease  Description  Occurrences of diarrhea will decrease  5/20/2019 2347 by Aziza Mitchell RN  Outcome: Ongoing     Problem: Fluid Volume:  Goal: Will show no signs and symptoms of electrolyte imbalance  Description  Will show no signs and symptoms of electrolyte imbalance  5/20/2019 2347 by Aziza Mitchell RN  Outcome: Ongoing  Note:   Results for Jake Barrier (MRN [id number]) as of 5/20/2019 23:47   Ref. Range 5/20/2019 14:33   Sodium Latest Ref Range: 135 - 145 meq/L 143   Potassium Latest Ref Range: 3.5 - 5.2 meq/L 3.6   Chloride Latest Ref Range: 98 - 111 meq/L 104   CO2 Latest Ref Range: 23 - 33 meq/L 24   Anion Gap Latest Ref Range: 8.0 - 16.0 meq/L 15.0       Monitoring electrolytes. Nephrology following. Problem: Tissue Perfusion - Renal, Altered:  Goal: Electrolytes within specified parameters  Description  Electrolytes within specified parameters  5/20/2019 2347 by Aziza Mitchell RN  Outcome: Ongoing     Problem: Tissue Perfusion - Renal, Altered:  Goal: Urine creatinine clearance will be within specified parameters  Description  Urine creatinine clearance will be within specified parameters  5/20/2019 2347 by Aziza Mitchell RN  Outcome: Ongoing     Problem: Tissue Perfusion - Renal, Altered:  Goal: Serum creatinine will be within specified parameters  Description  Serum creatinine will be within specified parameters  5/20/2019 2347 by Aziza Mitchell RN  Outcome: Ongoing  Note:   Creatinine remains elevated. Nephrology following.       Problem: Tissue Perfusion - Renal, Altered:  Goal: Ability to achieve a balanced intake and output will improve  Description  Ability to achieve a balanced intake and output will improve  5/20/2019 2347 by Radhika Cherry Bro Christie RN  Outcome: Ongoing  Note:   Monitoring intake and output. [place] remains in place. Problem: DISCHARGE BARRIERS  Goal: Patient's continuum of care needs are met  5/20/2019 2347 by Serjio Garcia RN  Outcome: Ongoing     Problem: Nutrition  Goal: Optimal nutrition therapy  5/20/2019 2347 by Serjio Garcia RN  Outcome: Ongoing  Note:   Pt advanced to a general diet today. Problem: Physical Regulation:  Goal: Prevent transmision of infection  Description  Prevent transmision of infection  5/20/2019 2347 by Serjio Garcia RN  Outcome: Ongoing  Note:   Pt remains in contact plus isolation. Care plan reviewed with patient. Patient verbalizes understanding of the plan of care and contribute to goal setting.

## 2019-05-21 NOTE — PROGRESS NOTES
Renal Progress Note  [facility]    Patient :  Reginia Bernheim; 77 y.o. MRN# [id number]  Location:  679/782-O  Attending:  Gerry Glass MD  Admit Date:  5/16/2019   Hospital Day: 5      Subjective:     Nephrology is following the patient for KUMAR, electrolyte abnormalities. Patient seen and examined. Daughter at bedside. Still with loose stools. Otherwise improving. Outpatient Medications:     Medications Prior to Admission: albuterol (PROVENTIL) (5 MG/ML) 0.5% nebulizer solution, Take 2.5 mg by nebulization every 6 hours  LORazepam (ATIVAN) 0.5 MG tablet, Take 0.5 mg by mouth 3 times daily as needed for Anxiety. sucralfate (CARAFATE) 1 GM/10ML suspension, Take 1 g by mouth 3 times daily (before meals)  hydrocortisone 1 % lotion, Apply topically 3 times daily as needed (to bilateral arms)  risperiDONE (RISPERDAL) 0.25 MG tablet, Take 0.125 mg by mouth every morning  risperiDONE (RISPERDAL) 0.25 MG tablet, Take 0.25 mg by mouth nightly  Dextromethorphan-guaiFENesin (ROBITUSSIN COUGH+CHEST BOYD DM) 5-100 MG/5ML LIQD, Take 5 mLs by mouth every 4 hours as needed (cough)  venlafaxine (EFFEXOR) 37.5 MG tablet, Take 37.5 mg by mouth 2 times daily  acetaminophen (TYLENOL) 325 MG tablet, Take 650 mg by mouth every 4 hours as needed for Pain   LORazepam (ATIVAN) 0.5 MG tablet, Take 0.5 mg by mouth 2 times daily.    divalproex (DEPAKOTE SPRINKLE) 125 MG capsule, Take 125 mg by mouth 2 times daily  hydrALAZINE (APRESOLINE) 10 MG tablet, Take 10 mg by mouth 3 times daily  losartan-hydrochlorothiazide (HYZAAR) 100-12.5 MG per tablet, Take 1 tablet by mouth daily  omeprazole (PRILOSEC) 40 MG delayed release capsule, Take 40 mg by mouth daily  ranitidine (ZANTAC) 15 MG/ML syrup, Take 150 mg by mouth 2 times daily   atenolol (TENORMIN) 50 MG tablet, Take 1 tablet by mouth daily Hold for systolic blood pressure less than 110, heart rate less than 60  atorvastatin (LIPITOR) 40 MG tablet, Take 40 mg by mouth daily  donepezil (ARICEPT) 10 MG tablet, Take 20 mg by mouth daily   [] metroNIDAZOLE (FLAGYL) 500 MG tablet, Take 500 mg by mouth 3 times daily  [DISCONTINUED] furosemide (LASIX) 20 MG tablet, Take 20 mg by mouth 2 times daily  [DISCONTINUED] potassium chloride (KLOR-CON) 20 MEQ packet, Take 20 mEq by mouth 2 times daily  [DISCONTINUED] ALPRAZolam (XANAX) 0.5 MG tablet, Take 1 tablet by mouth every 8 hours as needed for Anxiety  [DISCONTINUED] ferrous gluconate (FERGON) 240 (27 Fe) MG tablet, Take 1.5 tablets by mouth 3 times daily (with meals)  [DISCONTINUED] pantoprazole (PROTONIX) 40 MG tablet, Take 1 tablet by mouth 2 times daily (before meals)  [DISCONTINUED] loperamide (IMODIUM) 2 MG capsule, Take 2 mg by mouth nightly. Current Medications:     Scheduled Meds:    cefTRIAXone (ROCEPHIN) IV  1 g Intravenous Q24H    [START ON 2019] risperiDONE  0.125 mg Oral QAM    risperiDONE  0.25 mg Oral Nightly    [START ON 2019] venlafaxine  37.5 mg Oral BID    potassium chloride  10 mEq Intravenous Q1H    insulin regular  5 Units Intravenous Once    dextrose  25 g Intravenous Once    atenolol  25 mg Oral Daily    donepezil  20 mg Oral Daily    sodium chloride flush  10 mL Intravenous 2 times per day    docusate sodium  100 mg Oral BID    heparin (porcine)  5,000 Units Subcutaneous 3 times per day    ipratropium-albuterol  1 ampule Inhalation Q4H WA     Continuous Infusions:    sodium chloride 100 mL/hr at 19 0506    dextrose       PRN Meds:  midodrine, glucose, dextrose, glucagon (rDNA), dextrose, sodium chloride flush, ondansetron, acetaminophen    Input/Output:       I/O last 3 completed shifts: In: 2869.3 [P.O.:160; I.V.:2709.3]  Out: 625 [Urine:625].       Patient Vitals for the past 96 hrs (Last 3 readings):   Weight   19 0445 213 lb 9.6 oz (96.9 kg)   19 0414 209 lb 11.2 oz (95.1 kg)       Vital Signs:   Temperature:  Temp: 98 °F (36.7 °C)  TMax:   Temp (24hrs), Av.5 °F (36.9 °C), Min:98 °F (36.7 °C), Max:99.3 °F (37.4 °C)    Respirations:  Resp: 19  Pulse:   Pulse: 59  BP:    BP: (!) 109/40  BP Range: Systolic (40ZPS), CYJ:393 , Min:102 , LCM:324       Diastolic (32XIS), CLP:19, Min:37, Max:86      Physical Examination:     General:  Awake, no acute distress, demented  HEENT: NC/AT/ MMM  Chest:               Clear anteriorly  Cardiac:  S1, S2  Abdomen: Soft, non-tender,  Neuro:  No facial droop, No Asterixis  SKIN:  No rashes, good skin turgor. Extremities:  No edema, no cyanosis    Labs:     No results for input(s): WBC, RBC, HGB, HCT, MCV, MCH, MCHC, RDW, PLT, MPV in the last 72 hours. BMP:   Recent Labs     19  0619  19  0342 19  1433 19  0719   *   < > 144 143 140   K 4.4   < > 3.4* 3.6 3.5      < > 105 104 103   CO2 25   < > 25 24 23   *  --  91*  --  76*   CREATININE 5.3*  --  4.3*  --  3.5*   GLUCOSE 108  --  121*  --  83   CALCIUM 8.2*  --  7.7*  --  8.0*    < > = values in this interval not displayed. Phosphorus:   No results for input(s): PHOS in the last 72 hours. Magnesium:    Recent Labs     19  0619 19  1815 19  0342   MG 2.5* 2.2 2.1     Albumin:  No results for input(s): LABALBU in the last 72 hours.   BNP:    No results found for: BNP  DIGEO:    No results found for: DIEGO  SPEP:  Lab Results   Component Value Date    PROT 5.9 2017     UPEP:   No results found for: LABPE  C3:   No results found for: C3  C4:   No results found for: C4  MPO ANCA:   No results found for: MPO  PR3 ANCA:   No results found for: PR3  Anti-GBM:   No results found for: GBMABIGG  Hep BsAg:       No results found for: HEPBSAG  Hep C AB:        No results found for: HEPCAB    Urinalysis/Chemistries:      Lab Results   Component Value Date    NITRU POSITIVE 2019    COLORU DARK YELLOW 2019    PHUR 5.0 2019    LABCAST 4-8 HYALINE 2017    LABCAST NONE SEEN 2017    WBCUA > 200 05/16/2019    RBCUA 25-50 05/16/2019    MUCUS NONE SEEN 05/16/2019    YEAST NONE SEEN 05/16/2019    BACTERIA NONE 05/16/2019    SPECGRAV 1.028 09/22/2017    LEUKOCYTESUR LARGE 05/16/2019    UROBILINOGEN 0.2 05/16/2019    BILIRUBINUR SMALL 05/16/2019    BLOODU SMALL 05/16/2019    GLUCOSEU NEGATIVE 05/16/2019    KETUA TRACE 05/16/2019    AMORPHOUS DEBRIS 05/16/2019     Urine Sodium:   No results found for: ABIGAIL  Urine Potassium:  No results found for: KUR  Urine Chloride:  No results found for: CLUR  Urine Osmolarity:   Lab Results   Component Value Date    OSMOU 376 05/17/2019     Urine Protein:   No components found for: TOTALPROTEIN, URINE   Urine Creatinine:     Lab Results   Component Value Date    LABCREA 98.3 05/17/2019     Urine Eosinophils:  No components found for: UEOS        Impression and Plan:  1. KUMAR due to severe volume depletion + meds - improving. Start PRN midodrine for hypotension  2. S/p hyperkalemia, now with hypokalemia - KCl 40 meq IVPB  3. Metabolic acidosis - resolved  4. Hypernatremia -resolved  5. Metabolic encephalopathy - improved  6. New onset Afib,started on Atenolol  7. E. Coli UTI  8. Recent C. Diff  9. Dementia            Please don't hesitate to call with any questions.   Electronically signed by Cherelle Liu DO on 5/21/2019 at 2:04 PM

## 2019-05-21 NOTE — PROGRESS NOTES
Pharmacy Medication History Note      List of current medications patient is taking is complete. Source of information: last dose MAR from [facility]    Changes made to medication list:  Medications removed (include reason, ex. therapy complete or physician discontinued): Albuterol - dose adjustment  Alprazolam - therapy complete  Ferrous gluconate - therapy complete  Furosemide - therapy complete  Loperamide - therapy complete  Pantoprazole - therapy complete  Potassium - therapy complete  Risperidone - dose adjustment  Sucralfate - dose adjustment  Venlafaxine ER - dose adjustment    Medications added/doses adjusted:  Acetaminophen 325 mg - take 650 mg by mouth every 4 hours as needed for pain  Albuterol 5 mg/mL - inhale 2.5 mg (0.5 mL) via nebulizer every 6 hours  Dextromethorphan-guaifenesin 5-100 mg/5 mL - take 5 mL by mouth every 4 hours as needed for cough  Hydrocortisone 1% lotion - apply topically three times daily to bilateral arms  Lorazepam 0.5 mg - take 1 tablet by mouth twice daily  Lorazepam 0.5 mg - take 1 tablet by mouth three times daily as needed for anxiety  Risperidone 0.25 mg - take 0.125 mg by mouth every morning  Risperidone 0.25 mg - take 0.25 mg by mouth nightly  Sucralfate 1 gm/10 mL - take 1 gm by mouth three times daily before meals  Venlafaxine 37.5 mg - take 1 tablet by mouth twice daily    Other notes (ex. Recent course of antibiotics, Coumadin dosing):  Patient started 10 day course of metronidazole on 5/10/2019. Denies use of other OTC or herbal medications.       Allergies reviewed      Electronically signed by Shane Sanchez, [address] [place] [address] on 5/21/2019 at 1:17 PM

## 2019-05-21 NOTE — PROGRESS NOTES
Pharmacy Admission Medication Reconciliation Note    Patient admitted to [facility] for: acute renal failure    Recommended corrections to inpatient orders  - Change risperidone to 0.125 mg every morning and 0.25 mg every evening  - Change venlafaxine ER 75 mg daily to venlafaxine 37.5 mg twice daily    Prescriber contacted:  Dr. Arvind Red    Recommendations accepted:    Yes    Darlene Calderon, PharmD, BCPS  5/21/2019  1:41 PM

## 2019-05-21 NOTE — FLOWSHEET NOTE
05/20/19 2110   Encounter Summary   Services provided to: Patient   Referral/Consult From: [facility]   Support System Spouse   Continue Visiting Yes  (5/20-Spouse)   Complexity of Encounter Low   Length of Encounter 15 minutes   Grief and Life Adjustment   Type Adjustment to illness   Assessment Approachable   Intervention Nurtured hope;Explored feelings, thoughts, concerns   Outcome Expressed gratitude     Assessment: The patient has severe dementia and really isn't able to communicate. Her  has been staying here with her so it helps her feel more secure and familiar in the surroundings. After speaking with the patient briefly, I talked to the . He was in the family break room and explained about how exhausted he is. He doesn't know what more he can do at this point but wants to keep her at home with him as long as he can. - She came in to the hospital due to kidney disease so he knows this isn't a good prognosis. He didn't speak of any additional support system available to him. Plan: Support for the spouse of this patient would be helpful to him. Just giving him someone to talk to while he is here. His calm demeanor will reflect on her anxiety level.

## 2019-05-21 NOTE — PLAN OF CARE
Problem: Impaired respiratory status  Goal: Clear lung sounds  5/20/2019 2201 by Woo Castillo RCP  Outcome: Ongoing     Continue with breathing treatments to achieve clear lung sounds.

## 2019-05-21 NOTE — FLOWSHEET NOTE
[facility] MISSED TREATMENT [facility]  [facility] RENAL TELEMETRY 6K  6K-26/026-A      Date: 2019  Patient Name: Heather Mackenzie        CSN: [id number]   : 1940  (77 y.o.)  Gender: female                REASON FOR MISSED TREATMENT:  Missed Treat. Per PT, pt has no OT needs at this time as currently is at baseline. Dependent for ADLs and sailaja lift for transfers. Will discontinue orders at this time.

## 2019-05-21 NOTE — PLAN OF CARE
Problem: Falls - Risk of:  Goal: Will remain free from falls  Description  Will remain free from falls  Outcome: Ongoing  Note:   Call light within reach. Side rails up x2. Bed alarm on. Non skid slippers available. Will continue to monitor closely. Problem: Risk for Impaired Skin Integrity  Goal: Tissue integrity - skin and mucous membranes  Description  Structural intactness and normal physiological function of skin and  mucous membranes. Outcome: Ongoing  Note:   Patient free from any new skin breakdown. Patient is being turned every two hours with frequent positional changes to decrease her risk for any new pressure ulcers. Patient and patient's family educated on the importance of turning every two hours. Will continue to monitor. Problem: Discharge Planning:  Goal: Discharged to appropriate level of care  Description  Discharged to appropriate level of care  Outcome: Ongoing  Note:   Patient plans to be [facility] when medically stable. Problem: Pain:  Goal: Pain level will decrease  Description  Pain level will decrease  Outcome: Ongoing  Note:   Patient free from pain this shift. Pain rated on 0-10 pain rating scale. Will continue to reassess. Problem: Impaired respiratory status  Goal: Clear lung sounds  5/21/2019 0915 by Luisa Prabhakar RCP  Outcome: Ongoing  Note:   Cont aerosol to improve aeration     Problem: Bowel/Gastric:  Goal: Occurrences of diarrhea will decrease  Description  Occurrences of diarrhea will decrease  Outcome: Ongoing  Note:   Patient still remains in contact plus isolation due to C-diff isolation precaution. Patient and patients family member educated on the importance of wearing a gown, gloves, and washing hands after entering and exiting the room. Patient and patient's family verbalized understanding.       Problem: Fluid Volume:  Goal: Will show no signs and symptoms of electrolyte imbalance  Description  Will show no signs and symptoms of electrolyte imbalance  Outcome: Ongoing  Note:   Patient has shown no signs of fluid volume overload this shift. Patients Potassium is slightly on the lower side of 3.5, 40mEq's are being given via IV to help with patients potassium at this time. Will continue to monitor closely. Care plan reviewed with patient. Patient verbalize understanding of the plan of care and contribute to goal setting.

## 2019-05-21 NOTE — PROGRESS NOTES
[facility]  INPATIENT PHYSICAL THERAPY  EVALUATION  [facility] RENAL TELEMETRY 6K - 1G-84/697-F    Time In: 0007  Time Out: 1433  Timed Code Treatment Minutes: 23 Minutes  Minutes: 35          Date: 2019  Patient Name: Lea Woods,  Gender:  female        MRN: [id number]  : 1940  (77 y.o.)      Referring Practitioner: Ivis Fenton MD  Diagnosis: Acute renal failure  Additional Pertinent Hx: Lea Woods is a 77 y.o. female who presents to the Emergency Department via EMS with a medical history of dementia for the evaluation of dehydration. The patient currently resides at the [facility]. NH reports that the patient has been constantly experiencing diarrhea and states that lab work was completed today which revealed a creatinine of 6.8 and hyperkalemia at 6.1. Patient tested positive for C-diff. Patient's family states that the patient has not had any oral intake in the past 7 days and the patient was sent to the ED due to her lab work and presenting condition. Past Medical History:   Diagnosis Date    Aspiration pneumonia (HCC)  bilateral  from large hiatus hernia 2017    Dementia     Depression     GERD (gastroesophageal reflux disease)     Hyperlipidemia     Hypertension      Past Surgical History:   Procedure Laterality Date    CHOLECYSTECTOMY      COLONOSCOPY      HI ESOPHAGOGASTRODUODENOSCOPY TRANSORAL DIAGNOSTIC N/A 2017    EGD ESOPHAGOGASTRODUODENOSCOPY performed by Nell Sheffield MD at [address] [place] [address]         Restrictions/Precautions:  Fall Risk         Other position/activity restrictions: dementia       Subjective:  Chart Reviewed: Yes  Patient assessed for rehabilitation services?: Yes  Family / Caregiver Present: Yes(daughter)  Subjective: RN approved session, pt's daughter present and very supportive. Daughter reports pt is dependent at the nursing home for all care, pt has dementia.     General:  Follows Commands: Impaired        Pain:  Other (comment)(moans in pain with PROM). Social/Functional History:    Type of Home: Facility([facility])  Home Layout: One level  Home Access: Level entry            Additional Comments: Per daughter has been in a NH x 1.5 years, has been at the [facility] for almost 6 months; per daughter, pt was [employer] Friday lift at [facility], has not amb in over 6 months      Objective:  RLE PROM: WFL  RLE General PROM: ankle DF to neutral       LLE PROM: WFL  LLE General PROM: ankle DF to neutral        Rolling to Left: Dependent/Total  Rolling to Right: Dependent/Total             Exercises:  Comments: Pt receives PROM B heel slides, ankle pumps, x 10 reps each, family ed with daughter with instruction and demonstration on how to stretch heel cords to prevent contractures. Activity Tolerance:  Activity Tolerance: Patient limited by cognitive status  Activity Tolerance: Limited PLOF    Treatment Initiated: See above exercises, extensive time spent on family education with daughter. Assessment: Body structures, Functions, Activity limitations: Decreased functional mobility , Decreased ROM, Decreased cognition  Assessment: Pt tolerates session fair, daughter states pt is more alert than she has been, initiated some UE activity when eating lunch. Pt is dependent at baseline for all care and mobility, did some family education on exercises and positioning while in bed. No further PT services warranted at this time in the acute setting. Pt can be re-evaluated at [facility] facility if necessary to determine needs in that setting. Prognosis: Fair    Clinical Presentation: High - Unstable with Unpredictable Characteristics: Pt tolerates session fair, daughter states pt is more alert than she has been, initiated some UE activity when eating lunch. Pt is dependent at baseline for all care and mobility, did some family education on exercises and positioning while in bed.   No further PT services warranted at this time in the acute setting. Pt can be re-evaluated at [facility] facility if necessary to determine needs in that setting.:    Decision Making: High Complexitybased on patient assessment and decision making process of determining plan of care and establishing reasonable expectations for measurable functional outcomes    REQUIRES PT FOLLOW UP: No    Discharge Recommendations:  Discharge Recommendations: ECF without PT    Patient Education:  Patient Education: POC, exercises, positioning to prevent pressure ulcers    Equipment Recommendations:  Equipment Needed: No    Safety:  Type of devices: All fall risk precautions in place, Bed alarm in place, Call light within reach, Left in bed, Nurse notified    Plan:  Times per week: NA    Goals:  Patient goals : daughter's goals to increase pt's strength and mobility       Evaluation Complexity: Based on the findings of patient history, examination, clinical presentation, and decision making during this evaluation, the evaluation of Severa Sheehan  is of high complexity.             AM-PAC Inpatient Mobility without Stair Climbing Raw Score : 5  AM-PAC Inpatient without Stair Climbing T-Scale Score : 23.59  Mobility Inpatient CMS 0-100% Score: 100  Mobility Inpatient without Stair CMS G-Code Modifier : CN

## 2019-05-21 NOTE — PROGRESS NOTES
[facility]      Progress Note  [facility] Renal Telemetry 6K       Patient: Bhavna Gaviria  Unit/Bed: 3A-49/728-K  YOB: 1940  MRN: [id number]  Acct: [de-identified]   Admitting Diagnosis: Acute renal failure (ARF) ([facility] [address] [N17.9]  Admit Date:  5/16/2019  Hospital Day: 5    Interval History:    Patient is on admission for acute renal failure and currently being treated with supportive measures and medication adjustment. The following Services are consulting: Nephrology     Chart, Labs, Radiology studies, and Consults reviewed. Subjective:  Patient seen and examined. There are no new complaints today.     Objective:   BP (!) 109/40   Pulse 59   Temp 98 °F (36.7 °C) (Axillary)   Resp 18   Ht 5' 1\" (1.549 m)   Wt 213 lb 9.6 oz (96.9 kg)   SpO2 92%   BMI 40.36 kg/m²       Intake/Output Summary (Last 24 hours) at 5/21/2019 1201  Last data filed at 5/21/2019 6584  Gross per 24 hour   Intake 2819.34 ml   Output 475 ml   Net 2344.34 ml        General appearance: alert, appears stated age and cooperative  Lungs: clear to auscultation bilaterally  Heart: regular rate and rhythm, S1, S2 normal, no murmur, click, rub or gallop  Abdomen: soft, non-tender; bowel sounds normal; no masses,  no organomegaly  Extremities: extremities normal, atraumatic, no cyanosis or edema  Pulses: 2+ and symmetric  Skin: Skin color, texture, turgor normal. No rashes or lesions  Neurologic: Grossly normal    [place]:  Drains:  Central Line/port:   EKG:  Imaging:      Scheduled Meds:   cefTRIAXone (ROCEPHIN) IV  1 g Intravenous Q24H    insulin regular  5 Units Intravenous Once    dextrose  25 g Intravenous Once    atenolol  25 mg Oral Daily    donepezil  20 mg Oral Daily    risperiDONE  0.25 mg Oral BID    venlafaxine  75 mg Oral Daily with breakfast    sodium chloride flush  10 mL Intravenous 2 times per day    docusate sodium  100 mg Oral BID    heparin (porcine)  5,000 Units Subcutaneous 3 times per day    ipratropium-albuterol  1 ampule Inhalation Q4H WA     Continuous Infusions:   sodium chloride 100 mL/hr at 05/21/19 0506    dextrose       PRN Meds:glucose, dextrose, glucagon (rDNA), dextrose, sodium chloride flush, ondansetron, acetaminophen    Diet:  DIET GENERAL;  Dietary Nutrition Supplements: Renal Oral Supplement    DVT Prophylaxis:    Data:  CBC: No results for input(s): WBC, RBC, HGB, HCT, MCV, RDW, PLT in the last 72 hours. BMP:   Recent Labs     05/19/19  0619  05/20/19  0342 05/20/19  1433 05/21/19  0719   *   < > 144 143 140   K 4.4   < > 3.4* 3.6 3.5      < > 105 104 103   CO2 25   < > 25 24 23   *  --  91*  --  76*   CREATININE 5.3*  --  4.3*  --  3.5*    < > = values in this interval not displayed. BNP: No results for input(s): BNP in the last 72 hours. PT/INR: No results for input(s): PROTIME, INR in the last 72 hours. APTT: No results for input(s): APTT in the last 72 hours. CARDIAC ENZYMES:   Recent Labs     05/20/19  0342   TROPONINT 0.054*           Principal Problem:    Acute renal failure (ARF) ([facility])  Active Problems: Moderate malnutrition ([address]  Resolved Problems:    * No resolved hospital problems. *      Active Issues    Assessment & Plan:     1. Acute Renal Failure  - etiology unclear; likely multifactorial  - improving  - Cr down from 6.4 to 3.5  - hyperkalemia and metabolic acidosis resolved  - continue current mgt per Nephrology    2. Acute Metabolic Encephalopathy  - clinically much improved  - continue to address underlying causes    3. New-onset AF  - paroxysmal; has stayed in NSR s/p conversion with Metoprolol IV  - will monitor closely    4. E. coli UTI  - continue Ceftriaxone     5.  Continue supportive measures and other current management  - PT/OT    Electronically signed by Elke Dan MD on 5/21/2019 at 12:01 PM    Attending Physician

## 2019-05-22 LAB
ANION GAP SERPL CALCULATED.3IONS-SCNC: 14 MEQ/L (ref 8–16)
BASOPHILS # BLD: 0.4 %
BASOPHILS ABSOLUTE: 0 THOU/MM3 (ref 0–0.1)
BUN BLDV-MCNC: 64 MG/DL (ref 7–22)
CALCIUM SERPL-MCNC: 7.7 MG/DL (ref 8.5–10.5)
CHLORIDE BLD-SCNC: 105 MEQ/L (ref 98–111)
CO2: 19 MEQ/L (ref 23–33)
CREAT SERPL-MCNC: 3 MG/DL (ref 0.4–1.2)
EOSINOPHIL # BLD: 2.2 %
EOSINOPHILS ABSOLUTE: 0.2 THOU/MM3 (ref 0–0.4)
ERYTHROCYTE [DISTWIDTH] IN BLOOD BY AUTOMATED COUNT: 15.8 % (ref 11.5–14.5)
ERYTHROCYTE [DISTWIDTH] IN BLOOD BY AUTOMATED COUNT: 47.9 FL (ref 35–45)
GFR SERPL CREATININE-BSD FRML MDRD: 15 ML/MIN/1.73M2
GLUCOSE BLD-MCNC: 87 MG/DL (ref 70–108)
HCT VFR BLD CALC: 28.5 % (ref 37–47)
HEMOGLOBIN: 9.3 GM/DL (ref 12–16)
IMMATURE GRANS (ABS): 0.09 THOU/MM3 (ref 0–0.07)
IMMATURE GRANULOCYTES: 1.1 %
LYMPHOCYTES # BLD: 20.7 %
LYMPHOCYTES ABSOLUTE: 1.7 THOU/MM3 (ref 1–4.8)
MAGNESIUM: 2 MG/DL (ref 1.6–2.4)
MCH RBC QN AUTO: 27.4 PG (ref 26–33)
MCHC RBC AUTO-ENTMCNC: 32.6 GM/DL (ref 32.2–35.5)
MCV RBC AUTO: 83.8 FL (ref 81–99)
MONOCYTES # BLD: 8.7 %
MONOCYTES ABSOLUTE: 0.7 THOU/MM3 (ref 0.4–1.3)
NUCLEATED RED BLOOD CELLS: 0 /100 WBC
PLATELET # BLD: 220 THOU/MM3 (ref 130–400)
PMV BLD AUTO: 10.2 FL (ref 9.4–12.4)
POTASSIUM SERPL-SCNC: 4.2 MEQ/L (ref 3.5–5.2)
RBC # BLD: 3.4 MILL/MM3 (ref 4.2–5.4)
SEG NEUTROPHILS: 66.9 %
SEGMENTED NEUTROPHILS ABSOLUTE COUNT: 5.6 THOU/MM3 (ref 1.8–7.7)
SODIUM BLD-SCNC: 138 MEQ/L (ref 135–145)
WBC # BLD: 8.3 THOU/MM3 (ref 4.8–10.8)

## 2019-05-22 PROCEDURE — 6360000002 HC RX W HCPCS: Performed by: INTERNAL MEDICINE

## 2019-05-22 PROCEDURE — 2580000003 HC RX 258: Performed by: INTERNAL MEDICINE

## 2019-05-22 PROCEDURE — 94640 AIRWAY INHALATION TREATMENT: CPT

## 2019-05-22 PROCEDURE — 85025 COMPLETE CBC W/AUTO DIFF WBC: CPT

## 2019-05-22 PROCEDURE — 6370000000 HC RX 637 (ALT 250 FOR IP): Performed by: INTERNAL MEDICINE

## 2019-05-22 PROCEDURE — 83735 ASSAY OF MAGNESIUM: CPT

## 2019-05-22 PROCEDURE — 92526 ORAL FUNCTION THERAPY: CPT

## 2019-05-22 PROCEDURE — 36415 COLL VENOUS BLD VENIPUNCTURE: CPT

## 2019-05-22 PROCEDURE — 80048 BASIC METABOLIC PNL TOTAL CA: CPT

## 2019-05-22 PROCEDURE — 1200000003 HC TELEMETRY R&B

## 2019-05-22 PROCEDURE — 99232 SBSQ HOSP IP/OBS MODERATE 35: CPT | Performed by: INTERNAL MEDICINE

## 2019-05-22 RX ORDER — METRONIDAZOLE 250 MG/1
250 TABLET ORAL EVERY 6 HOURS SCHEDULED
Status: DISCONTINUED | OUTPATIENT
Start: 2019-05-22 | End: 2019-05-23

## 2019-05-22 RX ORDER — SODIUM BICARBONATE 650 MG/1
650 TABLET ORAL 3 TIMES DAILY
Status: DISCONTINUED | OUTPATIENT
Start: 2019-05-22 | End: 2019-05-24 | Stop reason: HOSPADM

## 2019-05-22 RX ADMIN — SODIUM BICARBONATE 650 MG: 650 TABLET ORAL at 13:48

## 2019-05-22 RX ADMIN — IPRATROPIUM BROMIDE AND ALBUTEROL SULFATE 1 AMPULE: .5; 3 SOLUTION RESPIRATORY (INHALATION) at 14:05

## 2019-05-22 RX ADMIN — SODIUM CHLORIDE: 4.5 INJECTION, SOLUTION INTRAVENOUS at 19:32

## 2019-05-22 RX ADMIN — DONEPEZIL HYDROCHLORIDE 20 MG: 10 TABLET, FILM COATED ORAL at 09:30

## 2019-05-22 RX ADMIN — HEPARIN SODIUM 5000 UNITS: 5000 INJECTION INTRAVENOUS; SUBCUTANEOUS at 04:44

## 2019-05-22 RX ADMIN — Medication 10 ML: at 09:30

## 2019-05-22 RX ADMIN — METRONIDAZOLE 250 MG: 250 TABLET ORAL at 17:31

## 2019-05-22 RX ADMIN — IPRATROPIUM BROMIDE AND ALBUTEROL SULFATE 1 AMPULE: .5; 3 SOLUTION RESPIRATORY (INHALATION) at 21:18

## 2019-05-22 RX ADMIN — IPRATROPIUM BROMIDE AND ALBUTEROL SULFATE 1 AMPULE: .5; 3 SOLUTION RESPIRATORY (INHALATION) at 17:09

## 2019-05-22 RX ADMIN — VENLAFAXINE 37.5 MG: 37.5 TABLET ORAL at 09:30

## 2019-05-22 RX ADMIN — SODIUM CHLORIDE: 4.5 INJECTION, SOLUTION INTRAVENOUS at 01:44

## 2019-05-22 RX ADMIN — RISPERIDONE 0.12 MG: 0.25 TABLET ORAL at 09:30

## 2019-05-22 RX ADMIN — CEFTRIAXONE SODIUM 1 G: 1 INJECTION, POWDER, FOR SOLUTION INTRAMUSCULAR; INTRAVENOUS at 13:48

## 2019-05-22 RX ADMIN — IPRATROPIUM BROMIDE AND ALBUTEROL SULFATE 1 AMPULE: .5; 3 SOLUTION RESPIRATORY (INHALATION) at 10:32

## 2019-05-22 RX ADMIN — ATENOLOL 25 MG: 25 TABLET ORAL at 09:30

## 2019-05-22 ASSESSMENT — PAIN SCALES - GENERAL: PAINLEVEL_OUTOF10: 0

## 2019-05-22 NOTE — PROGRESS NOTES
Nutrition Assessment    Type and Reason for Visit: Reassess    Nutrition Recommendations:   Continue current diet, further diet recommendations per SLP. ONS modified, Ensure Clear BID, Nepro daily, and Magic Cup TID. Recommend consider renal multivitamin, folbee plus. Nutrition Assessment:   Pt improving from a nutritional standpoint AEB diet initiation and reports per daughter she has been eating more than prior to admit. Remains at risk for further nutritional compromise r/t waxing/waning alertness, oral intake 50% or less of meals, recent cdiff and UTI, and need for continued nutrition support. Will modify ONS, provide Ensure Clear BID,Nepro daily, and Magic Cup TID. Will recommend consider renal multivitamin. Encouraged oral intake. Malnutrition Assessment:  · Malnutrition Status: Meets the criteria for moderate malnutrition  · Context: Acute illness or injury  · Findings of the 6 clinical characteristics of malnutrition (Minimum of 2 out of 6 clinical characteristics is required to make the diagnosis of moderate or severe Protein Calorie Malnutrition based on AND/ASPEN Guidelines):  1. Energy Intake-Less than or equal to 50% of estimated energy requirement, Greater than or equal to 5 days    2. Fat Loss-Mild subcutaneous fat loss, Orbital  3. Muscle Loss-Mild muscle mass loss, Temples (temporalis muscle)    Nutrition Risk Level: High    Nutrient Needs:  · Estimated Daily Total Kcal: ~ 8246-9089 kcals (15-18 kcals/kg/day based on 97 kg)  · Estimated Daily Protein (g): 38-48 grams (0.8-1 gram/kg/day based on 48 kg IBW) increase as renal status allows    Nutrition Diagnosis:   · Problem:  Moderate malnutrition, In context of acute illness or injury  · Etiology: related to Insufficient energy/nutrient consumption, Diarrhea, Cognitive or neurological impairment     Signs and symptoms:  as evidenced by Diet history of poor intake, Mild muscle loss, Mild loss of subcutaneous fat    Objective Information:  · Nutrition-Focused Physical Findings: Cdiff, 6 stools in the last 24 hours. Ecoli-UTI, russell inserted. Hx; dementia, resides at [facility]. Waxing and waning alertness. SLP following -regular with thin texture only when alert. BUN 64, Creatinine 3, Potassium 4.2. Medications: sodium bicarbonate, aricept, insulin, rocephin, IVF's at 60 ml/hr. · Wound Type: (groin MASD)  · Current Nutrition Therapies:  · Oral Diet Orders: General   · Oral Diet intake: 0%, 1-25%, 26-50%  · Oral Nutrition Supplement (ONS) Orders: Clear Liquid Oral Supplement, Renal Oral Supplement, Frozen Oral Supplement(Ensure Clear BID, Nepro daily, Magic Cup TID)  · ONS intake: 26-50%  · Anthropometric Measures:  · Ht: 5' 1\" (154.9 cm)   · Current Body Wt: 215 lb 12.8 oz (97.9 kg)(5/22/19 trace LUE, trace LE edema)  · Admission Body Wt: 212 lb 14.4 oz (96.6 kg)(5/17/19 trace LE edema)  · Usual Body Wt: (~ 219# per daughter. Per ECF records: 3/19/19: 211#, 3/28/19: 215.8#, 4/5/19: 219. 2#)  · % Weight Change:  ,  2.9% loss in 1 month  · Ideal Body Wt: 105 lb (47.6 kg),   · BMI Classification: BMI > or equal to 40.0 Obese Class III    Nutrition Interventions:   Continue current diet, Modify current diet  Continued Inpatient Monitoring, Education Initiated, Coordination of Care(Encouraged oral intake and ONS with pt and her daughter.)    Nutrition Evaluation:   · Evaluation: Goals set   · Goals: Pt will safely consume 75% or more of meals during LOS.     · Monitoring: Meal Intake, Supplement Intake, Diet Tolerance, Skin Integrity, Mental Status/Confusion, Weight, Pertinent Labs, Chewing/Swallowing, Diarrhea      Electronically signed by Scottie Hedrick RD, LD on 5/22/19 at 3:09 PM    Contact Number: [phone]

## 2019-05-22 NOTE — PROGRESS NOTES
Dr. Ha Anand Progress note       Internal Medicine              Patient:  Lea Woods  YOB: 1940    MRN: [id number]   Acct:  [id number]   [id number]  Primary Care Physician: Yohan Burgos MD    Admit Date: 5/16/2019           Subjective: patient seen with her daughter at the bed side. She tells me that the c diff treatment has NOT been completed. Objective:      Physical Exam:    Vitals:  Patient Vitals for the past 24 hrs:   BP Temp Temp src Pulse Resp SpO2 Weight   05/22/19 1500 (!) 123/47 99.5 °F (37.5 °C) Axillary 69 18 94 % --   05/22/19 1100 (!) 125/54 99 °F (37.2 °C) Axillary 64 18 90 % --   05/22/19 0923 (!) 119/53 98.1 °F (36.7 °C) Oral 62 20 92 % --   05/22/19 0327 -- -- -- -- -- -- 215 lb 12.8 oz (97.9 kg)   05/22/19 0307 (!) 108/48 99.5 °F (37.5 °C) Axillary 62 16 93 % --   05/21/19 2308 (!) 106/53 99.1 °F (37.3 °C) Axillary 73 18 92 % --   05/21/19 2124 (!) 116/57 98.7 °F (37.1 °C) Axillary 74 18 95 % --     Weight: Weight: 215 lb 12.8 oz (97.9 kg)     24 hour intake/output:    Intake/Output Summary (Last 24 hours) at 5/22/2019 1607  Last data filed at 5/22/2019 1500  Gross per 24 hour   Intake 1981.67 ml   Output 975 ml   Net 1006.67 ml       General appearance - alert, and in no distress  Eyes - pupils equal and reactive, extraocular eye movements intact  Mouth - mucous membranes moist, pharynx normal without lesions  Neck - supple, no significant adenopathy  Chest - clear to auscultation, no wheezes, rales or rhonchi, symmetric air entry  Heart - normal rate, regular rhythm, normal S1, S2, no murmurs, rubs, clicks or gallops  Abdomen - soft, nontender, nondistended, no masses or organomegaly, pos bs. Neurological - alert, and responsive.   Musculoskeletal - no joint tenderness, deformity or swelling  Extremities - peripheral pulses normal, no pedal edema, no clubbing or cyanosis  Skin - normal coloration and turgor, no rashes, no suspicious skin lesions noted    Review of Labs and Diagnostic Testing:    CBC:   Recent Labs     05/22/19  0803   WBC 8.3   HGB 9.3*   HCT 28.5*   MCV 83.8        BMP:   Recent Labs     05/22/19  0803      K 4.2      CO2 19*   BUN 64*   CREATININE 3.0*   CALCIUM 7.7*   GLUCOSE 87     PT/INR: No results for input(s): PROTIME, INR in the last 72 hours. APTT: No results for input(s): APTT in the last 72 hours. Lipids: No results for input(s): ALKPHOS, ALT, AST, BILITOT, BILIDIR, LABALBU, AMYLASE, LIPASE in the last 72 hours. Troponin: No results for input(s): TROPONINI in the last 72 hours. Imaging:  Us Renal Complete    Result Date: 5/17/2019  PROCEDURE: US RENAL COMPLETE CLINICAL INFORMATION: RENAL FAILURE, ACUTE (KIDNEY INJURY). COMPARISON: Abdomen pelvis CT with contrast dated 9/22/2017 TECHNIQUE:Real-time and color flow ultrasound of the kidneys and bladder were performed. FINDINGS: Kidneys: Left kidney was very poorly visualized. Morphology: Normal echogenicity. Mass/cyst: none visualized Hydronephrosis: There is no right hydronephrosis. There is no definite left hydronephrosis. Calculi: none visualized RIGHT KIDNEY - 10.5 x 4.9 x 4.7 cm Resistive Index - 0.80 Cortical Thickness - 1.2 cm LEFT KIDNEY - 10.1 x 4.2 x 3.7 cm Resistive Index - 0.80 Cortical Thickness - 1.2 cm URINARY BLADDER: No bladder wall thickening or obvious mass. Bilateral urine bladder jets were visualized. There are low level internal echoes in the dependent portion of the bladder which may represent debris Pre-Void - 366 mL Post-Void - pt unable to void     Suboptimal visualization of the left kidney. No hydronephrosis. Elevated renal resistive indices bilaterally. Possible debris layering in the urinary bladder. Patient unable to void. Bladder volume 366 cc. **This report has been created using voice recognition software. It may contain minor errors which are inherent in voice recognition technology. ** Final report electronically signed by Dr. Chely Priest on 5/17/2019 6:16 AM    Xr Chest Portable    Result Date: 5/17/2019  PROCEDURE: XR CHEST PORTABLE CLINICAL INFORMATION: Aspiration. COMPARISON: Chest x-ray dated 9/25/2017 TECHNIQUE: AP Portable chest xray FINDINGS: Lungs/pleura:  No pneumonia, pulmonary edema, or obvious mass. There is minimal mid right lung atelectasis. No pleural effusion. No pneumothorax. Heart: There is mild cardiomegaly. Mediastinum/gely: No obvious mass or adenopathy. Skeleton: No significant bone or joint abnormality. Lines/tubes/devices: none     No acute pneumonia. Minimal mid right lung atelectasis. Mild cardiomegaly. **This report has been created using voice recognition software. It may contain minor errors which are inherent in voice recognition technology. ** Final report electronically signed by Dr. Chely Priest on 5/17/2019 12:55 AM    Xr Abdomen For Ng/og/ne Tube Placement    Result Date: 5/17/2019  PROCEDURE: XR ABDOMEN FOR NG/OG/NE TUBE PLACEMENT CLINICAL INFORMATION: Confirmation of course of NG/OG/NE tube and location of tip of tube . Repositioned. COMPARISON: Chest x-ray 5/17/2019. This film was taken at 1305. TECHNIQUE: A portable AP supine view of the abdomen was obtained. FINDINGS: There is an esophageal route tube in place. The tip is in the distal third of the esophagus. The tube is now looped in the esophagus. No distended or dilated bowel loops are noted. The lung bases are clear. 1. Esophageal route tube looped in the esophagus. The tip is in the distal third of the esophagus. The entire tube should be removed. These findings were telephoned to [facility], the patient's nurse at 2:33 PM on 5/17/2019 . **This report has been created using voice recognition software. It may contain minor errors which are inherent in voice recognition technology. ** Final report electronically signed by Dr. Carlitos Ramirez on 5/17/2019 2:34 PM    Xr Abdomen For Ng/og/ne Tube Placement    Result Date: 5/17/2019  PROCEDURE: XR ABDOMEN FOR NG/OG/NE TUBE PLACEMENT CLINICAL INFORMATION: Confirmation of course of NG/OG/NE tube and location of tip of tube. COMPARISON: No prior study. TECHNIQUE: A supine AP view of the abdomen was obtained. FINDINGS: Limited study. NG tube is now in the distal third of the esophagus. Recommend advancement at least 6 to 8 cm. NG tube is now in the distal third of the esophagus. Recommend advancement at least 6 to 8 cm. **This report has been created using voice recognition software. It may contain minor errors which are inherent in voice recognition technology. ** Final report electronically signed by Dr. Brie Esposito on 5/17/2019 1:29 PM    Xr Abdomen For Ng/og/ne Tube Placement    Result Date: 5/17/2019  PROCEDURE: XR ABDOMEN FOR NG/OG/NE TUBE PLACEMENT CLINICAL INFORMATION: NG placement. COMPARISON: No prior study. TECHNIQUE: A supine AP view of the abdomen was obtained. FINDINGS: NG tube is terminates in the distal esophagus. Recommend advancement. Nonspecific bowel gas pattern. Surgical clips in the right upper quadrant. Thoracolumbar scoliosis and degenerative changes. NG tube is terminates in the distal esophagus. Recommend advancement. **This report has been created using voice recognition software. It may contain minor errors which are inherent in voice recognition technology. ** Final report electronically signed by Dr. Brie Esposito on 5/17/2019 12:38 PM      EKG:      Diet: DIET GENERAL;  Dietary Nutrition Supplements: Clear Liquid Oral Supplement  Dietary Nutrition Supplements: Renal Oral Supplement  Dietary Nutrition Supplements: Frozen Oral Supplement        Data:   Scheduled Meds: Scheduled Meds:   sodium bicarbonate  650 mg Oral TID    cefTRIAXone (ROCEPHIN) IV  1 g Intravenous Q24H    risperiDONE  0.125 mg Oral QAM    risperiDONE  0.25 mg Oral Nightly    venlafaxine  37.5 mg Oral BID    insulin regular  5 Units Intravenous Once    dextrose  25 g Intravenous Once    atenolol  25 mg Oral Daily    donepezil  20 mg Oral Daily    sodium chloride flush  10 mL Intravenous 2 times per day    docusate sodium  100 mg Oral BID    heparin (porcine)  5,000 Units Subcutaneous 3 times per day    ipratropium-albuterol  1 ampule Inhalation Q4H WA     Continuous Infusions:   sodium chloride 60 mL/hr at 05/22/19 0928    dextrose       PRN Meds:.midodrine, glucose, dextrose, glucagon (rDNA), dextrose, sodium chloride flush, ondansetron, acetaminophen  Continuous Infusions:   sodium chloride 60 mL/hr at 05/22/19 0928    dextrose           Assessment   Principal Problem:    Acute renal failure (ARF) (HCC)  Active Problems: Moderate malnutrition ([address]  Resolved Problems:    * No resolved hospital problems. *  morbid obesity      Patient Active Problem List   Diagnosis    Dementia    Hiatus hernia with compression intrathoracic    Chest pain    Spinal stenosis at L4-L5 level  with left leg weakness and urinary retention    Weakness of left lower extremity  spinal stenosis  or cva    Urinary retention  neurogenic bladder    Alzheimer's dementia with behavioral disturbance    Aspiration pneumonia (HCC)  bilateral  from large hiatus hernia    Fever    Esophagitis    Dysmotility of stomach    Metabolic encephalopathy    Acute renal failure (ARF) (HCC)    Moderate malnutrition ([address]        Plan   Not certain about the completion of the treatment for the c diff from the ecf.  Will restart the flagyl orally and repeat the test  Cont rocephin for the uti  Pt/ot      Electronically signed by Michel Russ MD on 5/22/2019 at 4:07 PM

## 2019-05-22 NOTE — PROGRESS NOTES
[address] [address]  INPATIENT SPEECH THERAPY  [facility] RENAL TELEMETRY 6K    TIME   SLP Individual Minutes  Time In: 08  Time Out: 4549  Minutes: 8  Timed Code Treatment Minutes: 0 Minutes       []Daily Note  []Progress Note  [x]Discharge Note    Date: 2019  Patient Name: Reginia Bernheim        MRN: [id number]    : 1940  (77 y.o.)  Gender: female   Primary Provider: Gerry Glass MD  Admitting Diagnosis:  Acute renal failure  Secondary Diagnosis: dysphagia  Precautions: aspiration  Swallowing Status/Diet: regular and thin  Swallowing Strategies: feed when alert, upright positioning  DATE of last MBS:  BSE 19  Pain:  Did not state    Subjective: Pt seen at bedside. Alert and confused at baseline. Spouse present. SHORT TERM GOAL #1:  Goal 1: Pt will tolerate regular and thin diet without s/s of aspiration to ensure safe and adequate nutrition and hydration. GOAL MET  INTERVENTIONS:Diet analysis via jaren cracker and thin liquids by straw. Functional mastication and oral bolus formation with jaren cracker with no s/s of aspiration with solids or thin liquids by straw. Recommend pt continue on regular and thin diet at this time.          ASSESSMENT:  Assessment: [x]Progressing towards goals          []Not Progressing towards goals       Patient Tolerance of Treatment:   [x]Tolerated well []Tolerated fair []Required rest breaks []Fatigued  Education:  Learner:  [x]Patient          [x]Significant Other          []Other  Education provided regarding:  [x]Goals and POC   []Diet and swallowing precautions    []Home Exercise Program  []Progress and/or discharge information  Method of Education:  [x]Discussion          []Demonstration          []Handout          []Other  Evaluation of Education:   [x]Verbalized understanding: spouse  []Demonstrates without assistance  []Demonstrates with assistance  []Needs further instruction     [x]No evidence of learning: patient                 []No family present      Plan: []Continue with current plan of care    []Modify current plan of care as follows:    [x]Discharge patient    Discharge Location:[facility]    Services/Supervision Recommended: Sue Anderson M.S. YARIELLEB-ROSA/[id number]

## 2019-05-22 NOTE — PROGRESS NOTES
Renal Progress Note  [facility]    Patient :  Candie Girard; 77 y.o. MRN# [id number]  Location:  [address] [place] [address]  Attending:  Herb Elliott MD  Admit Date:  5/16/2019   Hospital Day: 6      Subjective:     Nephrology is following the patient for KUMAR, electrolyte abnormalities. Patient seen and examined earlier this morning. No new complaints. Still have frequent stools but per RN are becoming more formed.  at bedside. Outpatient Medications:     Medications Prior to Admission: albuterol (PROVENTIL) (5 MG/ML) 0.5% nebulizer solution, Take 2.5 mg by nebulization every 6 hours  LORazepam (ATIVAN) 0.5 MG tablet, Take 0.5 mg by mouth 3 times daily as needed for Anxiety. sucralfate (CARAFATE) 1 GM/10ML suspension, Take 1 g by mouth 3 times daily (before meals)  hydrocortisone 1 % lotion, Apply topically 3 times daily as needed (to bilateral arms)  risperiDONE (RISPERDAL) 0.25 MG tablet, Take 0.125 mg by mouth every morning  risperiDONE (RISPERDAL) 0.25 MG tablet, Take 0.25 mg by mouth nightly  Dextromethorphan-guaiFENesin (ROBITUSSIN COUGH+CHEST BOYD DM) 5-100 MG/5ML LIQD, Take 5 mLs by mouth every 4 hours as needed (cough)  venlafaxine (EFFEXOR) 37.5 MG tablet, Take 37.5 mg by mouth 2 times daily  acetaminophen (TYLENOL) 325 MG tablet, Take 650 mg by mouth every 4 hours as needed for Pain   LORazepam (ATIVAN) 0.5 MG tablet, Take 0.5 mg by mouth 2 times daily.    divalproex (DEPAKOTE SPRINKLE) 125 MG capsule, Take 125 mg by mouth 2 times daily  hydrALAZINE (APRESOLINE) 10 MG tablet, Take 10 mg by mouth 3 times daily  losartan-hydrochlorothiazide (HYZAAR) 100-12.5 MG per tablet, Take 1 tablet by mouth daily  omeprazole (PRILOSEC) 40 MG delayed release capsule, Take 40 mg by mouth daily  ranitidine (ZANTAC) 15 MG/ML syrup, Take 150 mg by mouth 2 times daily   atenolol (TENORMIN) 50 MG tablet, Take 1 tablet by mouth daily Hold for systolic blood pressure less than 110, heart rate less than 60  atorvastatin (LIPITOR) 40 MG tablet, Take 40 mg by mouth daily  donepezil (ARICEPT) 10 MG tablet, Take 20 mg by mouth daily   [] metroNIDAZOLE (FLAGYL) 500 MG tablet, Take 500 mg by mouth 3 times daily  [DISCONTINUED] furosemide (LASIX) 20 MG tablet, Take 20 mg by mouth 2 times daily  [DISCONTINUED] potassium chloride (KLOR-CON) 20 MEQ packet, Take 20 mEq by mouth 2 times daily  [DISCONTINUED] ALPRAZolam (XANAX) 0.5 MG tablet, Take 1 tablet by mouth every 8 hours as needed for Anxiety  [DISCONTINUED] ferrous gluconate (FERGON) 240 (27 Fe) MG tablet, Take 1.5 tablets by mouth 3 times daily (with meals)  [DISCONTINUED] pantoprazole (PROTONIX) 40 MG tablet, Take 1 tablet by mouth 2 times daily (before meals)  [DISCONTINUED] loperamide (IMODIUM) 2 MG capsule, Take 2 mg by mouth nightly. Current Medications:     Scheduled Meds:    sodium bicarbonate  650 mg Oral TID    cefTRIAXone (ROCEPHIN) IV  1 g Intravenous Q24H    risperiDONE  0.125 mg Oral QAM    risperiDONE  0.25 mg Oral Nightly    venlafaxine  37.5 mg Oral BID    insulin regular  5 Units Intravenous Once    dextrose  25 g Intravenous Once    atenolol  25 mg Oral Daily    donepezil  20 mg Oral Daily    sodium chloride flush  10 mL Intravenous 2 times per day    docusate sodium  100 mg Oral BID    heparin (porcine)  5,000 Units Subcutaneous 3 times per day    ipratropium-albuterol  1 ampule Inhalation Q4H WA     Continuous Infusions:    sodium chloride 60 mL/hr at 19 0928    dextrose       PRN Meds:  midodrine, glucose, dextrose, glucagon (rDNA), dextrose, sodium chloride flush, ondansetron, acetaminophen    Input/Output:       I/O last 3 completed shifts: In: 3432.7 [P.O.:420; I.V.:3012.7]  Out: 950 [Urine:950].       Patient Vitals for the past 96 hrs (Last 3 readings):   Weight   19 0327 215 lb 12.8 oz (97.9 kg)   19 0445 213 lb 9.6 oz (96.9 kg)       Vital Signs:   Temperature:  Temp: 98.1 °F (36.7 °C)  TMax: Temp (24hrs), Av.5 °F (36.9 °C), Min:97.7 °F (36.5 °C), Max:99.5 °F (37.5 °C)    Respirations:  Resp: 20  Pulse:   Pulse: 62  BP:    BP: (!) 119/53  BP Range: Systolic (11IDJ), MJA:849 , Min:106 , ZUS:535       Diastolic (58KKI), WQO:10, Min:40, Max:57      Physical Examination:     General:  Awake, no acute distress, demented  HEENT: NC/AT/ MMM  Chest:               Clear anteriorly  Cardiac:  S1, S2  Abdomen: Soft, non-tender,  Neuro:  No facial droop, No Asterixis  SKIN:  No rashes, good skin turgor. Extremities:  Trace -1+ LE edema    Labs:       Recent Labs     19  0803   WBC 8.3   RBC 3.40*   HGB 9.3*   HCT 28.5*   MCV 83.8   MCH 27.4   MCHC 32.6      MPV 10.2      BMP:   Recent Labs     19  0342 19  1433 19  0719 19  2307 19  0803    143 140  --  138   K 3.4* 3.6 3.5 3.9 4.2    104 103  --  105   CO2 25 24 23  --  19*   BUN 91*  --  76*  --  64*   CREATININE 4.3*  --  3.5*  --  3.0*   GLUCOSE 121*  --  83  --  87   CALCIUM 7.7*  --  8.0*  --  7.7*      Phosphorus:   No results for input(s): PHOS in the last 72 hours. Magnesium:    Recent Labs     19  1815 19  0342 19  0803   MG 2.2 2.1 2.0     Albumin:  No results for input(s): LABALBU in the last 72 hours.   BNP:    No results found for: BNP  DIEGO:    No results found for: DIEGO  SPEP:  Lab Results   Component Value Date    PROT 5.9 2017     UPEP:   No results found for: LABPE  C3:   No results found for: C3  C4:   No results found for: C4  MPO ANCA:   No results found for: MPO  PR3 ANCA:   No results found for: PR3  Anti-GBM:   No results found for: GBMABIGG  Hep BsAg:       No results found for: HEPBSAG  Hep C AB:        No results found for: HEPCAB    Urinalysis/Chemistries:      Lab Results   Component Value Date    NITRU POSITIVE 2019    COLORU DARK YELLOW 2019    PHUR 5.0 2019    LABCAST 4-8 HYALINE 2017    LABCAST NONE SEEN 2017    WBCUA > 200 05/16/2019    RBCUA 25-50 05/16/2019    MUCUS NONE SEEN 05/16/2019    YEAST NONE SEEN 05/16/2019    BACTERIA NONE 05/16/2019    SPECGRAV 1.028 09/22/2017    LEUKOCYTESUR LARGE 05/16/2019    UROBILINOGEN 0.2 05/16/2019    BILIRUBINUR SMALL 05/16/2019    BLOODU SMALL 05/16/2019    GLUCOSEU NEGATIVE 05/16/2019    KETUA TRACE 05/16/2019    AMORPHOUS DEBRIS 05/16/2019     Urine Sodium:   No results found for: ABIGAIL  Urine Potassium:  No results found for: KUR  Urine Chloride:  No results found for: CLUR  Urine Osmolarity:   Lab Results   Component Value Date    OSMOU 376 05/17/2019     Urine Protein:   No components found for: TOTALPROTEIN, URINE   Urine Creatinine:     Lab Results   Component Value Date    LABCREA 98.3 05/17/2019     Urine Eosinophils:  No components found for: UEOS        Impression and Plan:  1. KUMAR due to severe volume depletion + meds - improving, she is having some edema so will decrease IV fluids to 60 cc/hour  2. Metabolic acidosis - start po sodium bicarbonate  3. Hypernatremia -resolved  4. Metabolic encephalopathy - improved  5. New onset Afib,started on Atenolol  6. C. Diff  - off Flagyl  7. E. Coli UTI  8. Dementia            Please don't hesitate to call with any questions.   Electronically signed by Gary Mejia DO on 5/22/2019 at 9:36 AM

## 2019-05-22 NOTE — PLAN OF CARE
Problem: Falls - Risk of:  Goal: Will remain free from falls  Description  Will remain free from falls  5/22/2019 0117 by Evan Gomez RN  Outcome: Ongoing   Discussed use of alarms and hourly rounding with patient. Belongings within reach including call light. Turning every 2 hours with pillow support. Family in room. Problem: Risk for Impaired Skin Integrity  Goal: Tissue integrity - skin and mucous membranes  Description  Structural intactness and normal physiological function of skin and  mucous membranes. 5/22/2019 0117 by Evan Gomez RN  Outcome: Ongoing   Turning every 2 hours prn. Buttock and abad area assessed with turns due to increased BM, cream applied as needed. Pillow support for heels  Problem: Discharge Planning:  Goal: Discharged to appropriate level of care  Description  Discharged to appropriate level of care  5/22/2019 0117 by Evan Gomez RN  Outcome: Ongoing   Probable return to [facility]. Continue to assess discharge needs as they arise. Problem: Pain:  Goal: Pain level will decrease  Description  Pain level will decrease  5/22/2019 0117 by Evan Gomez RN  Outcome: Ongoing   Denies pain this shift. Appears to rest comfortably with pillow support. Problem: Bowel/Gastric:  Goal: Occurrences of diarrhea will decrease  Description  Occurrences of diarrhea will decrease  5/22/2019 0117 by Evan Gomez RN  Outcome: Ongoing   BM monitored, antibiotics as ordered. Problem: Tissue Perfusion - Renal, Altered:  Goal: Electrolytes within specified parameters  Description  Electrolytes within specified parameters  Outcome: Ongoing   Monitored with ordered testing, replacement of electrolytes per order. Problem: Tissue Perfusion - Renal, Altered:  Goal: Serum creatinine will be within specified parameters  Description  Serum creatinine will be within specified parameters  Outcome: Ongoing   Nephrology following patient. IV hydration.    Problem: Tissue Perfusion - Renal, Altered:  Goal: Ability to achieve a balanced intake and output will improve  Description  Ability to achieve a balanced intake and output will improve  Outcome: Ongoing   I&O monitored closely. Nowak inserted due to retention. Problem: Nutrition  Goal: Optimal nutrition therapy  Outcome: Ongoing   Encouraged food patient likes. Family brings some food from home. Care plan reviewed with patient and . Patient and  verbalize understanding of the plan of care and contribute to goal setting.

## 2019-05-22 NOTE — CARE COORDINATION
Discharge planning: Nowak catheter inserted due to retention, strict stool counts with description each shift, continue contact + isolation precautions, SLP follows, neuro checks, PT and OT following, Tmax 99.5, IVF, Rocephin IV conts, accu checks as ordered, duo nebs continue, HCO3 tablets, Flagyl IV stopped, K+ replacement. Plans return to [facility] when medically stable.

## 2019-05-22 NOTE — PLAN OF CARE
Problem: Nutrition  Goal: Optimal nutrition therapy  5/22/2019 1509 by Grupo Williamson RD, LD  Outcome: Ongoing   Nutrition Problem: Moderate malnutrition, In context of acute illness or injury  Intervention: Food and/or Nutrient Delivery: Continue current diet, Modify current diet  Nutritional Goals: Pt will safely consume 75% or more of meals during LOS.

## 2019-05-23 LAB
ANION GAP SERPL CALCULATED.3IONS-SCNC: 10 MEQ/L (ref 8–16)
BUN BLDV-MCNC: 54 MG/DL (ref 7–22)
CALCIUM IONIZED: 0.95 MMOL/L (ref 1.12–1.32)
CALCIUM SERPL-MCNC: 8 MG/DL (ref 8.5–10.5)
CHLORIDE BLD-SCNC: 108 MEQ/L (ref 98–111)
CO2: 22 MEQ/L (ref 23–33)
CREAT SERPL-MCNC: 2.9 MG/DL (ref 0.4–1.2)
ERYTHROCYTE [DISTWIDTH] IN BLOOD BY AUTOMATED COUNT: 15.9 % (ref 11.5–14.5)
ERYTHROCYTE [DISTWIDTH] IN BLOOD BY AUTOMATED COUNT: 49.5 FL (ref 35–45)
FERRITIN: 383 NG/ML (ref 10–291)
GFR SERPL CREATININE-BSD FRML MDRD: 16 ML/MIN/1.73M2
GLUCOSE BLD-MCNC: 99 MG/DL (ref 70–108)
HCT VFR BLD CALC: 28.5 % (ref 37–47)
HEMOGLOBIN: 9.5 GM/DL (ref 12–16)
IRON SATURATION: 37 % (ref 20–50)
IRON: 44 UG/DL (ref 50–170)
MAGNESIUM: 2 MG/DL (ref 1.6–2.4)
MCH RBC QN AUTO: 28.5 PG (ref 26–33)
MCHC RBC AUTO-ENTMCNC: 33.3 GM/DL (ref 32.2–35.5)
MCV RBC AUTO: 85.6 FL (ref 81–99)
PLATELET # BLD: 227 THOU/MM3 (ref 130–400)
PMV BLD AUTO: 10.4 FL (ref 9.4–12.4)
POTASSIUM SERPL-SCNC: 3.8 MEQ/L (ref 3.5–5.2)
RBC # BLD: 3.33 MILL/MM3 (ref 4.2–5.4)
REASON FOR REJECTION: NORMAL
REJECTED TEST: NORMAL
SODIUM BLD-SCNC: 140 MEQ/L (ref 135–145)
TOTAL IRON BINDING CAPACITY: 119 UG/DL (ref 171–450)
VITAMIN D 25-HYDROXY: 19 NG/ML (ref 30–100)
WBC # BLD: 5.6 THOU/MM3 (ref 4.8–10.8)

## 2019-05-23 PROCEDURE — 94761 N-INVAS EAR/PLS OXIMETRY MLT: CPT

## 2019-05-23 PROCEDURE — 36415 COLL VENOUS BLD VENIPUNCTURE: CPT

## 2019-05-23 PROCEDURE — 6360000002 HC RX W HCPCS: Performed by: INTERNAL MEDICINE

## 2019-05-23 PROCEDURE — 94640 AIRWAY INHALATION TREATMENT: CPT

## 2019-05-23 PROCEDURE — 6370000000 HC RX 637 (ALT 250 FOR IP): Performed by: INTERNAL MEDICINE

## 2019-05-23 PROCEDURE — 80048 BASIC METABOLIC PNL TOTAL CA: CPT

## 2019-05-23 PROCEDURE — 2700000000 HC OXYGEN THERAPY PER DAY

## 2019-05-23 PROCEDURE — 85027 COMPLETE CBC AUTOMATED: CPT

## 2019-05-23 PROCEDURE — 1200000003 HC TELEMETRY R&B

## 2019-05-23 PROCEDURE — 82728 ASSAY OF FERRITIN: CPT

## 2019-05-23 PROCEDURE — 2500000003 HC RX 250 WO HCPCS: Performed by: INTERNAL MEDICINE

## 2019-05-23 PROCEDURE — 83540 ASSAY OF IRON: CPT

## 2019-05-23 PROCEDURE — 82306 VITAMIN D 25 HYDROXY: CPT

## 2019-05-23 PROCEDURE — 2580000003 HC RX 258: Performed by: INTERNAL MEDICINE

## 2019-05-23 PROCEDURE — 83550 IRON BINDING TEST: CPT

## 2019-05-23 PROCEDURE — 82330 ASSAY OF CALCIUM: CPT

## 2019-05-23 PROCEDURE — 83735 ASSAY OF MAGNESIUM: CPT

## 2019-05-23 PROCEDURE — 99232 SBSQ HOSP IP/OBS MODERATE 35: CPT | Performed by: INTERNAL MEDICINE

## 2019-05-23 RX ADMIN — HEPARIN SODIUM 5000 UNITS: 5000 INJECTION INTRAVENOUS; SUBCUTANEOUS at 06:39

## 2019-05-23 RX ADMIN — Medication 10 ML: at 21:05

## 2019-05-23 RX ADMIN — HEPARIN SODIUM 5000 UNITS: 5000 INJECTION INTRAVENOUS; SUBCUTANEOUS at 00:40

## 2019-05-23 RX ADMIN — VENLAFAXINE 37.5 MG: 37.5 TABLET ORAL at 13:08

## 2019-05-23 RX ADMIN — HEPARIN SODIUM 5000 UNITS: 5000 INJECTION INTRAVENOUS; SUBCUTANEOUS at 20:50

## 2019-05-23 RX ADMIN — METRONIDAZOLE 250 MG: 250 TABLET ORAL at 13:08

## 2019-05-23 RX ADMIN — CEFTRIAXONE SODIUM 1 G: 1 INJECTION, POWDER, FOR SOLUTION INTRAMUSCULAR; INTRAVENOUS at 13:03

## 2019-05-23 RX ADMIN — VENLAFAXINE 37.5 MG: 37.5 TABLET ORAL at 00:48

## 2019-05-23 RX ADMIN — SODIUM BICARBONATE 650 MG: 650 TABLET ORAL at 00:39

## 2019-05-23 RX ADMIN — ATENOLOL 25 MG: 25 TABLET ORAL at 08:54

## 2019-05-23 RX ADMIN — METRONIDAZOLE 500 MG: 500 INJECTION, SOLUTION INTRAVENOUS at 20:51

## 2019-05-23 RX ADMIN — RISPERIDONE 0.25 MG: 0.25 TABLET ORAL at 00:39

## 2019-05-23 RX ADMIN — IPRATROPIUM BROMIDE AND ALBUTEROL SULFATE 1 AMPULE: .5; 3 SOLUTION RESPIRATORY (INHALATION) at 22:47

## 2019-05-23 RX ADMIN — MIDODRINE HYDROCHLORIDE 5 MG: 5 TABLET ORAL at 17:25

## 2019-05-23 RX ADMIN — SODIUM BICARBONATE 650 MG: 650 TABLET ORAL at 13:08

## 2019-05-23 RX ADMIN — IPRATROPIUM BROMIDE AND ALBUTEROL SULFATE 1 AMPULE: .5; 3 SOLUTION RESPIRATORY (INHALATION) at 16:42

## 2019-05-23 RX ADMIN — METRONIDAZOLE 250 MG: 250 TABLET ORAL at 00:40

## 2019-05-23 RX ADMIN — DONEPEZIL HYDROCHLORIDE 20 MG: 10 TABLET, FILM COATED ORAL at 08:51

## 2019-05-23 RX ADMIN — IPRATROPIUM BROMIDE AND ALBUTEROL SULFATE 1 AMPULE: .5; 3 SOLUTION RESPIRATORY (INHALATION) at 11:39

## 2019-05-23 RX ADMIN — SODIUM CHLORIDE: 4.5 INJECTION, SOLUTION INTRAVENOUS at 13:08

## 2019-05-23 RX ADMIN — RISPERIDONE 0.12 MG: 0.25 TABLET ORAL at 08:52

## 2019-05-23 RX ADMIN — METRONIDAZOLE 250 MG: 250 TABLET ORAL at 06:39

## 2019-05-23 RX ADMIN — Medication 10 ML: at 09:00

## 2019-05-23 RX ADMIN — IPRATROPIUM BROMIDE AND ALBUTEROL SULFATE 1 AMPULE: .5; 3 SOLUTION RESPIRATORY (INHALATION) at 05:32

## 2019-05-23 ASSESSMENT — PAIN SCALES - GENERAL: PAINLEVEL_OUTOF10: 0

## 2019-05-23 ASSESSMENT — PAIN SCALES - WONG BAKER
WONGBAKER_NUMERICALRESPONSE: 0
WONGBAKER_NUMERICALRESPONSE: 0

## 2019-05-23 NOTE — PROGRESS NOTES
Dr. Leeann Abad Progress note       Internal Medicine              Patient:  Keesha Tillman  YOB: 1940    MRN: [id number]   Acct:  [id number]   [id number]  Primary Care Physician: Donaldo Sheldon MD    Admit Date: 5/16/2019           Subjective: spoke with hospital care person about c diff test. Will get the result from [facility]. She is not eating and will not take meds sometimes. Will review meds. Failed ng in the past.    Objective:      Physical Exam:    Vitals:  Patient Vitals for the past 24 hrs:   BP Temp Temp src Pulse Resp SpO2   05/23/19 1142 (!) 107/47 97.8 °F (36.6 °C) Axillary 51 18 99 %   05/23/19 0841 120/72 98.2 °F (36.8 °C) Axillary 59 16 95 %   05/23/19 0532 -- -- -- -- -- 96 %   05/23/19 0359 (!) 106/47 98.4 °F (36.9 °C) Axillary 61 16 93 %   05/23/19 0029 (!) 110/50 98.9 °F (37.2 °C) Axillary 56 18 96 %   05/22/19 2048 (!) 107/53 99.2 °F (37.3 °C) Axillary 58 19 97 %   05/22/19 1500 (!) 123/47 99.5 °F (37.5 °C) Axillary 69 18 94 %     Weight: Weight: 215 lb 12.8 oz (97.9 kg)     24 hour intake/output:    Intake/Output Summary (Last 24 hours) at 5/23/2019 1339  Last data filed at 5/23/2019 0404  Gross per 24 hour   Intake 1501.92 ml   Output 475 ml   Net 1026.92 ml       General appearance -awake but drowsy but in no distress  Eyes - pupils equal and reactive, extraocular eye movements intact  Mouth - mucous membranes moist, pharynx normal without lesions  Neck - supple, no significant adenopathy  Chest - clear to auscultation, no wheezes, rales or rhonchi, symmetric air entry  Heart - normal rate, regular rhythm, normal S1, S2, no murmurs, rubs, clicks or gallops  Abdomen - soft, nontender, nondistended, no masses or organomegaly, pos bs. Neurological - awake.   Musculoskeletal - no joint tenderness, deformity or swelling  Extremities - peripheral pulses normal, no pedal edema, no clubbing or cyanosis  Skin - normal coloration and turgor, no rashes, no suspicious skin lesions noted    Review of Labs and Diagnostic Testing:    CBC:   Recent Labs     05/23/19  0735   WBC 5.6   HGB 9.5*   HCT 28.5*   MCV 85.6        BMP:   Recent Labs     05/23/19  0735      K 3.8      CO2 22*   BUN 54*   CREATININE 2.9*   CALCIUM 8.0*   GLUCOSE 99     PT/INR: No results for input(s): PROTIME, INR in the last 72 hours. APTT: No results for input(s): APTT in the last 72 hours. Lipids: No results for input(s): ALKPHOS, ALT, AST, BILITOT, BILIDIR, LABALBU, AMYLASE, LIPASE in the last 72 hours. Troponin: No results for input(s): TROPONINI in the last 72 hours. Imaging:  Us Renal Complete    Result Date: 5/17/2019  PROCEDURE: US RENAL COMPLETE CLINICAL INFORMATION: RENAL FAILURE, ACUTE (KIDNEY INJURY). COMPARISON: Abdomen pelvis CT with contrast dated 9/22/2017 TECHNIQUE:Real-time and color flow ultrasound of the kidneys and bladder were performed. FINDINGS: Kidneys: Left kidney was very poorly visualized. Morphology: Normal echogenicity. Mass/cyst: none visualized Hydronephrosis: There is no right hydronephrosis. There is no definite left hydronephrosis. Calculi: none visualized RIGHT KIDNEY - 10.5 x 4.9 x 4.7 cm Resistive Index - 0.80 Cortical Thickness - 1.2 cm LEFT KIDNEY - 10.1 x 4.2 x 3.7 cm Resistive Index - 0.80 Cortical Thickness - 1.2 cm URINARY BLADDER: No bladder wall thickening or obvious mass. Bilateral urine bladder jets were visualized. There are low level internal echoes in the dependent portion of the bladder which may represent debris Pre-Void - 366 mL Post-Void - pt unable to void     Suboptimal visualization of the left kidney. No hydronephrosis. Elevated renal resistive indices bilaterally. Possible debris layering in the urinary bladder. Patient unable to void. Bladder volume 366 cc. **This report has been created using voice recognition software.  It may contain minor errors which are inherent in voice recognition technology. ** Final report electronically signed by Dr. Shannon Hogue on 5/17/2019 6:16 AM    Xr Chest Portable    Result Date: 5/17/2019  PROCEDURE: XR CHEST PORTABLE CLINICAL INFORMATION: Aspiration. COMPARISON: Chest x-ray dated 9/25/2017 TECHNIQUE: AP Portable chest xray FINDINGS: Lungs/pleura:  No pneumonia, pulmonary edema, or obvious mass. There is minimal mid right lung atelectasis. No pleural effusion. No pneumothorax. Heart: There is mild cardiomegaly. Mediastinum/gely: No obvious mass or adenopathy. Skeleton: No significant bone or joint abnormality. Lines/tubes/devices: none     No acute pneumonia. Minimal mid right lung atelectasis. Mild cardiomegaly. **This report has been created using voice recognition software. It may contain minor errors which are inherent in voice recognition technology. ** Final report electronically signed by Dr. Shannon Hogue on 5/17/2019 12:55 AM    Xr Abdomen For Ng/og/ne Tube Placement    Result Date: 5/17/2019  PROCEDURE: XR ABDOMEN FOR NG/OG/NE TUBE PLACEMENT CLINICAL INFORMATION: Confirmation of course of NG/OG/NE tube and location of tip of tube . Repositioned. COMPARISON: Chest x-ray 5/17/2019. This film was taken at 1305. TECHNIQUE: A portable AP supine view of the abdomen was obtained. FINDINGS: There is an esophageal route tube in place. The tip is in the distal third of the esophagus. The tube is now looped in the esophagus. No distended or dilated bowel loops are noted. The lung bases are clear. 1. Esophageal route tube looped in the esophagus. The tip is in the distal third of the esophagus. The entire tube should be removed. These findings were telephoned to Dena Pyle, the patient's nurse at 2:33 PM on 5/17/2019 . **This report has been created using voice recognition software. It may contain minor errors which are inherent in voice recognition technology. ** Final report electronically signed by Dr. Stormy Clarke on 5/17/2019 2:34 PM    Xr Abdomen For Ng/og/ne Tube Placement    Result Date: 5/17/2019  PROCEDURE: XR ABDOMEN FOR NG/OG/NE TUBE PLACEMENT CLINICAL INFORMATION: Confirmation of course of NG/OG/NE tube and location of tip of tube. COMPARISON: No prior study. TECHNIQUE: A supine AP view of the abdomen was obtained. FINDINGS: Limited study. NG tube is now in the distal third of the esophagus. Recommend advancement at least 6 to 8 cm. NG tube is now in the distal third of the esophagus. Recommend advancement at least 6 to 8 cm. **This report has been created using voice recognition software. It may contain minor errors which are inherent in voice recognition technology. ** Final report electronically signed by Dr. Thom Ledesma on 5/17/2019 1:29 PM    Xr Abdomen For Ng/og/ne Tube Placement    Result Date: 5/17/2019  PROCEDURE: XR ABDOMEN FOR NG/OG/NE TUBE PLACEMENT CLINICAL INFORMATION: NG placement. COMPARISON: No prior study. TECHNIQUE: A supine AP view of the abdomen was obtained. FINDINGS: NG tube is terminates in the distal esophagus. Recommend advancement. Nonspecific bowel gas pattern. Surgical clips in the right upper quadrant. Thoracolumbar scoliosis and degenerative changes. NG tube is terminates in the distal esophagus. Recommend advancement. **This report has been created using voice recognition software. It may contain minor errors which are inherent in voice recognition technology. ** Final report electronically signed by Dr. Thom Ledesma on 5/17/2019 12:38 PM      EKG:      Diet: DIET GENERAL;  Dietary Nutrition Supplements: Clear Liquid Oral Supplement  Dietary Nutrition Supplements: Renal Oral Supplement  Dietary Nutrition Supplements: Frozen Oral Supplement        Data:   Scheduled Meds: Scheduled Meds:   vitamin D  5,000 Units Oral Daily    sodium bicarbonate  650 mg Oral TID    metroNIDAZOLE  250 mg Oral 4 times per day    cefTRIAXone (ROCEPHIN) IV  1 g Intravenous Q24H    risperiDONE  0.125 mg Oral QAM    risperiDONE  0.25 mg Oral Nightly    venlafaxine  37.5 mg Oral BID    insulin regular  5 Units Intravenous Once    dextrose  25 g Intravenous Once    atenolol  25 mg Oral Daily    donepezil  20 mg Oral Daily    sodium chloride flush  10 mL Intravenous 2 times per day    docusate sodium  100 mg Oral BID    heparin (porcine)  5,000 Units Subcutaneous 3 times per day    ipratropium-albuterol  1 ampule Inhalation Q4H WA     Continuous Infusions:   sodium chloride 40 mL/hr at 05/23/19 1308    dextrose       PRN Meds:.midodrine, glucose, dextrose, glucagon (rDNA), dextrose, sodium chloride flush, ondansetron, acetaminophen  Continuous Infusions:   sodium chloride 40 mL/hr at 05/23/19 1308    dextrose           Assessment   Principal Problem:    Acute renal failure (ARF) (HCC)  Active Problems: Moderate malnutrition ([address]  Resolved Problems:    * No resolved hospital problems.  *  morbid obesity      Patient Active Problem List   Diagnosis    Dementia    Hiatus hernia with compression intrathoracic    Chest pain    Spinal stenosis at L4-L5 level  with left leg weakness and urinary retention    Weakness of left lower extremity  spinal stenosis  or cva    Urinary retention  neurogenic bladder    Alzheimer's dementia with behavioral disturbance    Aspiration pneumonia (HCC)  bilateral  from large hiatus hernia    Fever    Esophagitis    Dysmotility of stomach    Metabolic encephalopathy    Acute renal failure (ARF) (HCC)    Moderate malnutrition ([address]        Plan   carmela change flagyl to iv  Palliative care consult      Electronically signed by Jonathan Bazan MD on 5/23/2019 at 1:39 PM

## 2019-05-23 NOTE — CARE COORDINATION
IV Rocephin continues, oral Flagyl resumed. PT/OT following. Creatinine is 2.9 today, IV fluid decreased to 40 ml per hour. Plans return to [facility] [address] [place] [address] at discharge. SW following.   Electronically signed by Phani Calle RN on 5/23/2019 at 1:45 PM

## 2019-05-23 NOTE — PROGRESS NOTES
Renal Progress Note  [facility]    Patient :  Reginia Bernheim; 77 y.o. MRN# [id number]  Location:  [id number]  Attending:  Gerry Glass MD  Admit Date:  5/16/2019   Hospital Day: 7      Subjective:     Nephrology is following the patient for KUMAR, electrolyte abnormalities. Patient seen and examined earlier this morning. Back on Flagyl for C. Diff  No new issues. Nowak cath was removed.  at bedside. Outpatient Medications:     Medications Prior to Admission: albuterol (PROVENTIL) (5 MG/ML) 0.5% nebulizer solution, Take 2.5 mg by nebulization every 6 hours  LORazepam (ATIVAN) 0.5 MG tablet, Take 0.5 mg by mouth 3 times daily as needed for Anxiety. sucralfate (CARAFATE) 1 GM/10ML suspension, Take 1 g by mouth 3 times daily (before meals)  hydrocortisone 1 % lotion, Apply topically 3 times daily as needed (to bilateral arms)  risperiDONE (RISPERDAL) 0.25 MG tablet, Take 0.125 mg by mouth every morning  risperiDONE (RISPERDAL) 0.25 MG tablet, Take 0.25 mg by mouth nightly  Dextromethorphan-guaiFENesin (ROBITUSSIN COUGH+CHEST BOYD DM) 5-100 MG/5ML LIQD, Take 5 mLs by mouth every 4 hours as needed (cough)  venlafaxine (EFFEXOR) 37.5 MG tablet, Take 37.5 mg by mouth 2 times daily  acetaminophen (TYLENOL) 325 MG tablet, Take 650 mg by mouth every 4 hours as needed for Pain   LORazepam (ATIVAN) 0.5 MG tablet, Take 0.5 mg by mouth 2 times daily.    divalproex (DEPAKOTE SPRINKLE) 125 MG capsule, Take 125 mg by mouth 2 times daily  hydrALAZINE (APRESOLINE) 10 MG tablet, Take 10 mg by mouth 3 times daily  losartan-hydrochlorothiazide (HYZAAR) 100-12.5 MG per tablet, Take 1 tablet by mouth daily  omeprazole (PRILOSEC) 40 MG delayed release capsule, Take 40 mg by mouth daily  ranitidine (ZANTAC) 15 MG/ML syrup, Take 150 mg by mouth 2 times daily   atenolol (TENORMIN) 50 MG tablet, Take 1 tablet by mouth daily Hold for systolic blood pressure less than 110, heart rate less than 60  atorvastatin (LIPITOR) 40 MG tablet, Take 40 mg by mouth daily  donepezil (ARICEPT) 10 MG tablet, Take 20 mg by mouth daily   [] metroNIDAZOLE (FLAGYL) 500 MG tablet, Take 500 mg by mouth 3 times daily  [DISCONTINUED] furosemide (LASIX) 20 MG tablet, Take 20 mg by mouth 2 times daily  [DISCONTINUED] potassium chloride (KLOR-CON) 20 MEQ packet, Take 20 mEq by mouth 2 times daily  [DISCONTINUED] ALPRAZolam (XANAX) 0.5 MG tablet, Take 1 tablet by mouth every 8 hours as needed for Anxiety  [DISCONTINUED] ferrous gluconate (FERGON) 240 (27 Fe) MG tablet, Take 1.5 tablets by mouth 3 times daily (with meals)  [DISCONTINUED] pantoprazole (PROTONIX) 40 MG tablet, Take 1 tablet by mouth 2 times daily (before meals)  [DISCONTINUED] loperamide (IMODIUM) 2 MG capsule, Take 2 mg by mouth nightly. Current Medications:     Scheduled Meds:    sodium bicarbonate  650 mg Oral TID    metroNIDAZOLE  250 mg Oral 4 times per day    cefTRIAXone (ROCEPHIN) IV  1 g Intravenous Q24H    risperiDONE  0.125 mg Oral QAM    risperiDONE  0.25 mg Oral Nightly    venlafaxine  37.5 mg Oral BID    insulin regular  5 Units Intravenous Once    dextrose  25 g Intravenous Once    atenolol  25 mg Oral Daily    donepezil  20 mg Oral Daily    sodium chloride flush  10 mL Intravenous 2 times per day    docusate sodium  100 mg Oral BID    heparin (porcine)  5,000 Units Subcutaneous 3 times per day    ipratropium-albuterol  1 ampule Inhalation Q4H WA     Continuous Infusions:    sodium chloride 60 mL/hr at 19 1932    dextrose       PRN Meds:  midodrine, glucose, dextrose, glucagon (rDNA), dextrose, sodium chloride flush, ondansetron, acetaminophen    Input/Output:       I/O last 3 completed shifts: In: 1601.9 [P.O.:100; I.V.:1501.9]  Out: 475 [Urine:475].       Patient Vitals for the past 96 hrs (Last 3 readings):   Weight   19 0327 215 lb 12.8 oz (97.9 kg)       Vital Signs:   Temperature:  Temp: 98.2 °F (36.8 °C)  TMax:   Temp (24hrs), Av.8 °F (37.1 °C), Min:98.2 °F (36.8 °C), Max:99.5 °F (37.5 °C)    Respirations:  Resp: 16  Pulse:   Pulse: 59  BP:    BP: 120/72  BP Range: Systolic (64ZPE), YRS:741 , Min:106 , XQB:677       Diastolic (19UNR), HPH:11, Min:47, Max:72      Physical Examination:     General:  Awake, no acute distress, demented  HEENT: NC/AT/ MMM  Chest:               Clear anteriorly  Cardiac:  S1, S2  Abdomen: Soft, non-tender,  Neuro:  No facial droop, No Asterixis  SKIN:  No rashes, good skin turgor. Extremities:  1+ LE edema    Labs:       Recent Labs     19  0803 19  0735   WBC 8.3 5.6   RBC 3.40* 3.33*   HGB 9.3* 9.5*   HCT 28.5* 28.5*   MCV 83.8 85.6   MCH 27.4 28.5   MCHC 32.6 33.3    227   MPV 10.2 10.4      BMP:   Recent Labs     19  0719 19  2307 19  0819  0735     --  138 140   K 3.5 3.9 4.2 3.8     --  105 108   CO2 23  --  19* 22*   BUN 76*  --  64* 54*   CREATININE 3.5*  --  3.0* 2.9*   GLUCOSE 83  --  87 99   CALCIUM 8.0*  --  7.7* 8.0*      Phosphorus:   No results for input(s): PHOS in the last 72 hours. Magnesium:    Recent Labs     19  0803 19  0735   MG 2.0 2.0     Albumin:  No results for input(s): LABALBU in the last 72 hours.   BNP:    No results found for: BNP  DIEGO:    No results found for: DIEGO  SPEP:  Lab Results   Component Value Date    PROT 5.9 2017     UPEP:   No results found for: LABPE  C3:   No results found for: C3  C4:   No results found for: C4  MPO ANCA:   No results found for: MPO  PR3 ANCA:   No results found for: PR3  Anti-GBM:   No results found for: GBMABIGG  Hep BsAg:       No results found for: HEPBSAG  Hep C AB:        No results found for: HEPCAB    Urinalysis/Chemistries:      Lab Results   Component Value Date    NITRU POSITIVE 2019    COLORU DARK YELLOW 2019    PHUR 5.0 2019    LABCAST 4-8 HYALINE 2017    LABCAST NONE SEEN 2017    WBCUA > 200 05/16/2019    RBCUA 25-50 05/16/2019    MUCUS NONE SEEN 05/16/2019    YEAST NONE SEEN 05/16/2019    BACTERIA NONE 05/16/2019    SPECGRAV 1.028 09/22/2017    LEUKOCYTESUR LARGE 05/16/2019    UROBILINOGEN 0.2 05/16/2019    BILIRUBINUR SMALL 05/16/2019    BLOODU SMALL 05/16/2019    GLUCOSEU NEGATIVE 05/16/2019    KETUA TRACE 05/16/2019    AMORPHOUS DEBRIS 05/16/2019     Urine Sodium:   No results found for: ABIGAIL  Urine Potassium:  No results found for: KUR  Urine Chloride:  No results found for: CLUR  Urine Osmolarity:   Lab Results   Component Value Date    OSMOU 376 05/17/2019     Urine Protein:   No components found for: TOTALPROTEIN, URINE   Urine Creatinine:     Lab Results   Component Value Date    LABCREA 98.3 05/17/2019     Urine Eosinophils:  No components found for: UEOS        Impression and Plan:  1. KUMAR due to severe volume depletion + meds - overall improved, creat 2.9 today - decrease IV fluids to 40 cc/hour due to edema  2. Metabolic acidosis -improving  3. Hypernatremia -resolved  4. Hypocalcemia - await Vit D  5. Metabolic encephalopathy from uremia - resolved  6. New onset Afib,started on Atenolol  7. C. Diff  - Flagyl  8. E. Coli UTI - Rocephin  9. Dementia            Please don't hesitate to call with any questions.   Electronically signed by Adebayo Wilson DO on 5/23/2019 at 11:17 AM

## 2019-05-23 NOTE — PLAN OF CARE
Problem: Impaired respiratory status  Goal: Clear lung sounds  Outcome: Ongoing   Continue with breathing treatments to achieve clear lung sounds.

## 2019-05-23 NOTE — PLAN OF CARE
Problem: Falls - Risk of:  Goal: Will remain free from falls  Description  Will remain free from falls  Outcome: Ongoing     Discussed use of alarms and hourly rounding with patient. Belongings within reach including call light. Turning every 2 hours with pillow support. Family in room. Problem: Risk for Impaired Skin Integrity  Goal: Tissue integrity - skin and mucous membranes  Description  Structural intactness and normal physiological function of skin and  mucous membranes. 5/22/2019 0117 by Ambrose Alford RN  Outcome: Ongoing   Turning every 2 hours prn. Buttock and abad area assessed with turns due to increased BM, cream applied as needed. Pillow support for heels  Problem: Discharge Planning:  Goal: Discharged to appropriate level of care  Description  Discharged to appropriate level of care  5/22/2019 0117 by Ambrose Alford RN  Outcome: Ongoing   Probable return to [place] [facility]. Continue to assess discharge needs as they arise. Problem: Pain:  Goal: Pain level will decrease  Description  Pain level will decrease  5/22/2019 0117 by Ambrose Alford RN  Outcome: Ongoing   Denies pain this shift. Appears to rest comfortably with pillow support. Problem: Bowel/Gastric:  Goal: Occurrences of diarrhea will decrease  Description  Occurrences of diarrhea will decrease  5/22/2019 0117 by Ambrose Alford RN  Outcome: Ongoing   BM monitored, antibiotics as ordered. Problem: Tissue Perfusion - Renal, Altered:  Goal: Electrolytes within specified parameters  Description  Electrolytes within specified parameters  Outcome: Ongoing   Monitored with ordered testing, replacement of electrolytes per order. Problem: Tissue Perfusion - Renal, Altered:  Goal: Serum creatinine will be within specified parameters  Description  Serum creatinine will be within specified parameters  Outcome: Ongoing   Nephrology following patient. IV hydration.    Problem: Tissue Perfusion - Renal, Altered:  Goal: Ability to achieve a balanced intake and output will improve  Description  Ability to achieve a balanced intake and output will improve  Outcome: Ongoing   I&O monitored closely. Nowak inserted due to retention. Problem: Nutrition  Goal: Optimal nutrition therapy  Outcome: Ongoing   Encouraged food patient likes. Family brings some food from home. Care plan reviewed with patient and . Patient and  verbalize understanding of the plan of care and contribute to goal setting.

## 2019-05-24 VITALS
HEIGHT: 61 IN | DIASTOLIC BLOOD PRESSURE: 64 MMHG | BODY MASS INDEX: 42.01 KG/M2 | TEMPERATURE: 98.9 F | WEIGHT: 222.5 LBS | OXYGEN SATURATION: 94 % | HEART RATE: 70 BPM | RESPIRATION RATE: 20 BRPM | SYSTOLIC BLOOD PRESSURE: 124 MMHG

## 2019-05-24 LAB
ANION GAP SERPL CALCULATED.3IONS-SCNC: 13 MEQ/L (ref 8–16)
BUN BLDV-MCNC: 44 MG/DL (ref 7–22)
CALCIUM SERPL-MCNC: 8.2 MG/DL (ref 8.5–10.5)
CHLORIDE BLD-SCNC: 111 MEQ/L (ref 98–111)
CO2: 21 MEQ/L (ref 23–33)
CREAT SERPL-MCNC: 2.5 MG/DL (ref 0.4–1.2)
GFR SERPL CREATININE-BSD FRML MDRD: 19 ML/MIN/1.73M2
GLUCOSE BLD-MCNC: 92 MG/DL (ref 70–108)
POTASSIUM SERPL-SCNC: 3.4 MEQ/L (ref 3.5–5.2)
SODIUM BLD-SCNC: 145 MEQ/L (ref 135–145)

## 2019-05-24 PROCEDURE — 6360000002 HC RX W HCPCS: Performed by: INTERNAL MEDICINE

## 2019-05-24 PROCEDURE — 6360000002 HC RX W HCPCS: Performed by: NURSE PRACTITIONER

## 2019-05-24 PROCEDURE — 2700000000 HC OXYGEN THERAPY PER DAY

## 2019-05-24 PROCEDURE — 99232 SBSQ HOSP IP/OBS MODERATE 35: CPT | Performed by: NURSE PRACTITIONER

## 2019-05-24 PROCEDURE — 2580000003 HC RX 258: Performed by: INTERNAL MEDICINE

## 2019-05-24 PROCEDURE — 80048 BASIC METABOLIC PNL TOTAL CA: CPT

## 2019-05-24 PROCEDURE — 36415 COLL VENOUS BLD VENIPUNCTURE: CPT

## 2019-05-24 PROCEDURE — 6370000000 HC RX 637 (ALT 250 FOR IP): Performed by: INTERNAL MEDICINE

## 2019-05-24 PROCEDURE — 94640 AIRWAY INHALATION TREATMENT: CPT

## 2019-05-24 PROCEDURE — 2500000003 HC RX 250 WO HCPCS: Performed by: INTERNAL MEDICINE

## 2019-05-24 RX ORDER — IPRATROPIUM BROMIDE AND ALBUTEROL SULFATE 2.5; .5 MG/3ML; MG/3ML
3 SOLUTION RESPIRATORY (INHALATION)
Qty: 360 ML | DISCHARGE
Start: 2019-05-24

## 2019-05-24 RX ORDER — SODIUM BICARBONATE 650 MG/1
650 TABLET ORAL 3 TIMES DAILY
DISCHARGE
Start: 2019-05-24

## 2019-05-24 RX ORDER — ATENOLOL 25 MG/1
25 TABLET ORAL DAILY
Qty: 30 TABLET | Refills: 3 | DISCHARGE
Start: 2019-05-25

## 2019-05-24 RX ORDER — POTASSIUM CHLORIDE 7.45 MG/ML
10 INJECTION INTRAVENOUS
Status: COMPLETED | OUTPATIENT
Start: 2019-05-24 | End: 2019-05-24

## 2019-05-24 RX ORDER — MIDODRINE HYDROCHLORIDE 5 MG/1
5 TABLET ORAL EVERY 8 HOURS PRN
Qty: 90 TABLET | Refills: 3 | DISCHARGE
Start: 2019-05-24

## 2019-05-24 RX ORDER — CIPROFLOXACIN 500 MG/1
250 TABLET, FILM COATED ORAL 2 TIMES DAILY
Refills: 0 | DISCHARGE
Start: 2019-05-24 | End: 2019-06-03

## 2019-05-24 RX ORDER — METRONIDAZOLE 500 MG/1
500 TABLET ORAL 3 TIMES DAILY
Qty: 30 TABLET | Refills: 0 | DISCHARGE
Start: 2019-05-24 | End: 2019-06-03

## 2019-05-24 RX ADMIN — Medication 10 ML: at 10:43

## 2019-05-24 RX ADMIN — POTASSIUM CHLORIDE 10 MEQ: 7.46 INJECTION, SOLUTION INTRAVENOUS at 10:42

## 2019-05-24 RX ADMIN — IPRATROPIUM BROMIDE AND ALBUTEROL SULFATE 1 AMPULE: .5; 3 SOLUTION RESPIRATORY (INHALATION) at 16:23

## 2019-05-24 RX ADMIN — METRONIDAZOLE 500 MG: 500 INJECTION, SOLUTION INTRAVENOUS at 07:27

## 2019-05-24 RX ADMIN — POTASSIUM CHLORIDE 10 MEQ: 7.46 INJECTION, SOLUTION INTRAVENOUS at 11:53

## 2019-05-24 RX ADMIN — HEPARIN SODIUM 5000 UNITS: 5000 INJECTION INTRAVENOUS; SUBCUTANEOUS at 05:32

## 2019-05-24 RX ADMIN — IPRATROPIUM BROMIDE AND ALBUTEROL SULFATE 1 AMPULE: .5; 3 SOLUTION RESPIRATORY (INHALATION) at 09:03

## 2019-05-24 RX ADMIN — SODIUM BICARBONATE 650 MG: 650 TABLET ORAL at 15:25

## 2019-05-24 ASSESSMENT — PAIN SCALES - WONG BAKER: WONGBAKER_NUMERICALRESPONSE: 0

## 2019-05-24 NOTE — PLAN OF CARE
- 16.0 meq/L 10.0   Est, Glom Filt Rate Latest Units: ml/min/1.73m2 16 (A)   Magnesium Latest Ref Range: 1.6 - 2.4 mg/dL 2.0   Glucose Latest Ref Range: 70 - 108 mg/dL 99   Calcium Latest Ref Range: 8.5 - 10.5 mg/dL 8.0 (L)     Nephrology following. Problem: Physical Regulation:  Goal: Prevent transmision of infection  Description  Prevent transmision of infection  Outcome: Ongoing  Note:   Pt remains in contact plus isolation. Care plan reviewed with patient. Patient verbalizes understanding of the plan of care and contribute to goal setting.

## 2019-05-24 NOTE — CARE COORDINATION
5/24/19, 3:28 PM    Discharge plan discussed by  and . Discharge plan reviewed with patient/ family. Patient/ family verbalize understanding of discharge plan and are in agreement with plan. Understanding was demonstrated using the teach back method. Services After Discharge  Services At/After Discharge: Nursing Services([address])   IMM Letter  IMM Letter given to Patient/Family/Significant other/Guardian/POA/by[de-identified]   IMM Letter date given[de-identified] 05/24/19  IMM Letter time given[de-identified] [phone]     Patient is an anticipated discharge today returning to the [address] [place] [address] under her medicaid LOC. SW updated Gita regarding oral antibiotics at discharge and she would update the [facility]el regarding the plan. Patient and spouse were seen by Palliative Care and will be followed at the facility.

## 2019-05-24 NOTE — CARE COORDINATION
5/24/19, 9:53 AM    DISCHARGE BARRIERS    SW spoke with Patient's spouse and discussed discharge plan and that discharge was expected with in the next 1-2 days pending on the physician's review. Spouse indicated that they do not want her discharged over the weekend and the reason why is in regards to their care at the nursing home. SW explained that the nursing home is aware of her needs and that they are prepared to accept Patient's over the weekend. Spouse asked that SW speak with his daughter Kavita Mckenzie in regards to her discharge as well. SW then spoke with Kavita Mckenzie by phone and explained that discharge is expected in the next 1-2 days and that the nursing home has been following her mother's care while she has been here. Suzanne stated that she wanted SW to know that it is SW's responsibility to ensure that the nursing home has everything that they need for her mother and if they don't then [facility] needs to send those things including all of her medications. SW explained that was not something that happened and if the nursing home was not able to supply her mother with something that she does need then they would explain that to GLENN and then it would be addressed. Suzanne was very threatening in her phone conversation and stated that it is one thing that goes wrong and it was GLENN's career on the line and that it only takes one phone call and one email to administration. GLENN did tell Kavita Mckenzie that we are only trying to work towards discharge at this time. GLENN spoke with [facility] ([place] [facility]), they are only able to administer Q24 hour iv's at this time. GLENN updated RN.

## 2019-05-24 NOTE — CARE COORDINATION
5/24/19, 11:01 AM    DISCHARGE BARRIERS    GLENN spoke with Daughter Germaine again in regards to Patient discharging. Suzanne stated that she spoke with [facility] at [address] [place] [address] and they have no RN's to coverage over the weekend for IV's. GLENN explained to the daughter that at this time the discharge plan was unclear regarding the IV and if she would truly discharge today. GLENN informed Suzanne that GLENN has been speaking with [facility] and that they do have an RN for the daytime shift to administer an IV. Suzanne stated that her mom has been on IV for the past day and doesn't understand why she wouldn't go on the IV. GLENN then spoke with [facility] at [facility] and she stated that she did again speak with Suzanne and clarify nursing coverage for the weekend and that they can do an IV once a day. GLENN and [facility] decided to have one  so that the information is not being confused and passed thru multiple people. GLENN informed [facility] that GLENN would contact her once discharge orders are obtained and finalized.      RN updated

## 2019-05-24 NOTE — CONSULTS
[address] [place] [address]                                  CONSULTATION    PATIENT NAME: Aixa Monique                      :        1940  MED REC NO:   [id number]                           ROOM:       [id number]  ACCOUNT NO:   [de-identified]                           ADMIT DATE: 2019  PROVIDER:     Polly Denton M.D. CLINICAL COURSE          MICHELLE Bateman M.D.    D: 2019 13:57:58       T: 2019 15:06:14     [email or web address]  Job#: [id number]     Doc#: [id number]    CC:

## 2019-05-24 NOTE — PROGRESS NOTES
normocephalic, Extraocular movement intact. Neck is supple. Voice is clear. CARDIOVASCULAR:  S1, S2  regular rate and rhythm. RESPIRATORY: Clear to ausculation bilaterally. Equal breath sounds. No wheezes. No shortness of breath noted at rest.  ABDOMEN: soft, non tender  NEUROLOGICAL: Patient is alert and oriented to person. Recent and remote memory is not intact. SKIN: no rash, No significant bruises on exposed surfaces  MUSCULOSKELETAL: Movement is coordinated. Moves all extremities   EXTREMITIES: Distal lower extremity temp is warm, 1+ lower extremity edema. PSYCHIATRIC: mood and affect appropriate. Medications:   Med reviewed  Scheduled Meds:   vitamin D  5,000 Units Oral Daily    metroNIDAZOLE  500 mg Intravenous Q8H    sodium bicarbonate  650 mg Oral TID    cefTRIAXone (ROCEPHIN) IV  1 g Intravenous Q24H    risperiDONE  0.125 mg Oral QAM    risperiDONE  0.25 mg Oral Nightly    venlafaxine  37.5 mg Oral BID    insulin regular  5 Units Intravenous Once    dextrose  25 g Intravenous Once    atenolol  25 mg Oral Daily    donepezil  20 mg Oral Daily    sodium chloride flush  10 mL Intravenous 2 times per day    docusate sodium  100 mg Oral BID    heparin (porcine)  5,000 Units Subcutaneous 3 times per day    ipratropium-albuterol  1 ampule Inhalation Q4H WA     Labs:   Labs reviewed  Recent Labs     05/22/19  0803 05/23/19  0735 05/23/19  0817 05/24/19  0750    140  --  145   K 4.2 3.8  --  3.4*    108  --  111   CO2 19* 22*  --  21*   BUN 64* 54*  --  44*   CREATININE 3.0* 2.9*  --  2.5*   LABGLOM 15* 16*  --  19*   GLUCOSE 87 99  --  92   MG 2.0 2.0  --   --    CALCIUM 7.7* 8.0*  --  8.2*   CAION  --   --  0.95*  --      Recent Labs     05/22/19  0803 05/23/19  0735   WBC 8.3 5.6   RBC 3.40* 3.33*   HGB 9.3* 9.5*   HCT 28.5* 28.5*   MCV 83.8 85.6   MCH 27.4 28.5    227      Ref.  Range 5/23/2019 07:35   Vit D, 25-Hydroxy Latest Ref Range: 30 - 100 ng/ml 19 (L) ASSESSMENT:  1. Acute Kidney Injury 2nd to renal hypoperfusion from hypotension and volume contraction. Creatinine improving, even though pt is not eating and drinking little. Consider Starting NS at 50/hr. 2. Metabolic acidosis 2nd to KUMAR, hypotension  3. Hypocalcemia, Vit D 19 on PO supplement  4. Hypokalemia, replace with 20 meq IV rider. 5. New onset Atrial fib  6. C diff, IV flagyl  7. Urinary tract infection with E coli  8. Normocytic, normochromic anemia, likely 2nd to chronic disease, Iron stores ok. 9. Dementia     BMP in AM  Principal Problem:    Acute renal failure (ARF) (HCC)  Active Problems: Moderate malnutrition ([address]  Resolved Problems:    * No resolved hospital problems.  JOHN Navarrete - CNP 8:29 AM 5/24/2019

## 2019-05-24 NOTE — PROGRESS NOTES
Nutrition Assessment    Type and Reason for Visit: Reassess    Nutrition Recommendations:   Continue current diet, further diet recommendations per SLP. Continue ONS. Recommend consider a renal multivitamin. Nutrition Assessment:   Pt. with no improvement from a nutritional standpoint AEB oral intake remains very minimal, 0-50% at best, taking some ONS. Remains at risk for further nutritional compromise r/t waxing/waning alertness, oral intake 50% or less of meals, recent c-diff and UTI, and need for continued nutrition support. Will continue to provide ONS. Assist with meals as needed. Further diet recommendations per SLP. Malnutrition Assessment:  · Malnutrition Status: Meets the criteria for moderate malnutrition  · Context: Acute illness or injury  · Findings of the 6 clinical characteristics of malnutrition (Minimum of 2 out of 6 clinical characteristics is required to make the diagnosis of moderate or severe Protein Calorie Malnutrition based on AND/ASPEN Guidelines):  1. Energy Intake-Less than or equal to 50% of estimated energy requirement, Greater than or equal to 5 days    2. Fat Loss-Mild subcutaneous fat loss, Orbital  3. Muscle Loss-Mild muscle mass loss, Temples (temporalis muscle)    Nutrition Risk Level: High    Nutrient Needs:  · Estimated Daily Total Kcal: ~ 9066-3814 kcals (15-18 kcals/kg/day based on 97 kg)  · Estimated Daily Protein (g): 38-48 grams (0.8-1 gram/kg/day based on 48 kg IBW) increase as renal status allows    Nutrition Diagnosis:   · Problem: Moderate malnutrition, In context of acute illness or injury  · Etiology: related to Insufficient energy/nutrient consumption, Diarrhea, Cognitive or neurological impairment     Signs and symptoms:  as evidenced by Diet history of poor intake, Mild muscle loss, Mild loss of subcutaneous fat    Objective Information:  · Nutrition-Focused Physical Findings: Recent c-diff, 4 bowel movements in the last 24 hours. Ecoli-UT. History of dementia, resides at [facility], waxing and waning alertness continues. Pt seen, humming, unable to answer questions. No family present. Potassium 3.4, BUN 44, Creatinine 2.5. Medications: flagyl, vitamin D, sodium bicarbonate, rocephin, zofran, aricept. · Wound Type: (groin MASD)  · Current Nutrition Therapies:  · Oral Diet Orders: General   · Oral Diet intake: 0%, 1-25%, 26-50%  · Oral Nutrition Supplement (ONS) Orders: Clear Liquid Oral Supplement, Renal Oral Supplement, Frozen Oral Supplement(Ensure Clear BID, Nepro daily, Magic Cup TID)  · ONS intake: 26-50%  · Anthropometric Measures:  · Ht: 5' 1\" (154.9 cm)   · Current Body Wt: 222 lb 8 oz (100.9 kg)(5/24/19 +2 left upper extremity, trace LE edema)  · Admission Body Wt: 212 lb 14.4 oz (96.6 kg)(5/17/19 trace LE edema)  · Usual Body Wt: (~ 219# per daughter. Per ECF records: 3/19/19: 211#, 3/28/19: 215.8#, 4/5/19: 219. 2#)  · % Weight Change:  ,  2.9% loss in 1 month  · Ideal Body Wt: 105 lb (47.6 kg),   · BMI Classification: BMI > or equal to 40.0 Obese Class III    Nutrition Interventions:   Continue current diet, Continue current ONS  Continued Inpatient Monitoring, Education not appropriate at this time, Coordination of Care    Nutrition Evaluation:   · Evaluation: No progress toward goals   · Goals: Pt will safely consume 75% or more of meals during LOS.     · Monitoring: Meal Intake, Supplement Intake, Diet Tolerance, Skin Integrity, Mental Status/Confusion, Weight, Pertinent Labs, Chewing/Swallowing, Diarrhea      Electronically signed by Scottie Hedrick RD, LD on 5/24/19 at 12:00 PM    Contact Number: [phone]

## 2019-05-24 NOTE — CARE COORDINATION
Creatinine is 2.5 today, IV fluids stopped. Flagyl changed to IV from po. Palliative Care to see. BMP in AM. Plan is to return to [facility] at discharge.   Electronically signed by Yovani Drew RN on 5/24/2019 at 11:57 AM

## 2019-05-24 NOTE — PROGRESS NOTES
1000: Pt sleeping at this time of visit and is not awakened. Updated on pt status and plan of care by Janine Perez and Tomas Hagan. [facility] and review pt status and medical issues of concern.  shares that pt has had dementia for several years and he cared for her at home initially and then she was at [facility] as her level of need increased.  states that pt gets up to chair by \" lift and sling. \" He also states until recent events she has been eating well and he is able to communicate with her. We review medical issues of concern including poor appetite, pt refusing medication doses at times, C-Diff, changes that occur with advancing dementia, etc. We also discuss goals of care for pt and options for treatment.  states that dialysis was discussed when pt first admitted but that he does not feel that would be \"right thing\" for pt, Ramon Perkins would have to leave the nursing home 3 times a week and that would be hard on her. \" We also discuss risks and benefits of a feeding tube related to advancing dementia patients. He voices concern that she \"pulls at things\" and I don't know that we would want to do that either. After much discussion about pt status and family hardship (pt and spouse have lost 2 daughters tragically),  states that his daughter is on her way from [place] and will return when she is here to further discuss goals. Cyril Knutson RN and JENNA ELIZABETH updated.

## 2019-05-24 NOTE — DISCHARGE SUMMARY
Dr. Kirk Cummings Discharge Summary  5/24/2019  1:44 PM    Patient:  Estefanía Crawford  YOB: 1940    MRN: [id number]   Acct: [id number]   [facility]/097-[id number]   Primary Care Physician: Juan Ramon Jackson MD    Admit date:  5/16/2019    Discharge date:  5/24/2019    Discharge Diagnoses:    Patient Active Problem List   Diagnosis    Dementia    Hiatus hernia with compression intrathoracic    Chest pain    Spinal stenosis at L4-L5 level  with left leg weakness and urinary retention    Weakness of left lower extremity  spinal stenosis  or cva    Urinary retention  neurogenic bladder    Alzheimer's dementia with behavioral disturbance    Aspiration pneumonia (HCC)  bilateral  from large hiatus hernia    Fever    Esophagitis    Dysmotility of stomach    Metabolic encephalopathy    Acute renal failure (ARF) ([address]    Moderate malnutrition ([address]       Discharge Medications:       Anyi Nelson   [facility] Medication Instructions AXU:[id number]    Printed on:05/24/19 9041   Medication Information                      acetaminophen (TYLENOL) 325 MG tablet  Take 650 mg by mouth every 4 hours as needed for Pain              albuterol (PROVENTIL) (5 MG/ML) 0.5% nebulizer solution  Take 2.5 mg by nebulization every 6 hours             atenolol (TENORMIN) 25 MG tablet  Take 1 tablet by mouth daily             ciprofloxacin (CIPRO) 500 MG tablet  Take 0.5 tablets by mouth 2 times daily for 10 days             Dextromethorphan-guaiFENesin (ROBITUSSIN COUGH+CHEST BOYD DM) 5-100 MG/5ML LIQD  Take 5 mLs by mouth every 4 hours as needed (cough)             divalproex (DEPAKOTE SPRINKLE) 125 MG capsule  Take 125 mg by mouth 2 times daily             donepezil (ARICEPT) 10 MG tablet  Take 20 mg by mouth daily              ipratropium-albuterol (DUONEB) 0.5-2.5 (3) MG/3ML SOLN nebulizer solution  Inhale 3 mLs into the lungs every 4 hours (while awake)             metroNIDAZOLE (FLAGYL) 500 MG tablet  Take 1 tablet by mouth 3 times daily for 10 days             midodrine (PROAMATINE) 5 MG tablet  Take 1 tablet by mouth every 8 hours as needed (SBP <100, DBP < 55)             omeprazole (PRILOSEC) 40 MG delayed release capsule  Take 40 mg by mouth daily             risperiDONE (RISPERDAL) 0.25 MG tablet  Take 0.125 mg by mouth every morning             risperiDONE (RISPERDAL) 0.25 MG tablet  Take 0.25 mg by mouth nightly             sodium bicarbonate 650 MG tablet  Take 1 tablet by mouth 3 times daily             venlafaxine (EFFEXOR) 37.5 MG tablet  Take 37.5 mg by mouth 2 times daily             vitamin D (CHOLECALCIFEROL) 1000 UNIT TABS tablet  Take 5 tablets by mouth daily               Scheduled Meds: Scheduled Meds:   vitamin D  5,000 Units Oral Daily    metroNIDAZOLE  500 mg Intravenous Q8H    sodium bicarbonate  650 mg Oral TID    cefTRIAXone (ROCEPHIN) IV  1 g Intravenous Q24H    risperiDONE  0.125 mg Oral QAM    risperiDONE  0.25 mg Oral Nightly    venlafaxine  37.5 mg Oral BID    insulin regular  5 Units Intravenous Once    dextrose  25 g Intravenous Once    atenolol  25 mg Oral Daily    donepezil  20 mg Oral Daily    sodium chloride flush  10 mL Intravenous 2 times per day    docusate sodium  100 mg Oral BID    heparin (porcine)  5,000 Units Subcutaneous 3 times per day    ipratropium-albuterol  1 ampule Inhalation Q4H WA     Continuous Infusions:   dextrose       PRN Meds:.midodrine, glucose, dextrose, glucagon (rDNA), dextrose, sodium chloride flush, ondansetron, acetaminophen  Continuous Infusions:   dextrose         Intake/Output Summary (Last 24 hours) at 5/24/2019 1344  Last data filed at 5/24/2019 0342  Gross per 24 hour   Intake 0 ml   Output --   Net 0 ml       Diet:  DIET GENERAL;  Dietary Nutrition Supplements: Clear Liquid Oral Supplement  Dietary Nutrition Supplements: Renal Oral Supplement  Dietary Nutrition Supplements: Frozen Oral Supplement    Activity:  Activity as tolerated (Patient may move about with assist as indicated or with supervision.)    Follow-up:  in the next few weeks with Elizabeth Hopkins MD,       Consultants:  nephrology    Procedures:      Diagnostic Test:    Objective:  Lab Results   Component Value Date    WBC 5.6 05/23/2019    RBC 3.33 05/23/2019    HGB 9.5 05/23/2019    HCT 28.5 05/23/2019    MCV 85.6 05/23/2019    MCH 28.5 05/23/2019    MCHC 33.3 05/23/2019    RDW 16.6 10/04/2017     05/23/2019    MPV 10.4 05/23/2019     Lab Results   Component Value Date     05/24/2019    K 3.4 05/24/2019    K 3.6 05/20/2019     05/24/2019    CO2 21 05/24/2019    BUN 44 05/24/2019    CREATININE 2.5 05/24/2019    GLUCOSE 92 05/24/2019    CALCIUM 8.2 05/24/2019     Lab Results   Component Value Date    CALCIUM 8.2 05/24/2019     No results found for: IONCA  Lab Results   Component Value Date    MG 2.0 05/23/2019     Lab Results   Component Value Date    PHOS 8.2 05/17/2019     No results found for: BNP  Lab Results   Component Value Date    ALKPHOS 91 09/27/2017    ALT 13 09/27/2017    AST 21 09/27/2017    PROT 5.9 09/27/2017    BILITOT 0.2 09/27/2017    BILIDIR <0.2 09/27/2017    LABALBU 2.0 05/17/2019     Lab Results   Component Value Date    LACTA 1.0 09/22/2017     No results found for: AMYLASE  Lab Results   Component Value Date    LIPASE 55.9 09/22/2017     No results found for: CHOL, TRIG, HDL, LDLCALC  No results for input(s): POCGLU in the last 72 hours. No results for input(s): CKTOTAL, CKMB, TROPONINI in the last 72 hours. No results found for: LABA1C  Lab Results   Component Value Date    INR 1.32 05/17/2019     No results found for: PHART, PO2ART, CWG1RFB, QSV0OGD, [facility] Course: ,The patient is a 65-year-old who I started seeing on behalf of my  colleague Dr. Humera Carney. The patient was admitted to the hospital with a  clinical impression of urinary tract infection and also chronic kidney  disease. The patient did continue to have diarrhea.   She did an  incomplete treatment of Flagyl when she was at the [facility] on arrival and  this was somehow discontinued after a day of therapy. The patient was  reevaluated and resumed back on the Flagyl. Her symptoms did improve  though deconditioned. The patient also was seen by the nephrologist due  to chronic kidney disease for which she was carefully management with  partial potassium replacement protocol for the hypokalemia probably  secondary to the chronic diarrhea. Her symptoms continues to improve;  however, she will need further treatment to complete the antibiotic  treatment, hence will be discharged into the ECF today for the C. diff  and also for urinary tract infection on the antibiotics listed including  Flagyl and Cipro. The patient's  is at the bedside and he  indicated to me that she will take pills if done in the way of crushing  it and putting in the ice cream.  He has been very successful with that  and he willing to continue doing that at the [facility]. We did change the  medication back to oral on discharged. Otherwise no other events. Vitals: /64   Pulse 70   Temp 98.9 °F (37.2 °C) (Axillary)   Resp 20   Ht 5' 1\" (1.549 m)   Wt 222 lb 8 oz (100.9 kg)   SpO2 94%   BMI 42.04 kg/m²   Physical Exam:  General appearance -awake and responsive. Eyes - pupils equal and reactive, extraocular eye movements intact  Mouth - mucous membranes moist, pharynx normal without lesions  Neck - supple, no significant adenopathy  Chest - clear to auscultation, no wheezes, rales or rhonchi, symmetric air entry  Heart - normal rate, regular rhythm, normal S1, S2, no murmurs, rubs, clicks or gallops  Abdomen - soft, nontender, nondistended, no masses or organomegaly, pos bs. Neurological - awake. at baseline  Musculoskeletal - no joint tenderness, deformity or swelling  Extremities - peripheral pulses normal, no pedal edema, no clubbing or cyanosis  Skin - normal coloration and turgor, no rashes, no suspicious skin lesions noted      Disposition: SNF    Condition: Stable        Alfred Nowak MD     Copy: Primary Care Physician: Nan Tyson MD  Internal Medicine  Over 30 mins spent.

## 2019-05-28 ENCOUNTER — TELEPHONE (OUTPATIENT)
Dept: FAMILY MEDICINE CLINIC | Age: 79
End: 2019-05-28

## 2019-12-20 PROBLEM — R06.02 SOB (SHORTNESS OF BREATH): Status: ACTIVE | Noted: 2019-01-01

## 2022-01-18 NOTE — PROGRESS NOTES
RN paged Dr Teodora Daley at daughter's request. Message as follows: Daughter requesting to see you. Unsatisfied with BP management and nausea with emesis x2 today. Waiting for reply. When Should The Patient Follow-Up For Their Next Full-Body Skin Exam?: 6 Months Quality 137: Melanoma: Continuity Of Care - Recall System: Patient information entered into a recall system that includes: target date for the next exam specified AND a process to follow up with patients regarding missed or unscheduled appointments Detail Level: Simple Detail Level: Zone Sunscreen Recommendations: At home photo documentation will help tracking for any changes.

## 2024-12-03 NOTE — PROGRESS NOTES
Hospitalist Progress Note    Patient:  [facility]      Unit/Bed:8B-23/023-A    YOB: 1940    MRN: [id number]       Acct: [de-identified]     PCP: Kenny Byers DO    Date of Admission: 9/22/2017    Chief Complaint: BLE weakness, urinary retention, poor appetite  [facility] Course: admitted with above She has history or dementia. She has been getting progressively more agitated and non compliant at home. CTA showed possibility of aspiration pneumonia with elevated WBCs. She was febrile initially but no fever after admission and procal normal X 3. She was started on ATB which were stopped 9/25. Had mild KUMAR due to dehydration. Fall on 9/24 in [place] with no injuries. Psych consulted and resumed all home psych medications. Urinary retention treated with russell catheter which has been removed. Patient is voiding. Had urinary retention again 9/27 with  ml and russell was replaced. Planning on SNF at discharge and SS assisting with plans. Precert has been initiated. 9/29-patient had black stools with +FOBS. GI consulted and planning EGD this am.    Subjective: In bed with family present. She reports pain to right wrist IV site but no other pain or discomfort. Discussed with family current labs and EGD today. Questions answered. They expressed some concerns about patient's care while here. Assured them they would be addressed with the charge nurse, which they were.        Medications:  Reviewed    Infusion Medications    Scheduled Medications    ferrous gluconate  360 mg Oral TID WC    atenolol  100 mg Oral Daily    sodium chloride (PF)  10 mL Intravenous 2 times per day    sucralfate  1 g Oral 4 times per day    lidocaine PF  5 mL Intradermal Once    sodium chloride flush  10 mL Intravenous 2 times per day    glycopyrrolate  1 mg Oral BID    potassium replacement protocol   Other RX Placeholder    influenza virus vaccine  0.5 mL Intramuscular Once    donepezil  20 mg Oral Daily    NN received call from patient. Patient states that she has established care with Dr. Rocio Santillan (pul) at [facility]. She has done PFT's already, she is having an echo, labs and CT done tomorrow because she does feel like she has been SOB more often. NN inquired if patient would be transitioning her PH care to Dr. Santillan but she stated that she would like to continue to see STANLEY Feliz as well. NN explained the importance of follow ups and being available. Her last OV was 5/2023 and although some visits can be done virtually and most testing can be done locally, there will be times where patient will have to come in person. Patient verbalized understanding. Patient will have a virtual appointment with STANLEY Feliz next week and NN will request testing after tomorrow.   ARIPiprazole  2 mg Oral BID    venlafaxine  75 mg Oral BID    loperamide  2 mg Oral Nightly    aspirin  81 mg Oral Nightly    pantoprazole  40 mg Oral QAM AC     PRN Meds: sodium chloride (PF), hydrALAZINE, ondansetron, hydrALAZINE, sodium chloride flush, bisacodyl, ALPRAZolam, ipratropium-albuterol, acetaminophen      Intake/Output Summary (Last 24 hours) at 09/30/17 0804  Last data filed at 09/30/17 0546   Gross per 24 hour   Intake             1510 ml   Output              475 ml   Net             1035 ml       Diet:  Diet NPO, After Midnight    Exam:  BP (!) 164/73  Pulse 62  Temp 98.6 °F (37 °C) (Oral)   Resp 16  Ht 5' 1\" (1.549 m)  Wt 196 lb 4.8 oz (89 kg)  SpO2 94%  BMI 37.09 kg/m2    General appearance: No apparent distress, appears stated age and cooperative. HEENT: Pupils equal, round. Conjunctivae/corneas clear. Respiratory:  Normal respiratory effort. Clear to auscultation, bilaterally without Rales/Wheezes/Rhonchi. Cardiovascular: Regular rate and rhythm with normal S1/S2 without murmurs, rubs or gallops. Abdomen: Soft, non-tender, non-distended with normal bowel sounds. Musculoskeletal: No clubbing, cyanosis or edema bilaterally. Full range of motion without deformity. Skin: Skin color, texture, turgor normal.  No rashes or lesions. Neurologic:  Neurovascularly intact without any focal sensory/motor deficits.  Cranial nerves: II-XII intact, grossly non-focal.  Psychiatric: Alert and oriented to self, \"Lima\"; cooperative  Capillary Refill: Brisk,< 3 seconds   Peripheral Pulses: +1 palpable, equal bilaterally       Labs:   Recent Labs      09/28/17   0457  09/29/17   0510  09/29/17   1226   WBC  7.3  8.0   --    HGB  11.8*  9.6*  9.8*   HCT  34.3*  28.5*  29.2*   PLT  275  326   --      Recent Labs      09/27/17   1526  09/28/17   0457  09/29/17   0510   NA  136  141  137   K  4.3  4.0  4.1   CL  103  102  100   CO2  19*  23  25   BUN  18  17  22   CREATININE  0.9  0.8  0.9   CALCIUM technology. **      Final report electronically signed by Dr. Bran on 9/25/2017 11:03 AM      XR Chest Portable   Final Result   1. No acute cardiopulmonary process. **This report has been created using voice recognition software. It may contain minor errors which are inherent in voice recognition technology. **      Final report electronically signed by Dr. Miranda Bowens on 9/25/2017 6:36 AM      CT LUMBAR RECONSTRUCTION WO POST PROCESS   Final Result   1. No acute fractures or acute subluxations. 2.  Discogenic and hypertrophic osteoarthritic/degenerative changes as described level by level mostly affecting the lower lumbar spine. These have a chronic appearance. **This report has been created using voice recognition software. It may contain minor errors which are inherent in voice recognition technology. **      Final report electronically signed by Dr. Bran on 9/22/2017 10:18 PM      CT THORACIC RECONSTRUCTION WO POST PROCESS   Final Result   1. No acute fractures or acute subluxations. 2.  Minimal degenerative changes. 3.  No obvious discogenic acute processes or disc bulges. CT is less sensitive than MRI for disc pathology. **This report has been created using voice recognition software. It may contain minor errors which are inherent in voice recognition technology. **      Final report electronically signed by Dr. Bran on 9/22/2017 10:15 PM      CT ABDOMEN PELVIS W IV CONTRAST Additional Contrast? None   Final Result   1. Very large fluid-filled hiatal hernia. The intra-abdominal portion of the stomach is distended with a large gas fluid level. 2.  No bowel obstruction. 3.  Increase in the size of the left ovarian cyst. In the postmenopausal patient systems this side is almost certainly benign but should be followed annually with ultrasound. 4.  Stable small likely simple cyst in the left kidney inferiorly.    5.  Moderate L4-L5 level  with left leg weakness and urinary retention [M48.06] 09/22/2017     Priority: High     Class: Acute    Weakness of left lower extremity  spinal stenosis  or cva [R29.898] 09/22/2017     Priority: High     Class: Acute    Urinary retention  neurogenic bladder [R33.9] 09/22/2017     Priority: High     Class: Chronic    Alzheimer's dementia with behavioral disturbance [G30.8, F02.81] 09/22/2017     Priority: High     Class: Acute    Aspiration pneumonia ([address]  bilateral  from large hiatus hernia [J69.0] 09/22/2017     Priority: High     Class: Acute    Fever [R50.9]     Dehydration [E86.0]      1. Chest pain--resolved; ACS ruled out with neg trops and EKG; CTA neg for PE or other thoracic pathology; questionable aspiration pna  2. Possible aspiration pneumonia (POA)--resolved; probably not present; ground glass appearance bilat lung fields per CTA; WBC elevated 9/22; procal has been negative and afebrile; Levaquin and azithromycin in ER; Zosyn 9/23-24 and stopped on 9/25; no SOB, cough, sputum  3. Generalized weakness--related to progressing dementia and poor appetite; cont PT/OT  4. Acute blood loss anemia--> 2 gm drop 9/28-9/29; recheck was stable; up to 10.6 today; black stools; stop Lovenox and add SCDs for DVT proph; GI consult; EGD this am-follow  5. Nausea and vomiting--has been intermittent over last 2 days, occurring with eating and meds; nursing reports emesis has been brown-no coffee ground material; cont prn Zofran-IM; patient w/o IV access at this time due to pulling out; GI consult; related to #8? 6. Leukocytosis--resolved  7. Urinary retention--russell had been removed but replaced on 9/27 due to  ml;  reports she would go up to 39 hours without voiding at home but would go a large amount when she would finally void; will need to be discharged with russell and f/u with urology 1 week after discharge  8.  Large hiatal hernia--per CT; cont PPI;  has been educated on

## (undated) DEVICE — DEFENDO AIR WATER SUCTION AND BIOPSY VALVE KIT FOR  OLYMPUS: Brand: DEFENDO AIR/WATER/SUCTION AND BIOPSY VALVE

## (undated) DEVICE — FORCEP RAD JAW W/NEEDLE 160CM

## (undated) DEVICE — CONMED SCOPE SAVER BITE BLOCK, 20X27 MM: Brand: SCOPE SAVER

## (undated) DEVICE — ENDO KIT: Brand: MEDLINE INDUSTRIES, INC.